# Patient Record
Sex: FEMALE | Race: WHITE | Employment: OTHER | URBAN - METROPOLITAN AREA
[De-identification: names, ages, dates, MRNs, and addresses within clinical notes are randomized per-mention and may not be internally consistent; named-entity substitution may affect disease eponyms.]

---

## 2017-10-14 ENCOUNTER — APPOINTMENT (OUTPATIENT)
Dept: CT IMAGING | Age: 62
DRG: 069 | End: 2017-10-14
Attending: EMERGENCY MEDICINE
Payer: SELF-PAY

## 2017-10-14 ENCOUNTER — APPOINTMENT (OUTPATIENT)
Dept: MRI IMAGING | Age: 62
DRG: 069 | End: 2017-10-14
Attending: EMERGENCY MEDICINE
Payer: SELF-PAY

## 2017-10-14 ENCOUNTER — APPOINTMENT (OUTPATIENT)
Dept: GENERAL RADIOLOGY | Age: 62
DRG: 069 | End: 2017-10-14
Attending: EMERGENCY MEDICINE
Payer: SELF-PAY

## 2017-10-14 ENCOUNTER — HOSPITAL ENCOUNTER (INPATIENT)
Age: 62
LOS: 4 days | Discharge: HOME | DRG: 069 | End: 2017-10-18
Attending: EMERGENCY MEDICINE | Admitting: HOSPITALIST
Payer: SELF-PAY

## 2017-10-14 DIAGNOSIS — R42 VERTIGO: Primary | ICD-10-CM

## 2017-10-14 DIAGNOSIS — Z71.6 TOBACCO ABUSE COUNSELING: ICD-10-CM

## 2017-10-14 DIAGNOSIS — R47.81 SLURRED SPEECH: ICD-10-CM

## 2017-10-14 DIAGNOSIS — D75.1 POLYCYTHEMIA: ICD-10-CM

## 2017-10-14 DIAGNOSIS — G45.9 TRANSIENT CEREBRAL ISCHEMIA, UNSPECIFIED TYPE: ICD-10-CM

## 2017-10-14 LAB
ALBUMIN SERPL-MCNC: 3.8 G/DL (ref 3.5–5)
ALBUMIN/GLOB SERPL: 1 {RATIO} (ref 1.1–2.2)
ALP SERPL-CCNC: 112 U/L (ref 45–117)
ALT SERPL-CCNC: 19 U/L (ref 12–78)
ANION GAP SERPL CALC-SCNC: 11 MMOL/L (ref 5–15)
AST SERPL-CCNC: 17 U/L (ref 15–37)
BASOPHILS # BLD: 0 K/UL (ref 0–0.1)
BASOPHILS NFR BLD: 0 % (ref 0–1)
BILIRUB SERPL-MCNC: 0.9 MG/DL (ref 0.2–1)
BUN SERPL-MCNC: 16 MG/DL (ref 6–20)
BUN/CREAT SERPL: 18 (ref 12–20)
CALCIUM SERPL-MCNC: 9.7 MG/DL (ref 8.5–10.1)
CHLORIDE SERPL-SCNC: 106 MMOL/L (ref 97–108)
CO2 SERPL-SCNC: 22 MMOL/L (ref 21–32)
CREAT SERPL-MCNC: 0.9 MG/DL (ref 0.55–1.02)
EOSINOPHIL # BLD: 0.1 K/UL (ref 0–0.4)
EOSINOPHIL NFR BLD: 1 % (ref 0–7)
ERYTHROCYTE [DISTWIDTH] IN BLOOD BY AUTOMATED COUNT: 12.9 % (ref 11.5–14.5)
ETHANOL SERPL-MCNC: <10 MG/DL
GLOBULIN SER CALC-MCNC: 4 G/DL (ref 2–4)
GLUCOSE BLD STRIP.AUTO-MCNC: 141 MG/DL (ref 65–100)
GLUCOSE SERPL-MCNC: 110 MG/DL (ref 65–100)
HCT VFR BLD AUTO: 54.8 % (ref 35–47)
HGB BLD-MCNC: 19.5 G/DL (ref 11.5–16)
INR BLD: 1.3 (ref 0.9–1.2)
LYMPHOCYTES # BLD: 1.5 K/UL (ref 0.8–3.5)
LYMPHOCYTES NFR BLD: 20 % (ref 12–49)
MCH RBC QN AUTO: 34.5 PG (ref 26–34)
MCHC RBC AUTO-ENTMCNC: 35.6 G/DL (ref 30–36.5)
MCV RBC AUTO: 96.8 FL (ref 80–99)
MONOCYTES # BLD: 0.6 K/UL (ref 0–1)
MONOCYTES NFR BLD: 8 % (ref 5–13)
NEUTS SEG # BLD: 5.1 K/UL (ref 1.8–8)
NEUTS SEG NFR BLD: 71 % (ref 32–75)
PLATELET # BLD AUTO: 226 K/UL (ref 150–400)
POTASSIUM SERPL-SCNC: 3.6 MMOL/L (ref 3.5–5.1)
PROT SERPL-MCNC: 7.8 G/DL (ref 6.4–8.2)
RBC # BLD AUTO: 5.66 M/UL (ref 3.8–5.2)
SERVICE CMNT-IMP: ABNORMAL
SODIUM SERPL-SCNC: 139 MMOL/L (ref 136–145)
TROPONIN I SERPL-MCNC: <0.04 NG/ML
WBC # BLD AUTO: 7.2 K/UL (ref 3.6–11)

## 2017-10-14 PROCEDURE — 80053 COMPREHEN METABOLIC PANEL: CPT | Performed by: EMERGENCY MEDICINE

## 2017-10-14 PROCEDURE — 93005 ELECTROCARDIOGRAM TRACING: CPT

## 2017-10-14 PROCEDURE — 70547 MR ANGIOGRAPHY NECK W/O DYE: CPT

## 2017-10-14 PROCEDURE — 96374 THER/PROPH/DIAG INJ IV PUSH: CPT

## 2017-10-14 PROCEDURE — 70544 MR ANGIOGRAPHY HEAD W/O DYE: CPT

## 2017-10-14 PROCEDURE — 96376 TX/PRO/DX INJ SAME DRUG ADON: CPT

## 2017-10-14 PROCEDURE — 74011250636 HC RX REV CODE- 250/636: Performed by: INTERNAL MEDICINE

## 2017-10-14 PROCEDURE — 74011000250 HC RX REV CODE- 250: Performed by: INTERNAL MEDICINE

## 2017-10-14 PROCEDURE — 85025 COMPLETE CBC W/AUTO DIFF WBC: CPT | Performed by: EMERGENCY MEDICINE

## 2017-10-14 PROCEDURE — 74011250636 HC RX REV CODE- 250/636: Performed by: EMERGENCY MEDICINE

## 2017-10-14 PROCEDURE — 96361 HYDRATE IV INFUSION ADD-ON: CPT

## 2017-10-14 PROCEDURE — 80307 DRUG TEST PRSMV CHEM ANLYZR: CPT | Performed by: EMERGENCY MEDICINE

## 2017-10-14 PROCEDURE — 82962 GLUCOSE BLOOD TEST: CPT

## 2017-10-14 PROCEDURE — 65660000000 HC RM CCU STEPDOWN

## 2017-10-14 PROCEDURE — 99285 EMERGENCY DEPT VISIT HI MDM: CPT

## 2017-10-14 PROCEDURE — 77030029684 HC NEB SM VOL KT MONA -A

## 2017-10-14 PROCEDURE — 84484 ASSAY OF TROPONIN QUANT: CPT | Performed by: EMERGENCY MEDICINE

## 2017-10-14 PROCEDURE — 36415 COLL VENOUS BLD VENIPUNCTURE: CPT | Performed by: EMERGENCY MEDICINE

## 2017-10-14 PROCEDURE — 74011250637 HC RX REV CODE- 250/637: Performed by: INTERNAL MEDICINE

## 2017-10-14 PROCEDURE — 70551 MRI BRAIN STEM W/O DYE: CPT

## 2017-10-14 PROCEDURE — 71010 XR CHEST PORT: CPT

## 2017-10-14 PROCEDURE — 94640 AIRWAY INHALATION TREATMENT: CPT

## 2017-10-14 PROCEDURE — 70450 CT HEAD/BRAIN W/O DYE: CPT

## 2017-10-14 PROCEDURE — 85610 PROTHROMBIN TIME: CPT

## 2017-10-14 RX ORDER — SODIUM CHLORIDE 9 MG/ML
50 INJECTION, SOLUTION INTRAVENOUS ONCE
Status: DISPENSED | OUTPATIENT
Start: 2017-10-14 | End: 2017-10-15

## 2017-10-14 RX ORDER — SODIUM CHLORIDE 0.9 % (FLUSH) 0.9 %
5 SYRINGE (ML) INJECTION AS NEEDED
Status: DISCONTINUED | OUTPATIENT
Start: 2017-10-14 | End: 2017-10-18 | Stop reason: HOSPADM

## 2017-10-14 RX ORDER — ONDANSETRON 2 MG/ML
4 INJECTION INTRAMUSCULAR; INTRAVENOUS
Status: COMPLETED | OUTPATIENT
Start: 2017-10-14 | End: 2017-10-14

## 2017-10-14 RX ORDER — CALCIUM CARBONATE 200(500)MG
200 TABLET,CHEWABLE ORAL
Status: DISCONTINUED | OUTPATIENT
Start: 2017-10-14 | End: 2017-10-18 | Stop reason: HOSPADM

## 2017-10-14 RX ORDER — CLINDAMYCIN HYDROCHLORIDE 150 MG/1
300 CAPSULE ORAL 3 TIMES DAILY
Status: COMPLETED | OUTPATIENT
Start: 2017-10-14 | End: 2017-10-16

## 2017-10-14 RX ORDER — LABETALOL HYDROCHLORIDE 5 MG/ML
10 INJECTION, SOLUTION INTRAVENOUS
Status: DISCONTINUED | OUTPATIENT
Start: 2017-10-14 | End: 2017-10-14

## 2017-10-14 RX ORDER — SODIUM CHLORIDE 0.9 % (FLUSH) 0.9 %
5-10 SYRINGE (ML) INJECTION EVERY 8 HOURS
Status: DISCONTINUED | OUTPATIENT
Start: 2017-10-14 | End: 2017-10-18 | Stop reason: HOSPADM

## 2017-10-14 RX ORDER — SODIUM CHLORIDE 9 MG/ML
100 INJECTION, SOLUTION INTRAVENOUS CONTINUOUS
Status: DISCONTINUED | OUTPATIENT
Start: 2017-10-14 | End: 2017-10-18 | Stop reason: HOSPADM

## 2017-10-14 RX ORDER — CLINDAMYCIN HYDROCHLORIDE 300 MG/1
300 CAPSULE ORAL 3 TIMES DAILY
COMMUNITY
End: 2017-10-18

## 2017-10-14 RX ORDER — ERGOCALCIFEROL 1.25 MG/1
50000 CAPSULE ORAL
COMMUNITY
End: 2021-05-26

## 2017-10-14 RX ORDER — IPRATROPIUM BROMIDE AND ALBUTEROL SULFATE 2.5; .5 MG/3ML; MG/3ML
3 SOLUTION RESPIRATORY (INHALATION)
Status: DISCONTINUED | OUTPATIENT
Start: 2017-10-14 | End: 2017-10-18 | Stop reason: HOSPADM

## 2017-10-14 RX ORDER — NAPROXEN 500 MG/1
500 TABLET ORAL 2 TIMES DAILY WITH MEALS
COMMUNITY
End: 2017-10-18

## 2017-10-14 RX ORDER — SODIUM CHLORIDE 0.9 % (FLUSH) 0.9 %
5-10 SYRINGE (ML) INJECTION AS NEEDED
Status: DISCONTINUED | OUTPATIENT
Start: 2017-10-14 | End: 2017-10-18 | Stop reason: HOSPADM

## 2017-10-14 RX ORDER — ENOXAPARIN SODIUM 100 MG/ML
40 INJECTION SUBCUTANEOUS EVERY 24 HOURS
Status: DISCONTINUED | OUTPATIENT
Start: 2017-10-14 | End: 2017-10-18 | Stop reason: HOSPADM

## 2017-10-14 RX ORDER — SODIUM CHLORIDE 0.9 % (FLUSH) 0.9 %
5 SYRINGE (ML) INJECTION EVERY 8 HOURS
Status: DISCONTINUED | OUTPATIENT
Start: 2017-10-14 | End: 2017-10-14

## 2017-10-14 RX ORDER — ALPRAZOLAM 0.5 MG/1
0.25 TABLET ORAL
Status: DISCONTINUED | OUTPATIENT
Start: 2017-10-14 | End: 2017-10-18 | Stop reason: HOSPADM

## 2017-10-14 RX ORDER — GUAIFENESIN 100 MG/5ML
81 LIQUID (ML) ORAL DAILY
Status: DISCONTINUED | OUTPATIENT
Start: 2017-10-14 | End: 2017-10-18 | Stop reason: HOSPADM

## 2017-10-14 RX ADMIN — SODIUM CHLORIDE 100 ML/HR: 900 INJECTION, SOLUTION INTRAVENOUS at 20:24

## 2017-10-14 RX ADMIN — CLINDAMYCIN HYDROCHLORIDE 300 MG: 150 CAPSULE ORAL at 19:31

## 2017-10-14 RX ADMIN — ASPIRIN 81 MG 81 MG: 81 TABLET ORAL at 19:31

## 2017-10-14 RX ADMIN — ENOXAPARIN SODIUM 40 MG: 40 INJECTION SUBCUTANEOUS at 19:30

## 2017-10-14 RX ADMIN — Medication 10 ML: at 22:00

## 2017-10-14 RX ADMIN — CALCIUM CARBONATE (ANTACID) CHEW TAB 500 MG 200 MG: 500 CHEW TAB at 19:57

## 2017-10-14 RX ADMIN — ALPRAZOLAM 0.25 MG: 0.5 TABLET ORAL at 19:56

## 2017-10-14 RX ADMIN — Medication 10 ML: at 19:27

## 2017-10-14 RX ADMIN — SODIUM CHLORIDE 1000 ML: 900 INJECTION, SOLUTION INTRAVENOUS at 14:13

## 2017-10-14 RX ADMIN — CLINDAMYCIN HYDROCHLORIDE 300 MG: 150 CAPSULE ORAL at 22:27

## 2017-10-14 RX ADMIN — ONDANSETRON 4 MG: 2 INJECTION INTRAMUSCULAR; INTRAVENOUS at 14:13

## 2017-10-14 RX ADMIN — FAMOTIDINE 20 MG: 10 INJECTION, SOLUTION INTRAVENOUS at 19:25

## 2017-10-14 RX ADMIN — IPRATROPIUM BROMIDE AND ALBUTEROL SULFATE 3 ML: .5; 3 SOLUTION RESPIRATORY (INHALATION) at 22:49

## 2017-10-14 RX ADMIN — ONDANSETRON 4 MG: 2 INJECTION INTRAMUSCULAR; INTRAVENOUS at 16:11

## 2017-10-14 NOTE — IP AVS SNAPSHOT
[address] [address] 
[phone] Patient: Pratibha Richardson MRN: [id number] AQS:7/03/7203 Current Discharge Medication List  
  
START taking these medications Dose & Instructions Dispensing Information Comments Morning Noon Evening Bedtime  
 aspirin 81 mg chewable tablet Start taking on:  10/19/2017 Your last dose was: Your next dose is:    
   
   
 Dose:  81 mg Take 1 Tab by mouth daily. Quantity:  30 Tab Refills:  0  
     
   
   
   
  
 atorvastatin 20 mg tablet Commonly known as:  LIPITOR Your last dose was: Your next dose is:    
   
   
 Dose:  20 mg Take 1 Tab by mouth nightly. Quantity:  30 Tab Refills:  0  
     
   
   
   
  
 calcium carbonate 200 mg calcium (500 mg) Chew Commonly known as:  TUMS Your last dose was: Your next dose is:    
   
   
 Dose:  200 mg Take 1 Tab by mouth four (4) times daily as needed. Quantity:  30 Tab Refills:  0 CONTINUE these medications which have NOT CHANGED Dose & Instructions Dispensing Information Comments Morning Noon Evening Bedtime COMBIVENT RESPIMAT  mcg/actuation inhaler Generic drug:  ipratropium-albuterol Your last dose was: Your next dose is:    
   
   
 Dose:  1 Puff Take 1 Puff by inhalation every six (6) hours. Refills:  0  
     
   
   
   
  
 VITAMIN D2 50,000 unit capsule Generic drug:  ergocalciferol Your last dose was: Your next dose is:    
   
   
 Dose:  76982 Units Take 50,000 Units by mouth. Refills:  0 STOP taking these medications   
 clindamycin 300 mg capsule Commonly known as:  CLEOCIN  
   
  
 naproxen 500 mg tablet Commonly known as:  NAPROSYN Where to Get Your Medications Information on where to get these meds will be given to you by the nurse or doctor. ! Ask your nurse or doctor about these medications  
  aspirin 81 mg chewable tablet  
 atorvastatin 20 mg tablet  
 calcium carbonate 200 mg calcium (500 mg) Chew

## 2017-10-14 NOTE — ED NOTES
Patient attempted to use bedpan in MRI, unable to void. Speech improved to normal, no slurring.   Refusing IV contrast, continuing test without

## 2017-10-14 NOTE — IP AVS SNAPSHOT
[address] 
[phone] Patient: Mika Iyer MRN: [id number] BGS:7/64/2367 You are allergic to the following Allergen Reactions Darvocet A500 (Propoxyphene N-Acetaminophen) Shortness of Breath Iodine Hives Sulfa (Sulfonamide Antibiotics) Other (comments) Recent Documentation Height Weight BMI OB Status Smoking Status 1.549 m 58.1 kg 24.2 kg/m2 Hysterectomy Current Every Day Smoker Unresulted Labs Order Current Status SAMPLE TO BLOOD BANK In process [facility]. LAB TEST Preliminary result Emergency Contacts Name Discharge Info Relation Home Work Mobile Hima Au  Child [2] [phone] About your hospitalization You were admitted on:  October 14, 2017 You last received care in the:  [facility] 6S NEURO-SCI TELE You were discharged on:  October 18, 2017 Unit phone number:  [phone] Why you were hospitalized Your primary diagnosis was:  Tia (Transient Ischemic Attack) Providers Seen During Your Hospitalizations Provider Role Specialty Primary office phone Maria Esther Mcdonnell MD Attending Provider Emergency Medicine [phone] Catherine Ware MD Attending Provider Internal Medicine [phone] Raciel Cornejo MD Attending Provider Internal Medicine [phone] Nakul Calvo MD Attending Provider Internal Medicine [phone] Your Primary Care Physician (PCP) Primary Care Physician Office Phone Office Fax Deepak Vasiliy [phone] Follow-up Information Follow up With Details Comments Contact Info Karla Cordoba MD Go on 10/30/2017 Appt 10/30/17 at 1pm. Please arrive about 15 minutes prior to scheduled appointment. [address] [place] [address] 
[phone]  Yaz Velazquez MD Schedule an appointment as soon as possible for a visit in 1 week Hospital follow up [address] [facility] [address] 
[phone] Your Appointments Monday October 30, 2017  1:00 PM EDT New Patient with MD Dilan [facility] at [address] [facility] [address] [place] [address]) 301 [facility], [address] [facility]  
[phone] Current Discharge Medication List  
  
START taking these medications Dose & Instructions Dispensing Information Comments Morning Noon Evening Bedtime  
 aspirin 81 mg chewable tablet Start taking on:  10/19/2017 Your last dose was: Your next dose is:    
   
   
 Dose:  81 mg Take 1 Tab by mouth daily. Quantity:  30 Tab Refills:  0  
     
   
   
   
  
 atorvastatin 20 mg tablet Commonly known as:  LIPITOR Your last dose was: Your next dose is:    
   
   
 Dose:  20 mg Take 1 Tab by mouth nightly. Quantity:  30 Tab Refills:  0  
     
   
   
   
  
 calcium carbonate 200 mg calcium (500 mg) Chew Commonly known as:  TUMS Your last dose was: Your next dose is:    
   
   
 Dose:  200 mg Take 1 Tab by mouth four (4) times daily as needed. Quantity:  30 Tab Refills:  0 CONTINUE these medications which have NOT CHANGED Dose & Instructions Dispensing Information Comments Morning Noon Evening Bedtime COMBIVENT RESPIMAT  mcg/actuation inhaler Generic drug:  ipratropium-albuterol Your last dose was: Your next dose is:    
   
   
 Dose:  1 Puff Take 1 Puff by inhalation every six (6) hours. Refills:  0  
     
   
   
   
  
 VITAMIN D2 50,000 unit capsule Generic drug:  ergocalciferol Your last dose was: Your next dose is:    
   
   
 Dose:  06793 Units Take 50,000 Units by mouth. Refills:  0 STOP taking these medications clindamycin 300 mg capsule Commonly known as:  CLEOCIN  
   
  
 naproxen 500 mg tablet Commonly known as:  NAPROSYN Where to Get Your Medications Information on where to get these meds will be given to you by the nurse or doctor. ! Ask your nurse or doctor about these medications  
  aspirin 81 mg chewable tablet  
 atorvastatin 20 mg tablet  
 calcium carbonate 200 mg calcium (500 mg) Chew Discharge Instructions Discharge Instructions PATIENT ID: Aramis Crabtree MRN: [id number] YOB: 1955 DATE OF ADMISSION: 10/14/2017  1:12 PM   
DATE OF DISCHARGE: 10/18/2017 PRIMARY CARE PROVIDER: Christy Bustos MD  
 
ATTENDING PHYSICIAN: Frankey Console, MD 
DISCHARGING PROVIDER: Frankey Console, MD   
To contact this individual call [phone] and ask the  to page. If unavailable ask to be transferred the Adult Hospitalist Department. DISCHARGE DIAGNOSES: TIA ? Polycythemia CONSULTATIONS: IP CONSULT TO HOSPITALIST 
IP CONSULT TO NEUROLOGY 
IP CONSULT TO HEMATOLOGY 
IP CONSULT TO PSYCHIATRY PROCEDURES/SURGERIES: * No surgery found * PENDING TEST RESULTS:  
At the time of discharge the following test results are still pending: None FOLLOW UP APPOINTMENTS:  
Follow-up Information Follow up With Details Comments Contact Info Doc MD Ernestina Go on 10/30/2017 Appt 10/30/17 at 1pm. Please arrive about 15 minutes prior to scheduled appointment. [address] [place] [address] [address] [place] [address] 
[phone] Christy Bustos MD   [address] [place] [address] [address] [place] [address] 
[phone] ADDITIONAL CARE RECOMMENDATIONS: Strongly advised to quit smoking DIET: Cardiac Diet ACTIVITY: Activity as tolerated DISCHARGE MEDICATIONS: 
 See Medication Reconciliation Form · It is important that you take the medication exactly as they are prescribed. · Keep your medication in the bottles provided by the pharmacist and keep a list of the medication names, dosages, and times to be taken in your wallet. · Do not take other medications without consulting your doctor. NOTIFY YOUR PHYSICIAN FOR ANY OF THE FOLLOWING:  
Fever over 101 degrees for 24 hours. Chest pain, shortness of breath, fever, chills, nausea, vomiting, diarrhea, change in mentation, falling, weakness, bleeding. Severe pain or pain not relieved by medications. Or, any other signs or symptoms that you may have questions about. DISPOSITION: 
X  Home With: 
 OT  PT  [place]  RN  
  
 SNF/Inpatient Rehab/LTAC Independent/assisted living Hospice Other: CDMP Checked: Yes X PROBLEM LIST Updated: Yes X Signed:  
Lucita Cummings MD 
10/18/2017 
1:59 PM 
 
Discharge Orders None Introducing \[facility]\"" & [facility] SERVICES! Jeff Martin introduces [employer] patient portal. Now you can access parts of your medical record, email your doctor's office, and request medication refills online. 1. In your internet browser, go to [email or web address] 2. Click on the First Time User? Click Here link in the Sign In box. You will see the New Member Sign Up page. 3. Enter your [employer] Access Code exactly as it appears below. You will not need to use this code after youve completed the sign-up process. If you do not sign up before the expiration date, you must request a new code. · [employer] Access Code: [email or web address] Expires: 1/14/2018  2:42 PM 
 
4. Enter the last four digits of your Social Security Number (xxxx) and Date of Birth (mm/dd/yyyy) as indicated and click Submit. You will be taken to the next sign-up page. 5. Create a [employer] ID. This will be your [employer] login ID and cannot be changed, so think of one that is secure and easy to remember. 6. Create a [employer] password. You can change your password at any time. 7. Enter your Password Reset Question and Answer. This can be used at a later time if you forget your password. 8. Enter your e-mail address. You will receive e-mail notification when new information is available in [address] 9. Click Sign Up. You can now view and download portions of your medical record. 10. Click the Download Summary menu link to download a portable copy of your medical information. If you have questions, please visit the Frequently Asked Questions section of the [employer] website. Remember, [employer] is NOT to be used for urgent needs. For medical emergencies, dial 911. Now available from your iPhone and Android! General Information Please provide this summary of care documentation to your next provider. Patient Signature:  ____________________________________________________________ Date:  ____________________________________________________________  
  
Sheyla Huber Provider Signature:  ____________________________________________________________ Date:  ____________________________________________________________

## 2017-10-14 NOTE — PROGRESS NOTES
Admission Medication Reconciliation:    Information obtained from: Sons and daughter-in-law: Abiodun Wilder and Celso Booker Hoff Stout    Significant PMH/Disease States:   Past Medical History:   Diagnosis Date    Cancer [facility])     cervical cancer    Cancer ([facility] [address]     skin cancer    Ill-defined condition     lyme disease       Chief Complaint for this Admission:  Dizziness    Allergies:  Darvocet a500 [propoxyphene n-acetaminophen]; Iodine; and Sulfa (sulfonamide antibiotics)    Prior to Admission Medications:   Prior to Admission Medications   Prescriptions Last Dose Informant Patient Reported? Taking? clindamycin (CLEOCIN) 300 mg capsule 10/14/2017 at am  Yes Yes   Sig: Take 300 mg by mouth three (3) times daily. Facility-Administered Medications: None         Comments/Recommendations: Unable to interview patient (PARTH @ MRI), but her family was available. All information is from children only, will follow up when patient returns. The following items were brought to the attention of Dr. Jane Centeno:  1. Clindamycin is thought to be treating rectal or anal abscesses or other SSTI, prescription filled 10/2/2017 and was intended as 10 day course, took a dose today (son described that patient had lower portion of colon removed and had been having issues for some time)  2. Patient is heavy smoker and drinker, recommended CIWA and nicotine patch for patient  3. Children describe blood disorder (\"too much blood\")    Thank you for allowing me to participate in the care of your patient.     Reid Landaverde PharmD, RN #[id number]

## 2017-10-14 NOTE — ED NOTES
Updated Dr Sujatha Nieves via cell phone regarding negative reading, updated Dr Dorothy Hebert via phone. Patient remains responsive, no change to assessment.

## 2017-10-14 NOTE — PROGRESS NOTES
Intravenous Proton Pump Inhibitor (Pantoprazole) Shortage Update    The IV pantoprazole for Jannet Ware has been converted to IV famotidine in accordance with the [place]-Approved IV PPI Shortage Plan. IV pantoprazole will be reserved only for patients with clinically or endoscopically documented upper GI bleeding, confirmed diagnosis of H. pylori or acute erosive esophagitis in patients who are NPO. Please call the main inpatient pharmacy at [phone] with any questions.       Jerson Landeros, PharmD

## 2017-10-14 NOTE — ED TRIAGE NOTES
Pt presents from home, at approximately 10:30am pt became dizzy, had uncontrollable shaking all over, and numb hands. Pt states that at 1230 she began to have abnormal speech.  Blood sugar 181 in squad

## 2017-10-14 NOTE — ED PROVIDER NOTES
HPI Comments: 58yo F with pmh sig for lyme disease, cervical ca who p/w shakiness and slurred speech. Symptoms started around 10:30 AM today. Pt got a shower to help with symptoms but shakiness was worse afterwards. Pt also noted slurred speech around that time. Pt states never had symptoms like this before. Has h/o R sided facial weakness as a result of lyme disease but no history of speech difficulty. Denies any extremity weakness/numbness. Denies chest pain, headache, n/v, c/d. Pt adds that she has a disorder with overproduction of blood and has been placed on bedrest by her PCP. Patient is a 60 y.o. female presenting with dizziness. The history is provided by the patient and a relative. Dizziness   Primary symptoms include speech difficulty. Pertinent negatives include no chest pain. Past Medical History:   Diagnosis Date    Cancer [facility])     cervical cancer    Cancer ([facility] [address]     skin cancer    Ill-defined condition     lyme disease       Past Surgical History:   Procedure Laterality Date    HX GI      isaias colectomy    HX GYN      partial hysterectomy    HX GYN      c section    HX ORTHOPAEDIC      RIGHT shoulder surgery    HX ORTHOPAEDIC      RIGHT foot surgery    NEUROLOGICAL PROCEDURE UNLISTED      lumber surgery         History reviewed. No pertinent family history. Social History     Social History    Marital status: N/A     Spouse name: N/A    Number of children: N/A    Years of education: N/A     Occupational History    Not on file. Social History Main Topics    Smoking status: Current Every Day Smoker     Packs/day: 1.00    Smokeless tobacco: Never Used    Alcohol use 6.0 oz/week     10 Shots of liquor per week    Drug use: Not on file    Sexual activity: Not on file     Other Topics Concern    Not on file     Social History Narrative    No narrative on file         ALLERGIES: Darvocet a500 [propoxyphene n-acetaminophen];  Iodine; and Sulfa (sulfonamide antibiotics)    Review of Systems   Constitutional: Negative for fever. HENT: Negative for facial swelling. Eyes: Negative for visual disturbance. Respiratory: Negative for chest tightness. Cardiovascular: Negative for chest pain. Gastrointestinal: Negative for abdominal pain. Genitourinary: Negative for dysuria. Musculoskeletal: Negative for arthralgias. Skin: Negative for rash. Neurological: Positive for dizziness and speech difficulty. Hematological: Negative for adenopathy. Psychiatric/Behavioral: Negative for suicidal ideas. Vitals:    10/14/17 1336   BP: (!) 145/98   Pulse: 87   Resp: 23   Temp: 97.9 °F (36.6 °C)   SpO2: 96%   Weight: 59.3 kg (130 lb 11.2 oz)   Height: 5' 1\" (1.549 m)            Physical Exam   Constitutional: She is oriented to person, place, and time. She appears well-developed and well-nourished. No distress. HENT:   Head: Normocephalic and atraumatic. Mouth/Throat: Oropharynx is clear and moist.   Eyes: Pupils are equal, round, and reactive to light. No scleral icterus. Neck: Normal range of motion. Neck supple. No thyromegaly present. Cardiovascular: Normal rate, regular rhythm, normal heart sounds and intact distal pulses. No murmur heard. Pulmonary/Chest: Effort normal and breath sounds normal. No respiratory distress. Abdominal: Soft. Bowel sounds are normal. She exhibits no distension. There is no tenderness. Musculoskeletal: Normal range of motion. She exhibits no edema. Neurological: She is alert and oriented to person, place, and time. Slurred speech, R sided facial droop. No other focal deficits. Skin: Skin is warm and dry. No rash noted. She is not diaphoretic. Nursing note and vitals reviewed. MDM  Number of Diagnoses or Management Options  Diagnosis management comments: A:  Slurred speech and dizziness. Pt with boughts of vertigo while in stretcher. Some concern for posterior circulation.   Dr. Mukund Messer from [facility] eval'd patient on monitor and spoke with family. Pt with low NIH score due to slurred speech but also having significant vertigo symptoms which would be potentially debilitating and could result from ischemic stroke. Family undecided about tpa. Dr. Peyman Sanchez rec'd stat CTA as patient still within 4.5 hour window for intervention. After wheeling patient to CT, she informs us that she is allergic to iodine. Pt brought back to ED room. Neurologist now rec'd stat MRI with perfusion images only to see if evidence of ischemia. I spoke with MRI and radiologist who informed me that this was not part of their protocol. Pt enroute to MRI at this time for stat study. Pt could still be candidate for intravascular intervention if clot identified. Family informed unlikely that we will have results within 4.5 hour window. ED Course       Procedures    ED EKG interpretation:  Rhythm: normal sinus rhythm. Rate (approx.): 66.  Axis: normal.  ST segment:  No concerning ST elevations or depressions. This EKG was interpreted by Red Will MD,ED Provider. Head CT: IMPRESSION: No acute process. Hgb=19.5  CMP unremarkable. INR:  1.3    3:07 PM  MRI perfusion images neg. Nurse Xiao Brandt discussed with teleneurology (Dr. Peyman Sanchez) and rec'd no tpa. Will continue with remainder of MRI then return to ED. I discussed case with Dr. Sherryle Poncho who will eval for admisiion. 3:08 PM  I have spent **42* minutes of critical care time involved in lab review, consultations with specialist, family decision-making, and documentation. During this entire length of time I was immediately available to the patient and/or family. 3:35 PM  Updated by pharmacist who was speaking with patient's children. She was informed that patient is heavy alcohol drinker on daily basis. They also think pt has been taking clindamycin for abscess. I ordered a CIWA score calculation.
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
## 2017-10-15 ENCOUNTER — APPOINTMENT (OUTPATIENT)
Dept: CT IMAGING | Age: 62
DRG: 069 | End: 2017-10-15
Attending: HOSPITALIST
Payer: SELF-PAY

## 2017-10-15 LAB
ANION GAP SERPL CALC-SCNC: 7 MMOL/L (ref 5–15)
ANION GAP SERPL CALC-SCNC: 9 MMOL/L (ref 5–15)
ATRIAL RATE: 81 BPM
BASOPHILS # BLD: 0 K/UL (ref 0–0.1)
BASOPHILS # BLD: 0 K/UL (ref 0–0.1)
BASOPHILS NFR BLD: 0 % (ref 0–1)
BASOPHILS NFR BLD: 0 % (ref 0–1)
BUN SERPL-MCNC: 12 MG/DL (ref 6–20)
BUN SERPL-MCNC: 12 MG/DL (ref 6–20)
BUN/CREAT SERPL: 16 (ref 12–20)
BUN/CREAT SERPL: 18 (ref 12–20)
CALCIUM SERPL-MCNC: 7 MG/DL (ref 8.5–10.1)
CALCIUM SERPL-MCNC: 8.7 MG/DL (ref 8.5–10.1)
CALCULATED P AXIS, ECG09: 79 DEGREES
CALCULATED R AXIS, ECG10: 24 DEGREES
CALCULATED T AXIS, ECG11: 87 DEGREES
CHLORIDE SERPL-SCNC: 110 MMOL/L (ref 97–108)
CHLORIDE SERPL-SCNC: 117 MMOL/L (ref 97–108)
CHOLEST SERPL-MCNC: 193 MG/DL
CO2 SERPL-SCNC: 21 MMOL/L (ref 21–32)
CO2 SERPL-SCNC: 25 MMOL/L (ref 21–32)
CREAT SERPL-MCNC: 0.67 MG/DL (ref 0.55–1.02)
CREAT SERPL-MCNC: 0.75 MG/DL (ref 0.55–1.02)
DIAGNOSIS, 93000: NORMAL
DIFFERENTIAL METHOD BLD: ABNORMAL
EOSINOPHIL # BLD: 0.1 K/UL (ref 0–0.4)
EOSINOPHIL # BLD: 0.1 K/UL (ref 0–0.4)
EOSINOPHIL NFR BLD: 2 % (ref 0–7)
EOSINOPHIL NFR BLD: 2 % (ref 0–7)
ERYTHROCYTE [DISTWIDTH] IN BLOOD BY AUTOMATED COUNT: 13 % (ref 11.5–14.5)
ERYTHROCYTE [DISTWIDTH] IN BLOOD BY AUTOMATED COUNT: 13.2 % (ref 11.5–14.5)
EST. AVERAGE GLUCOSE BLD GHB EST-MCNC: 111 MG/DL
GLUCOSE BLD STRIP.AUTO-MCNC: 116 MG/DL (ref 65–100)
GLUCOSE SERPL-MCNC: 119 MG/DL (ref 65–100)
GLUCOSE SERPL-MCNC: 81 MG/DL (ref 65–100)
HBA1C MFR BLD: 5.5 % (ref 4.2–6.3)
HCT VFR BLD AUTO: 47.6 % (ref 35–47)
HCT VFR BLD AUTO: 49.2 % (ref 35–47)
HDLC SERPL-MCNC: 51 MG/DL
HDLC SERPL: 3.8 {RATIO} (ref 0–5)
HGB BLD-MCNC: 16.3 G/DL (ref 11.5–16)
HGB BLD-MCNC: 17 G/DL (ref 11.5–16)
INR PPP: 1 (ref 0.9–1.1)
LDH SERPL L TO P-CCNC: 186 U/L (ref 81–246)
LDLC SERPL CALC-MCNC: 104.6 MG/DL (ref 0–100)
LIPID PROFILE,FLP: ABNORMAL
LYMPHOCYTES # BLD: 1.8 K/UL (ref 0.8–3.5)
LYMPHOCYTES # BLD: 2 K/UL (ref 0.8–3.5)
LYMPHOCYTES NFR BLD: 28 % (ref 12–49)
LYMPHOCYTES NFR BLD: 33 % (ref 12–49)
MCH RBC QN AUTO: 33.7 PG (ref 26–34)
MCH RBC QN AUTO: 34.1 PG (ref 26–34)
MCHC RBC AUTO-ENTMCNC: 34.2 G/DL (ref 30–36.5)
MCHC RBC AUTO-ENTMCNC: 34.6 G/DL (ref 30–36.5)
MCV RBC AUTO: 98.3 FL (ref 80–99)
MCV RBC AUTO: 98.8 FL (ref 80–99)
MONOCYTES # BLD: 0.5 K/UL (ref 0–1)
MONOCYTES # BLD: 0.6 K/UL (ref 0–1)
MONOCYTES NFR BLD: 8 % (ref 5–13)
MONOCYTES NFR BLD: 9 % (ref 5–13)
NEUTS SEG # BLD: 3.5 K/UL (ref 1.8–8)
NEUTS SEG # BLD: 4.1 K/UL (ref 1.8–8)
NEUTS SEG NFR BLD: 57 % (ref 32–75)
NEUTS SEG NFR BLD: 61 % (ref 32–75)
P-R INTERVAL, ECG05: 134 MS
PLATELET # BLD AUTO: 212 K/UL (ref 150–400)
PLATELET # BLD AUTO: 218 K/UL (ref 150–400)
POTASSIUM SERPL-SCNC: 3.3 MMOL/L (ref 3.5–5.1)
POTASSIUM SERPL-SCNC: 3.8 MMOL/L (ref 3.5–5.1)
PROTHROMBIN TIME: 10.4 SEC (ref 9–11.1)
Q-T INTERVAL, ECG07: 370 MS
QRS DURATION, ECG06: 60 MS
QTC CALCULATION (BEZET), ECG08: 429 MS
RBC # BLD AUTO: 4.84 M/UL (ref 3.8–5.2)
RBC # BLD AUTO: 4.98 M/UL (ref 3.8–5.2)
RBC MORPH BLD: ABNORMAL
SERVICE CMNT-IMP: ABNORMAL
SODIUM SERPL-SCNC: 142 MMOL/L (ref 136–145)
SODIUM SERPL-SCNC: 147 MMOL/L (ref 136–145)
TRIGL SERPL-MCNC: 187 MG/DL (ref ?–150)
VENTRICULAR RATE, ECG03: 81 BPM
VLDLC SERPL CALC-MCNC: 37.4 MG/DL
WBC # BLD AUTO: 6.1 K/UL (ref 3.6–11)
WBC # BLD AUTO: 6.6 K/UL (ref 3.6–11)

## 2017-10-15 PROCEDURE — 82962 GLUCOSE BLOOD TEST: CPT

## 2017-10-15 PROCEDURE — 82668 ASSAY OF ERYTHROPOIETIN: CPT | Performed by: HOSPITALIST

## 2017-10-15 PROCEDURE — 74011250637 HC RX REV CODE- 250/637: Performed by: INTERNAL MEDICINE

## 2017-10-15 PROCEDURE — 99218 HC RM OBSERVATION: CPT

## 2017-10-15 PROCEDURE — 85025 COMPLETE CBC W/AUTO DIFF WBC: CPT | Performed by: INTERNAL MEDICINE

## 2017-10-15 PROCEDURE — 80048 BASIC METABOLIC PNL TOTAL CA: CPT | Performed by: INTERNAL MEDICINE

## 2017-10-15 PROCEDURE — 83036 HEMOGLOBIN GLYCOSYLATED A1C: CPT

## 2017-10-15 PROCEDURE — 83615 LACTATE (LD) (LDH) ENZYME: CPT | Performed by: HOSPITALIST

## 2017-10-15 PROCEDURE — 74011250636 HC RX REV CODE- 250/636: Performed by: INTERNAL MEDICINE

## 2017-10-15 PROCEDURE — 85610 PROTHROMBIN TIME: CPT | Performed by: HOSPITALIST

## 2017-10-15 PROCEDURE — 70450 CT HEAD/BRAIN W/O DYE: CPT

## 2017-10-15 PROCEDURE — 36415 COLL VENOUS BLD VENIPUNCTURE: CPT | Performed by: INTERNAL MEDICINE

## 2017-10-15 PROCEDURE — 65660000000 HC RM CCU STEPDOWN

## 2017-10-15 PROCEDURE — 80061 LIPID PANEL: CPT

## 2017-10-15 PROCEDURE — 80048 BASIC METABOLIC PNL TOTAL CA: CPT | Performed by: HOSPITALIST

## 2017-10-15 RX ORDER — ONDANSETRON 2 MG/ML
4 INJECTION INTRAMUSCULAR; INTRAVENOUS
Status: DISCONTINUED | OUTPATIENT
Start: 2017-10-15 | End: 2017-10-18 | Stop reason: HOSPADM

## 2017-10-15 RX ADMIN — ASPIRIN 81 MG 81 MG: 81 TABLET ORAL at 08:24

## 2017-10-15 RX ADMIN — Medication 10 ML: at 08:24

## 2017-10-15 RX ADMIN — CLINDAMYCIN HYDROCHLORIDE 300 MG: 150 CAPSULE ORAL at 16:45

## 2017-10-15 RX ADMIN — CLINDAMYCIN HYDROCHLORIDE 300 MG: 150 CAPSULE ORAL at 22:38

## 2017-10-15 RX ADMIN — CLINDAMYCIN HYDROCHLORIDE 300 MG: 150 CAPSULE ORAL at 08:23

## 2017-10-15 RX ADMIN — ALPRAZOLAM 0.25 MG: 0.5 TABLET ORAL at 14:45

## 2017-10-15 RX ADMIN — Medication 10 ML: at 22:39

## 2017-10-15 RX ADMIN — SODIUM CHLORIDE 100 ML/HR: 900 INJECTION, SOLUTION INTRAVENOUS at 13:58

## 2017-10-15 RX ADMIN — ENOXAPARIN SODIUM 40 MG: 40 INJECTION SUBCUTANEOUS at 16:45

## 2017-10-15 RX ADMIN — ONDANSETRON 4 MG: 2 INJECTION INTRAMUSCULAR; INTRAVENOUS at 08:24

## 2017-10-15 RX ADMIN — SODIUM CHLORIDE 100 ML/HR: 900 INJECTION, SOLUTION INTRAVENOUS at 08:35

## 2017-10-15 RX ADMIN — Medication 10 ML: at 13:59

## 2017-10-15 RX ADMIN — ALPRAZOLAM 0.25 MG: 0.5 TABLET ORAL at 08:41

## 2017-10-15 RX ADMIN — ALPRAZOLAM 0.25 MG: 0.5 TABLET ORAL at 03:33

## 2017-10-15 NOTE — PROGRESS NOTES
Responded to Code S. Patient with slurred speech. NIH 4. Dr. Gabriela Cabot and Dr. Angus Lay at bedside. Patient taken to CT with primary nurse and ICU nurse.

## 2017-10-15 NOTE — CONSULTS
Neuro consult completed, dictated note to follow. Possible TIA vs. Hypotensive/dehydration/etoh withdrawal with pt c/o feeling weak all over and shaky prior to getting in shower, then dizzy causing her to sit down, describes sense of head movement, has had vertigo intermittently for last six months, evaluated by PCP. Agree with ASA 81mg daily. Hematology has recommended bleeding 1 unit. Stable for d/c from neuro standpoint.

## 2017-10-15 NOTE — PROGRESS NOTES
Bedside and Verbal shift change report given to Nikole Solis RN (oncoming nurse) by Hesham Peters RN (offgoing nurse). Report included the following information SBAR, Kardex, ED Summary, Intake/Output, MAR, Recent Results and Cardiac Rhythm NSR.

## 2017-10-15 NOTE — PROGRESS NOTES
Hospitalist Progress Note    Dr Rafaela Rojas  Cell: [phone]          Daily Progress Note: 10/15/2017    I have seen and examined the patient. Case discussed with NP, please see her more detailed note   I agree with the overall treatment plan as documented. 70-year-old female   with a past medical history of elevated hemoglobin, the baseline   around 18.5, currently undergoing workup for possible lung and renal   cancer. The patient presents with a 4 hour history of dizziness, vertigo,   slurring of the speech and word-finding difficulty. Possible TIA vs hypotension. Appreciate discussion with Neurology. Polycythemia. Appreciate discussion with Hematology. Per request ordered tests. Phlebotomy requested but spoke to various health care staff including charge nurse, blood bank. Trying to reach [facility] if they can do phlebotomy    Later addition: At around 2:15pm  Code stroke was called as the patient was found confused and speech slurred, minutes after seen by neurology and the nurse taking care of the patient. NIH 2  Vitals stable  Neurology reexamined the patient ane recommended CTA head.   Follow labs, CTA head, Neurology  Will keep the patient here for today          Rafaela Rojas MD      Hospitalist, Internal Medicine            Blackberry number:  [phone]

## 2017-10-15 NOTE — PROGRESS NOTES
1400 RN at patient's bedside for medication administration and rounding. Patient's speech was initially clear, then became slurred. Code S called. Hospitalist Papa Milton) and Neurologist Kristopher Genao) evaluated patient at the bedside. Charge RN, Sheila performed NIH = 4. Labs drawn and Head CT w/o contrast obtained. No acute process identified. Family at bedside with patient. Will continue to monitor the patient. 1736 All symptoms have resolved. Patient back at baseline.

## 2017-10-15 NOTE — CONSULTS
[id number] [facility] [place]  [phone]    Hematology / Oncology Consult    Reason for Visit:   Maury Paul is a 60 y.o. female who is seen in consultation at the request of Dr. Rayshawn Peterson for evaluation of polycythemia. History of Present Illness:   Ms. Ady Edwards is a 59 y/o female was admitted for neurological symptoms concerning for TIA/CVA. She p/w 4 hour h/o dizziness, vertigo, slurred speech and word-finding difficulty as well as some numbness in her fingers. Upon arrival in ED, she was found to have Hgb 19.5 (she reports her usual baseline is 18.5). She was evaluated by thrombolytics, tPA, but patient refused. MRI/MRA showed no abnormalities. Patient reports that all of her symptoms have completely resolved after approx 4 hours. She states that she was notified of an elevated hemoglobin value 1 week ago by her PCP, Dr. Nguyễn Silva. He recommended nephrology and pulmonary referrals due to concern for a kidney or lung tumor. No hematology referral per patient. She also states that blood work 3 months ago was normal. She does smoke cigarettes, 1ppd x 35 yrs. She was notified upon admission that she could not have a nicotine patch due to cardiac/CVA risks. She also reports nausea and diarrhea for the past 2 weeks, but has been staying hydrated with 1 gallon of water daily. She lost 8-lbs in the past 2 weeks. She also reports intermittent night sweats for the past 2 months as well as fatigue for the past 2 yrs. No fevers, chills, melena/hematochezia, CP, HA, blurry vision. No prior h/o neurological deficits, DVT/PE.        Past Medical History:   Diagnosis Date    Cancer [facility])     cervical cancer    Cancer ([facility] [address]     skin cancer    Ill-defined condition     lyme disease      Past Surgical History:   Procedure Laterality Date    HX GI      isaias colectomy    HX GYN      partial hysterectomy    HX GYN      c section    HX ORTHOPAEDIC      RIGHT shoulder surgery    HX ORTHOPAEDIC      RIGHT foot surgery    NEUROLOGICAL PROCEDURE UNLISTED      lumber surgery      Social History   Substance Use Topics    Smoking status: Current Every Day Smoker     Packs/day: 1.00    Smokeless tobacco: Never Used    Alcohol use 6.0 oz/week     10 Shots of liquor per week      History reviewed. No pertinent family history. Current Facility-Administered Medications   Medication Dose Route Frequency    ondansetron (ZOFRAN) injection 4 mg  4 mg IntraVENous Q6H PRN    sodium chloride (NS) flush 5 mL  5 mL IntraVENous PRN    clindamycin (CLEOCIN) capsule 300 mg  300 mg Oral TID    albuterol-ipratropium (DUO-NEB) 2.5 MG-0.5 MG/3 ML  3 mL Nebulization Q6H PRN    sodium chloride (NS) flush 5-10 mL  5-10 mL IntraVENous Q8H    sodium chloride (NS) flush 5-10 mL  5-10 mL IntraVENous PRN    enoxaparin (LOVENOX) injection 40 mg  40 mg SubCUTAneous Q24H    aspirin chewable tablet 81 mg  81 mg Oral DAILY    0.9% sodium chloride infusion  100 mL/hr IntraVENous CONTINUOUS    ALPRAZolam (XANAX) tablet 0.25 mg  0.25 mg Oral QID PRN    calcium carbonate (TUMS) chewable tablet 200 mg [elemental]  200 mg Oral QID PRN    influenza vaccine 2017-18 (3 yrs+)(PF) (FLUZONE QUAD/FLUARIX QUAD) injection 0.5 mL  0.5 mL IntraMUSCular PRIOR TO DISCHARGE      Allergies   Allergen Reactions    Darvocet A500 [Propoxyphene N-Acetaminophen] Shortness of Breath    Iodine Hives    Sulfa (Sulfonamide Antibiotics) Other (comments)        Review of Systems: A complete review of systems was obtained, negative except as described above.     Physical Exam:     Visit Vitals    /67    Pulse 100    Temp 97.8 °F (36.6 °C)    Resp 18    Ht 5' 1\" (1.549 m)    Wt 130 lb 11.2 oz (59.3 kg)    SpO2 94%    BMI 24.7 kg/m2     ECOG PS: 1  General: No distress  Eyes: PERRLA, anicteric sclerae  HENT: Atraumatic with normal appearance of ears and nose; OP clear  Neck: Supple; no thyromegaly   Lymphatic: No cervical, supraclavicular, or inguinal adenopathy  Respiratory: CTAB, normal respiratory effort, coughing  CV: Normal rate, regular rhythm, no murmurs, no peripheral edema  GI: Soft, nontender, nondistended, no masses, no hepatomegaly, no splenomegaly  MS: Normal gait and station. Digits without clubbing or cyanosis. Skin: No rashes, ecchymoses, or petechiae. Normal temperature, turgor, and texture. Psych / Neuro: Alert, oriented, appropriate affect, normal judgment/insight. Moves all 4 extremities. Results:     Lab Results   Component Value Date/Time    WBC 7.2 10/14/2017 01:32 PM    HGB 19.5 10/14/2017 01:32 PM    HCT 54.8 10/14/2017 01:32 PM    PLATELET 583 24/26/1806 01:32 PM    MCV 96.8 10/14/2017 01:32 PM    ABS. NEUTROPHILS 5.1 10/14/2017 01:32 PM     Lab Results   Component Value Date/Time    Sodium 147 10/15/2017 05:55 AM    Potassium 3.3 10/15/2017 05:55 AM    Chloride 117 10/15/2017 05:55 AM    CO2 21 10/15/2017 05:55 AM    Glucose 81 10/15/2017 05:55 AM    BUN 12 10/15/2017 05:55 AM    Creatinine 0.67 10/15/2017 05:55 AM    GFR est AA >60 10/15/2017 05:55 AM    GFR est non-AA >60 10/15/2017 05:55 AM    Calcium 7.0 10/15/2017 05:55 AM    Glucose (POC) 141 10/14/2017 01:14 PM     Lab Results   Component Value Date/Time    Bilirubin, total 0.9 10/14/2017 01:32 PM    ALT (SGPT) 19 10/14/2017 01:32 PM    AST (SGOT) 17 10/14/2017 01:32 PM    Alk. phosphatase 112 10/14/2017 01:32 PM    Protein, total 7.8 10/14/2017 01:32 PM    Albumin 3.8 10/14/2017 01:32 PM    Globulin 4.0 10/14/2017 01:32 PM     Imaging:  Brain MRA / MRI 10/14/17:  No flow limiting stenosis or intracranial aneurysm. 1. No acute or subacute ischemia or other acute intracranial abnormality. 2. Mild microvascular ischemic and other age-related change. Assessment and Recommendations:   61 y/o female with polcythemia and TIA. 1. Polycythemia: Unclear whether this is primary or secondary.  Secondary causes include smoking, severe dehydration, potential EPO-producing tumor. Primary cause would be polcythemia vera. -- Recommend phlebotomy of 1 Unit  -- Please check EPO level, LDH and JAK2 mutation  -- Start and continue ASA 81mg daily even upon discharge  -- I discussed smoking cessation with patient as well. I recommend her to quit smoking immediately and it would be useful to recheck Hgb in 1 month. -- She will need hematology follow up. As patient lives in [place], she will need to find a local hematologist. However, I will call patient with results of EPO, JAK2, LDH. 2. TIA: Several neurological symptoms have resolved without intervention. On ASA 81mg. 3. Tobacco abuse: I counseled on smoking cessation. Patient states she tried a \"smoking patch\" in the past which did not help.     Signed By: Juliet Boss MD     October 15, 2017

## 2017-10-15 NOTE — PROGRESS NOTES
Primary Nurse Jaun Parents and Antonia Montoya RN performed a dual skin assessment on this patient No impairment noted  Marcelo score is 22

## 2017-10-15 NOTE — PROGRESS NOTES
Hospitalist Progress Note  Chaitanya Orozco NP  Answering service: [phone] OR [phone] from in house phone  Cell: [phone]      Date of Service:  10/15/2017  NAME:  Claritza Salazar  :  1955  MRN:  [id number]      Admission Summary:     The patient is a 78-year-old female with a past medical history of elevated hemoglobin, the baseline   around 18.5, currently undergoing workup for possible lung and renal   cancer. The patient presents with a 4 hour history of dizziness, vertigo,   slurring of the speech and word-finding difficulty. As per the patient,   she was in the shower and started noticing some numbness in the tip   of the fingers, which was followed by some episodes of feeling vertigo. The patient was very alert and awake, was aware of the surroundings,   came out of the shower, then the family had started noticing that she   was having some slurring of the speech and sometimes having   difficulty finding words. The patient, all this time, was alert and very   much aware of the surroundings, did not notice any jerking movements   in the arms or legs, did not have any bladder or bowel incontinence. Currently, apparently, the symptoms are completely resolved. The   patient was brought to the ER. CT scan was done, and the decision to   give the patient thrombolytics, tPA, was taken. However, the patient   refused and an MRI scan was done. MRA and MRI scan of the neck   was done, which did not show any obstruction. Interval history / Subjective:       Patient states \" I am just tired of being sick and tired\".       Assessment & Plan:     Possible TIA:   - patient presented with symptoms consistent with TIA   - MRI/MRA negative for acute stroke   - will order Lipid profile and A1C for tomorrow  - all symptoms have now resolved  - neurology consulted  - continue with aspirin   - smoking cessation counseling with patient     Right Buttock Abscess:   - almost healed  - continue clindamycin     Polycythemia (POA) :   - hgb 19   - heme/onc consulted and recommend phlebotomy of 1 unit   - JAZZY, LD and erythropoietin ordered  - needs outpatient follow up   - continue with aspirin     Code status: Full   DVT prophylaxis: Lovenox     Care Plan discussed with: Patient/Family and Nurse and Dr. Pedro Dash   Disposition: Home w/Family     Hospital Problems  Date Reviewed: 10/15/2017          Codes Class Noted POA    * (Principal)TIA (transient ischemic attack) ICD-10-CM: G45.9  ICD-9-CM: 435.9  10/14/2017 Yes                Review of Systems:   A comprehensive review of systems was negative except for: Cardiovascular: positive for fatigue  Gastrointestinal: positive for nausea, diarrhea and abdominal pain       Vital Signs:    Last 24hrs VS reviewed since prior progress note. Most recent are:  Visit Vitals    /69    Pulse 77    Temp 98.4 °F (36.9 °C)    Resp 18    Ht 5' 1\" (1.549 m)    Wt 59.3 kg (130 lb 11.2 oz)    SpO2 94%    BMI 24.7 kg/m2         Intake/Output Summary (Last 24 hours) at 10/15/17 1221  Last data filed at 10/14/17 1900   Gross per 24 hour   Intake              240 ml   Output                0 ml   Net              240 ml        Physical Examination:             Constitutional:  No acute distress, cooperative, pleasant    ENT:  Oral mucous moist, oropharynx benign. Neck supple,    Resp:  CTA bilaterally. No wheezing/rhonchi/rales. No accessory muscle use   CV:  Regular rhythm, normal rate, no murmurs, gallops, rubs    GI:  Soft, non distended, LLQ tender to palpation. normoactive bowel sounds, no hepatosplenomegaly     Musculoskeletal:  No edema, warm, 2+ pulses throughout    Neurologic:  Moves all extremities. AAOx3     Psych:  Good insight, Not anxious nor agitated.   Skin:  Good turgor, no rashes or ulcers       Data Review:    Review and/or order of clinical lab test  Review and/or order of tests in the radiology section of CPT  Review and/or order of tests in the medicine section of CPT      Labs:     Recent Labs      10/15/17   0555  10/14/17   1332   WBC  6.6  7.2   HGB  17.0*  19.5*   HCT  49.2*  54.8*   PLT  218  226     Recent Labs      10/15/17   0555  10/14/17   1332   NA  147*  139   K  3.3*  3.6   CL  117*  106   CO2  21  22   BUN  12  16   CREA  0.67  0.90   GLU  81  110*   CA  7.0*  9.7     Recent Labs      10/14/17   1332   SGOT  17   ALT  19   AP  112   TBILI  0.9   TP  7.8   ALB  3.8   GLOB  4.0     Recent Labs      10/14/17   1316   INR  1.3*      No results for input(s): FE, TIBC, PSAT, FERR in the last 72 hours. No results found for: FOL, RBCF   No results for input(s): PH, PCO2, PO2 in the last 72 hours.   Recent Labs      10/14/17   1340   TROIQ  <0.04     No results found for: CHOL, CHOLX, CHLST, CHOLV, HDL, LDL, LDLC, DLDLP, TGLX, TRIGL, TRIGP, CHHD, CHHDX  Lab Results   Component Value Date/Time    Glucose (POC) 141 10/14/2017 01:14 PM     No results found for: COLOR, APPRN, SPGRU, REFSG, JESSE, PROTU, GLUCU, KETU, BILU, UROU, STEVIE, LEUKU, GLUKE, EPSU, BACTU, WBCU, RBCU, CASTS, UCRY      Medications Reviewed:     Current Facility-Administered Medications   Medication Dose Route Frequency    ondansetron (ZOFRAN) injection 4 mg  4 mg IntraVENous Q6H PRN    sodium chloride (NS) flush 5 mL  5 mL IntraVENous PRN    clindamycin (CLEOCIN) capsule 300 mg  300 mg Oral TID    albuterol-ipratropium (DUO-NEB) 2.5 MG-0.5 MG/3 ML  3 mL Nebulization Q6H PRN    sodium chloride (NS) flush 5-10 mL  5-10 mL IntraVENous Q8H    sodium chloride (NS) flush 5-10 mL  5-10 mL IntraVENous PRN    enoxaparin (LOVENOX) injection 40 mg  40 mg SubCUTAneous Q24H    aspirin chewable tablet 81 mg  81 mg Oral DAILY    0.9% sodium chloride infusion  100 mL/hr IntraVENous CONTINUOUS    ALPRAZolam (XANAX) tablet 0.25 mg  0.25 mg Oral QID PRN    calcium carbonate (TUMS) chewable tablet 200 mg [elemental]  200 mg Oral QID PRN    influenza vaccine 2017-18 (3 yrs+)(PF) (FLUZONE QUAD/FLUARIX QUAD) injection 0.5 mL  0.5 mL IntraMUSCular PRIOR TO DISCHARGE     ______________________________________________________________________  EXPECTED LENGTH OF STAY: - - -  ACTUAL LENGTH OF STAY:          [address], NP

## 2017-10-15 NOTE — H&P
[address] [place] [facility] [address]   HISTORY AND PHYSICAL       Name:  Christiana Calhoun   MR#:  [id number]   :  1955   Account #:  [de-identified]        Date of Adm:  10/14/2017       CHIEF COMPLAINT:  Slurring of the speech, dizziness,  vertigo   since earlier today. HISTORY OF PRESENT ILLNESS: The patient is a 76-year-old female   with a past medical history of elevated hemoglobin, the baseline   around 18.5, currently undergoing workup for possible lung and renal   cancer. The patient presents with a 4 hour history of dizziness, vertigo,   slurring of the speech and word-finding difficulty. As per the patient,   she was in the shower and started noticing some numbness in the tip   of the fingers, which was followed by some episodes of feeling vertigo. The patient was very alert and awake, was aware of the surroundings,   came out of the shower, then the family had started noticing that she   was having some slurring of the speech and sometimes having   difficulty finding words. The patient, all this time, was alert and very   much aware of the surroundings, did not notice any jerking movements   in the arms or legs, did not have any bladder or bowel incontinence. Currently, apparently, the symptoms are completely resolved. The   patient was brought to the ER. CT scan was done, and the decision to   give the patient thrombolytics, tPA, was taken. However, the patient   refused and an MRI scan was done. MRA and MRI scan of the neck   was done, which did not show any obstruction. The patient's symptoms   currently have resolved completely. Denied complaints of any chest   pain, , shortness of breath. PAST MEDICAL HISTORY:  No history of hypertension. history of   diabetes. MEDICATIONS: The patient is currently taking: .   2. Clindamycin for a staph infection in the right buttock area. Has 2   days of oral  antibiotics left.      SURGERIES: History of hemicolectomy 13 years back. The patient,   during the colonoscopy procedure, had a perforation and the patient   had to undergo hemicolectomy to prevent bleeding. SOCIAL HISTORY: About 35-pack-years of smoking. Does not use   alcohol. No recreational drugs. REVIEW OF SYSTEMS   CONSTITUTIONAL: Negative. Fevers negative, chills negative. Weight   loss. HEENT: Negative visual disturbance. Negative sore throat. Negative   earaches. Negative postnasal drip. PULMONARY: Negative cough, negative wheezing, negative   hemoptysis. Negative pleuritic chest pain. CARDIOVASCULAR: Negative chest pain. Negative shortness of   breath. Negative palpitations. Negative paroxysmal nocturnal dyspnea. GASTROINTESTINAL: Negative vomiting, negative abdominal pain,   negative diarrhea. GENITOURINARY: Negative urgency, negative frequency, negative   burning of urination. : No edema. CNS: Positive dizziness. Positive slurring of the speech, currently   resolved. Positive mild ataxia. PHYSICAL EXAMINATION   VITAL SIGNS: Currently the blood pressure was 130/89, 98%   saturation, afebrile, pulse rate of 84. HEENT: Pupils were equal, react to light, right-sided, left facial droop   was noted. The patient has a history of Lyme disease in 1982, had   nerve palsy as a result of that. Pupils were otherwise equal, reactive to   light. No pallor, no icterus. NECK: Supple. LUNGS: Clear. CARDIOVASCULAR: S1, S2 audible. No S3, S4.   ABDOMEN: Soft, nontender. EXTREMITIES: No edema. Strength 5/5 all 4 extremities. No   other motor or sensory deficit noted. Reflexes +2 bilaterally all 4   extremities. Plantars are bilaterally downgoing. LABORATORY:  Showed a normal CMP, WBC count was normal,   hemoglobin was 19.5, hematocrit 54.8, troponin was within normal   limits. ASSESSMENT AND PLAN   1. Transient ischemic attack. The patient will be started on aspirin. Neurology consult has been taken.  The patient already had an MRI scan   and MRA done, which was negative. Suspect underlying early   polycythemia may be causative of the TIA. Would probably need long-  term anticoagulation. We will leave the decision to the neurologist.   Elevated hemoglobin level needs further workup. Hematology consult   has been placed. 2. THE PATIENT IS FULL CODE. 3. Right buttock abscess. We will continue the clindamycin for 2 days. On examination the abscess is almost completely resolved.         MD Harleen Lu / Moni Moreno   D:  10/14/2017   17:00   T:  10/14/2017   23:53   Job #:  [id number]

## 2017-10-16 LAB
GLUCOSE BLD STRIP.AUTO-MCNC: 93 MG/DL (ref 65–100)
SERVICE CMNT-IMP: NORMAL

## 2017-10-16 PROCEDURE — 82962 GLUCOSE BLOOD TEST: CPT

## 2017-10-16 PROCEDURE — 92522 EVALUATE SPEECH PRODUCTION: CPT | Performed by: SPEECH-LANGUAGE PATHOLOGIST

## 2017-10-16 PROCEDURE — G8987 SELF CARE CURRENT STATUS: HCPCS

## 2017-10-16 PROCEDURE — G8980 MOBILITY D/C STATUS: HCPCS

## 2017-10-16 PROCEDURE — 74011250636 HC RX REV CODE- 250/636: Performed by: INTERNAL MEDICINE

## 2017-10-16 PROCEDURE — G8978 MOBILITY CURRENT STATUS: HCPCS

## 2017-10-16 PROCEDURE — 81270 JAK2 GENE: CPT

## 2017-10-16 PROCEDURE — G8988 SELF CARE GOAL STATUS: HCPCS

## 2017-10-16 PROCEDURE — 97530 THERAPEUTIC ACTIVITIES: CPT

## 2017-10-16 PROCEDURE — 97161 PT EVAL LOW COMPLEX 20 MIN: CPT

## 2017-10-16 PROCEDURE — 65660000000 HC RM CCU STEPDOWN

## 2017-10-16 PROCEDURE — G8989 SELF CARE D/C STATUS: HCPCS

## 2017-10-16 PROCEDURE — 97165 OT EVAL LOW COMPLEX 30 MIN: CPT

## 2017-10-16 PROCEDURE — 36415 COLL VENOUS BLD VENIPUNCTURE: CPT | Performed by: INTERNAL MEDICINE

## 2017-10-16 PROCEDURE — G8979 MOBILITY GOAL STATUS: HCPCS

## 2017-10-16 PROCEDURE — 74011250637 HC RX REV CODE- 250/637: Performed by: INTERNAL MEDICINE

## 2017-10-16 RX ADMIN — SODIUM CHLORIDE 100 ML/HR: 900 INJECTION, SOLUTION INTRAVENOUS at 13:47

## 2017-10-16 RX ADMIN — ENOXAPARIN SODIUM 40 MG: 40 INJECTION SUBCUTANEOUS at 16:51

## 2017-10-16 RX ADMIN — Medication 10 ML: at 22:00

## 2017-10-16 RX ADMIN — Medication 10 ML: at 13:48

## 2017-10-16 RX ADMIN — ALPRAZOLAM 0.25 MG: 0.5 TABLET ORAL at 00:08

## 2017-10-16 RX ADMIN — Medication 10 ML: at 06:42

## 2017-10-16 RX ADMIN — ALPRAZOLAM 0.25 MG: 0.5 TABLET ORAL at 10:46

## 2017-10-16 RX ADMIN — ASPIRIN 81 MG 81 MG: 81 TABLET ORAL at 10:47

## 2017-10-16 RX ADMIN — CLINDAMYCIN HYDROCHLORIDE 300 MG: 150 CAPSULE ORAL at 10:46

## 2017-10-16 RX ADMIN — ALPRAZOLAM 0.25 MG: 0.5 TABLET ORAL at 18:27

## 2017-10-16 NOTE — PROGRESS NOTES
[facility]  Medical Oncology at [facility]    Hematology / Oncology Progress Note    Reason for Visit:   Aysha Garcia is a 60 y.o. female who is seen in consultation at the request of Dr. Paz Bianchi for evaluation of polycythemia. History of Present Illness:   Ms. Bev Ortega is a 61 y/o female was admitted for neurological symptoms concerning for TIA/CVA. She p/w 4 hour h/o dizziness, vertigo, slurred speech and word-finding difficulty as well as some numbness in her fingers. Upon arrival in ED, she was found to have Hgb 19.5 (she reports her usual baseline is 18.5). She was evaluated by thrombolytics, tPA, but patient refused. MRI/MRA showed no abnormalities. Patient reports that all of her symptoms have completely resolved after approx 4 hours. She states that she was notified of an elevated hemoglobin value 1 week ago by her PCP, Dr. Denver Bookbinder. He recommended nephrology and pulmonary referrals due to concern for a kidney or lung tumor. No hematology referral per patient. She also states that blood work 3 months ago was normal. She does smoke cigarettes, 1ppd x 35 yrs. She was notified upon admission that she could not have a nicotine patch due to cardiac/CVA risks. She also reports nausea and diarrhea for the past 2 weeks, but has been staying hydrated with 1 gallon of water daily. She lost 8-lbs in the past 2 weeks. She also reports intermittent night sweats for the past 2 months as well as fatigue for the past 2 yrs. No fevers, chills, melena/hematochezia, CP, HA, blurry vision. No prior h/o neurological deficits, DVT/PE. INTERVAL HISTORY: HgB down to 16.3 this morning. Patient with phlebotomy performed this morning. Per daughter in law, patient again with slurred speech/speech changes during phlebotomy. Denies any other symptoms. Dr. Jarad Nation, Neurology, evaluated at the time and feels this is TIA vs stress response. CT head, MRI brain, and MRA x 2 negative.  LD normal, erythropoietin pending. JAK2 has been ordered but not obtained. Past Medical History:   Diagnosis Date    Cancer [facility])     cervical cancer    Cancer ([facility] [address]     skin cancer    Ill-defined condition     lyme disease      Past Surgical History:   Procedure Laterality Date    HX GI      isaias colectomy    HX GYN      partial hysterectomy    HX GYN      c section    HX ORTHOPAEDIC      RIGHT shoulder surgery    HX ORTHOPAEDIC      RIGHT foot surgery    NEUROLOGICAL PROCEDURE UNLISTED      lumber surgery      Social History   Substance Use Topics    Smoking status: Current Every Day Smoker     Packs/day: 1.00    Smokeless tobacco: Never Used    Alcohol use 6.0 oz/week     10 Shots of liquor per week      History reviewed. No pertinent family history. Current Facility-Administered Medications   Medication Dose Route Frequency    ondansetron (ZOFRAN) injection 4 mg  4 mg IntraVENous Q6H PRN    sodium chloride (NS) flush 5 mL  5 mL IntraVENous PRN    albuterol-ipratropium (DUO-NEB) 2.5 MG-0.5 MG/3 ML  3 mL Nebulization Q6H PRN    sodium chloride (NS) flush 5-10 mL  5-10 mL IntraVENous Q8H    sodium chloride (NS) flush 5-10 mL  5-10 mL IntraVENous PRN    enoxaparin (LOVENOX) injection 40 mg  40 mg SubCUTAneous Q24H    aspirin chewable tablet 81 mg  81 mg Oral DAILY    0.9% sodium chloride infusion  100 mL/hr IntraVENous CONTINUOUS    ALPRAZolam (XANAX) tablet 0.25 mg  0.25 mg Oral QID PRN    calcium carbonate (TUMS) chewable tablet 200 mg [elemental]  200 mg Oral QID PRN    influenza vaccine 2017-18 (3 yrs+)(PF) (FLUZONE QUAD/FLUARIX QUAD) injection 0.5 mL  0.5 mL IntraMUSCular PRIOR TO DISCHARGE      Allergies   Allergen Reactions    Darvocet A500 [Propoxyphene N-Acetaminophen] Shortness of Breath    Iodine Hives    Sulfa (Sulfonamide Antibiotics) Other (comments)        Review of Systems: A complete review of systems was obtained, negative except as described above.     Physical Exam:     Visit Vitals    BP 124/83 (BP Patient Position: At rest)    Pulse 75    Temp 98.6 °F (37 °C)    Resp 22    Ht 5' 1\" (1.549 m)    Wt 128 lb (58.1 kg)    SpO2 94%    BMI 24.19 kg/m2     ECOG PS: 1  General: Anxious, Intermittently tearful, No distress  Eyes: PERRLA, anicteric sclerae  HENT: Atraumatic with normal appearance of ears and nose; OP clear  Neck: Supple; no thyromegaly   Lymphatic: No cervical, supraclavicular, or inguinal adenopathy  Respiratory: CTAB, normal respiratory effort  CV: Normal rate, regular rhythm, no murmurs, no peripheral edema  GI: Soft, nontender, nondistended, no masses, no hepatomegaly, no splenomegaly  MS: Normal gait and station. Digits without clubbing or cyanosis. Skin: No rashes, ecchymoses, or petechiae. Normal temperature, turgor, and texture. Psych / Neuro: Alert, speech slow, moves all 4 extremities. Results:     Lab Results   Component Value Date/Time    WBC 6.1 10/15/2017 02:24 PM    HGB 16.3 10/15/2017 02:24 PM    HCT 47.6 10/15/2017 02:24 PM    PLATELET 672 78/54/8338 02:24 PM    MCV 98.3 10/15/2017 02:24 PM    ABS. NEUTROPHILS 3.5 10/15/2017 02:24 PM     Lab Results   Component Value Date/Time    Sodium 142 10/15/2017 02:24 PM    Potassium 3.8 10/15/2017 02:24 PM    Chloride 110 10/15/2017 02:24 PM    CO2 25 10/15/2017 02:24 PM    Glucose 119 10/15/2017 02:24 PM    BUN 12 10/15/2017 02:24 PM    Creatinine 0.75 10/15/2017 02:24 PM    GFR est AA >60 10/15/2017 02:24 PM    GFR est non-AA >60 10/15/2017 02:24 PM    Calcium 8.7 10/15/2017 02:24 PM    Glucose (POC) 93 10/16/2017 09:19 AM     Lab Results   Component Value Date/Time    Bilirubin, total 0.9 10/14/2017 01:32 PM    ALT (SGPT) 19 10/14/2017 01:32 PM    AST (SGOT) 17 10/14/2017 01:32 PM    Alk.  phosphatase 112 10/14/2017 01:32 PM    Protein, total 7.8 10/14/2017 01:32 PM    Albumin 3.8 10/14/2017 01:32 PM    Globulin 4.0 10/14/2017 01:32 PM     Imaging:  Brain MRA / MRI 10/14/17:  No flow limiting stenosis or intracranial aneurysm. 1. No acute or subacute ischemia or other acute intracranial abnormality. 2. Mild microvascular ischemic and other age-related change. CT Head 10/15/17  1. No acute or subacute ischemia or other acute intracranial abnormality. 2. Mild microvascular ischemic and other age-related change. Assessment and Recommendations:   61 y/o female with polcythemia and TIA. 1. Polycythemia: Unclear whether this is primary or secondary. Secondary causes include smoking, severe dehydration, potential EPO-producing tumor. Primary cause would be polcythemia vera. -- Recommend phlebotomy of 1 Unit - performed today 10/16/17  -- LDH normal. EPO pending. JAK2 ordered but has not yet been obtained. Will need to have this done prior to discharge. -- Start and continue ASA 81mg daily and continue upon discharge  -- I discussed smoking cessation with patient. I recommend her to quit smoking immediately and it would be useful to recheck Hgb in 1 month. -- She will need hematology follow up. Daughter in law states she may stay with them in [place] post discharge. Scheduled follow up with Dr. Kizzy Ruano on 10/30/17 at 1300.    2. TIA vs Stress Response: Several neurological symptoms have resolved without intervention. On ASA 81mg. Again this morning with speech changes. Neurology on board. Dr. Dania Trujillo evaluated this morning. 3. Tobacco abuse: Encouraged smoking cessation. Follow up with PCP outpatient. Daughter in-law states patient may stay in [place] with son after discharge. Follow up with Dr. Kizzy Ruano scheduled at [address] [place] [address] on 10/30/17 at 1300. We will follow while admitted.     Pt seen today in conjunction with STONEY Dooley    Signed By: Abilio Fagan DO     October 16, 2017

## 2017-10-16 NOTE — PROGRESS NOTES
Per week in a 7 day period patient states that she will drink 7 alcoholic beverages a week on average. Last beverage was Firday, episode occurred on Saturday.

## 2017-10-16 NOTE — PROGRESS NOTES
Patient experienced another episode of garbled speech. Family present. Neurologist MD Sage evaluated patient. Speech therapist also evaluated patient. Patient's NIH 2. Will continue to monitor patient.

## 2017-10-16 NOTE — PROGRESS NOTES
Problem: Motor Speech Impaired (Adult)  Goal: *Acute Goals and Plan of Care (Insert Text)  Speech therapy goals  Initiated 10/16/2017   1. Patient will complete conversational level speech tasks without misarticulations with 90% accuracy with min cues within 7 days   2. Patient will complete tongue twister tasks without misarticulations with 90% accuracy with min cues within 7 days   SPEECH LANGUAGE PATHOLOGY EVALUATION  Patient: Dory Ash (89 y.o. female)  Date: 10/16/2017  Primary Diagnosis: TIA (transient ischemic attack)  TIA (transient ischemic attack)  TIA (transient ischemic attack)  TIA (transient ischemic attack)        Precautions:  fall        ASSESSMENT :  Based on the objective data described below, the patient presents with unusual articulation errors that are atypical for dysarthria or aphasia. Her speech was fluent with misarticulation errors that are common pediatric articulation errors such as substituting /w/ for /r/, /w/ for /l/, and /f/ for /th/.  Speech pattern was also immature and sounded like \"baby talk. \"  She was able to repeat with improved accuracy when cued for slowed rate and correct production. Errors more predominant with conversation compared to structured tasks and most noticeable when neurologist was in the room compared to other staff members. She was able to repeat tongue twisters with only mild misarticulations. Given negative MRI/MRA x2 with atypical symptoms, do not suspect this is neurological in origin, however, will follow to address compensatory strategies as appropriate. Patient will benefit from skilled intervention to address the above impairments.   Patients rehabilitation potential is considered to be Fair  Factors which may influence rehabilitation potential include:   [ ]              None noted  [X]              Mental ability/status  [ ]              Medical condition  [ ]              Home/family situation and support systems  [ ] Safety awareness  [ ]              Pain tolerance/management  [ ]              Other:        PLAN :  Recommendations and Planned Interventions:  --SLP to follow for a trial of SLP treatment   --would consider psych workup given atypical presentation with inconsistent symptoms   Frequency/Duration: Patient will be followed by speech-language pathology 2 times a week to address goals. Discharge Recommendations: To Be Determined       SUBJECTIVE:   Patient stated Bryant Retana my awtewies to neurologist (asking \"what about my arteries\" in regards to testing). OBJECTIVE:       Past Medical History:   Diagnosis Date    Cancer ([facility] [address]       cervical cancer    Cancer ([facility] [address]       skin cancer    Ill-defined condition       lyme disease     Past Surgical History:   Procedure Laterality Date    HX GI         isaias colectomy    HX GYN         partial hysterectomy    HX GYN         c section    HX ORTHOPAEDIC         RIGHT shoulder surgery    HX ORTHOPAEDIC         RIGHT foot surgery    NEUROLOGICAL PROCEDURE UNLISTED         lumber surgery     Prior Level of Function/Home Situation:   Home Situation  Home Environment: Private residence  # Steps to Enter: 4  One/Two Story Residence: One story  Living Alone: Yes  Support Systems: Family member(s)  Patient Expects to be Discharged to[de-identified] Private residence  Current DME Used/Available at Home: None  Tub or Shower Type: Tub/Shower combination  Mental Status:  Neurologic State: Alert  Orientation Level: Oriented X4  Cognition: Appropriate for age attention/concentration           Motor Speech:    patient with fluent speech, was able to repeat word phrase and sentence level as well as tongue twister tasks with intermittent articulation errors. Patient with immature speech, sounding like \"baby talk\" with articulation errors common amongst pediatric patients including  /w/ for /r/, /w/ for /l/, and /f/ for /th/ substitutions.   Was able to correct with cues for extra time and correct production. Most noticeable in conversational level tasks. Certainly an atypical presentation for dysarthria. No aphasia. Language Comprehension and Expression:              Neuro-Linguistics:                                                  Pragmatics:        Voice:                                                     G Codes: In compliance with CMSs Claims Based Outcome Reporting, the following G-code set was chosen for this patient based the use of the NOMS functional outcome to quantify this patient's level of motor speech impairment. Using the NOMS, the patient was determined to be at level 5 for motor speech which correlates with the CJ= 20-39% level of severity. Based on the objective assessment provided within this note, the current, goal, and discharge g-codes are as follows: Motor   Current  CJ= 20-39%   Goal  CI= 1-19%         NOMS Motor Speech:  Level 1 (CN):  100% unintelligible  Level 2 (CM):  Communication partner responsible for message; can do CV or automatic words w/ max cues but rarely intelligible in context  Level 3 (CL): communication partner primarily responsible for message but says CV/automatic words intelligibly; mod cues to say simple words/phrases  Level 4 (CK): In structured conversation with familiar listener can say simple words and phrases. Mod cues for simple sentences  Level 5 (CJ):  Uses simple sentences for ADLs with familiar and unfamiliar listener; min cues for complex sentences  Level 6 (CI):  Intelligible in ADLs; difficulty in voc/social activites; rare cues for complex message; uses comp strategies  Level 7 ([address] [place] [address]):  Intelligible in all activities. May occasionally use compensatory strategies. SAHRA. (2003). National Outcomes Measurement System (NOMS): Adult Speech-Language Pathology User's Guide.       Pain:  Pain Scale 1: Numeric (0 - 10)  Pain Intensity 1: 0     After treatment:   [ ]              Patient left in no apparent distress sitting up in chair  [X]              Patient left in no apparent distress in bed  [X]              Call bell left within reach  [X]              Nursing notified  [X]              Caregiver present  [ ]              Bed alarm activated      COMMUNICATION/EDUCATION:   The patients plan of care including recommendations and planned interventions was discussed with: Occupational Therapist, Registered Nurse and Physician. Patient was educated regarding Her deficit(s) of atypical dysarthria as this relates to Her diagnosis of negative CVA workup. She demonstrated Fair understanding as evidenced by verbalization of understanding. [X]  Patient/family have participated as able in goal setting and plan of care. [X]  Patient/family agree to work toward stated goals and plan of care. [ ]  Patient understands intent and goals of therapy, but is neutral about his/her participation. [ ]  Patient is unable to participate in goal setting and plan of care. Thank you for this referral.  Jody Lopez M.CD.  CCC-SLP   Time Calculation: 21 mins

## 2017-10-16 NOTE — PROGRESS NOTES
Bedside and Verbal shift change report given to CHANCE Ellington (oncoming nurse) by Hesham Peters RN (offgoing nurse). Report included the following information SBAR, Kardex, Intake/Output, MAR, Recent Results and Cardiac Rhythm NSR.

## 2017-10-16 NOTE — PROGRESS NOTES
Patient slurring \"L\"s, \"sh\"s and \"th\"s - pronouncing them like W's    Slurred speech occurred after blood drawn by Dialysis. Vital Signs WNL     NIH 2    83HR  18RR  123/65  Blood Sugar 93  Temp: 98.3    NP Marcelo called, will come to examine.

## 2017-10-16 NOTE — PROGRESS NOTES
Patient's slurred and garbled speech has now resolved. NP Woodard Litten is aware. Patient has requested a stool softner, awaiting new orders regarding bowel regiment.

## 2017-10-16 NOTE — ROUTINE PROCESS
Bedside shift change report given to Alee Escalante RN (oncoming nurse) by Cristy Choudhury RN (offgoing nurse). Report included the following information SBAR, Kardex, ED Summary, Procedure Summary, Intake/Output, MAR, Accordion, Recent Results and Cardiac Rhythm NSR.

## 2017-10-16 NOTE — PROGRESS NOTES
IDR/SLIDR Summary          Patient: Steve Santillan MRN: [id number]    Age: 60 y.o. YOB: 1955 Room/Bed: [facility]   Admit Diagnosis: TIA (transient ischemic attack)  TIA (transient ischemic attack)  TIA (transient ischemic attack)  TIA (transient ischemic attack)  Principal Diagnosis: TIA (transient ischemic attack)   Goals: Monitor speech  Readmission: NO  Quality Measure: Not applicable  VTE Prophylaxis: Chemical  Influenza Vaccine screening completed? YES  Pneumococcal Vaccine screening completed? YES  Mobility needs: No   Nutrition plan:No  Consults:P.T, O.T. and Speech    Financial concerns:NA  Escalated to CM? NO  RRAT Score: 10   Interventions:  Testing due for pt today?  YES  LOS: 2 days Expected length of stay 2 days  Discharge plan: TBD   PCP: Geneva Ellis MD  Transportation needs: NA    Days before discharge:two or more days before discharge   Discharge disposition:TBD    Signed:     Nidia Schilling RN  10/16/2017  8:26 AM

## 2017-10-16 NOTE — PROGRESS NOTES
Bedside shift change report given to Blanca Gunter (oncoming nurse) by Fern Ordoñez (offgoing nurse). Report included the following information SBAR, Kardex, Procedure Summary, Intake/Output, MAR, Accordion, Recent Results and Med Rec Status.

## 2017-10-16 NOTE — PROGRESS NOTES
Occupational Therapy neurological EVALUATION with discharge  Patient: Pratibha Richardson (75 y.o. female)  Date: 10/16/2017  Primary Diagnosis: TIA (transient ischemic attack)  TIA (transient ischemic attack)  TIA (transient ischemic attack)  TIA (transient ischemic attack)        Precautions:        ASSESSMENT:  Based on the objective data described below, the patient presents with IND to SBA for all self-care tasks. Pt with decreased articulation with speech, no slurred speech noted; please see SLP note for details. Pt functional strength, coordination, balance, sensation, and vision all WFL. Pt alert and oriented x4. Upon entry, pt resting in bed with son and daughter-in-law at bedside; pt with fluent, normal speech; daughter-in-law requested therapist to verbalize \"toothpaste\", then pt had \"episode\" where speech had misarticulation. Pt scored 66/66 on Fugl Patrick Assessment indicative of no impairment, 1 minor LOB able to correct independently with eyes closed, however, with all dynamic standing tasks, balance intact. No acute OT needs at this time. Neurology notified; pt may benefit from psych consult as symptoms seem to be inconsistent with staff members and manifested when family members interacting with medical personnel. Further skilled acute occupational therapy is not indicated at this time. Discharge Recommendations: None  Further Equipment Recommendations for Discharge: none     SUBJECTIVE:   Patient stated Yvonna Osgood.  Pt with decreased articulation, no slurred speech noted.     OBJECTIVE DATA SUMMARY:   HISTORY:   Past Medical History:   Diagnosis Date    Cancer ([facility] [address]     cervical cancer    Cancer ([facility] [address]     skin cancer    Ill-defined condition     lyme disease     Past Surgical History:   Procedure Laterality Date    HX GI      isaias colectomy    HX GYN      partial hysterectomy    HX GYN      c section    HX ORTHOPAEDIC      RIGHT shoulder surgery    HX ORTHOPAEDIC      RIGHT foot surgery  NEUROLOGICAL PROCEDURE UNLISTED      lumber surgery       Prior Level of Function/Home Situation: Pt IND. Expanded or extensive additional review of patient history:     Home Situation  Home Environment: Private residence  # Steps to Enter: 4  Rails to Enter: Yes  Hand Rails : Right  One/Two Story Residence: One story  Living Alone: Yes  Support Systems: Child(randolph), Family member(s)  Patient Expects to be Discharged to[de-identified] Private residence  Current DME Used/Available at Home: None  Tub or Shower Type: Tub/Shower combination  [x]  Right hand dominant   []  Left hand dominant    EXAMINATION OF PERFORMANCE DEFICITS:  Cognitive/Behavioral Status:  Neurologic State: Alert; Appropriate for age  Orientation Level: Oriented X4  Cognition: Follows commands; Appropriate safety awareness; Appropriate for age attention/concentration; Appropriate decision making  Perception: Appears intact  Perseveration: No perseveration noted  Safety/Judgement: Awareness of environment; Fall prevention;Good awareness of safety precautions; Home safety; Insight into deficits    Skin: appears intact    Edema: none noted in BUEs    Hearing: Auditory  Auditory Impairment: None    Vision/Perceptual:    Tracking: Able to track stimulus in all quadrants w/o difficulty                 Diplopia: No         Corrective Lenses: Glasses    Range of Motion:    AROM: Within functional limits  PROM: Within functional limits                      Strength:    Strength: Within functional limits                Coordination:  Coordination: Within functional limits  Fine Motor Skills-Upper: Left Intact; Right Intact    Gross Motor Skills-Upper: Left Intact; Right Intact    Tone & Sensation:    Tone: Normal  Sensation: Intact                      Balance:  Sitting: Intact  Standing: Intact    Functional Mobility and Transfers for ADLs:  Bed Mobility:  Supine to Sit: Independent  Sit to Supine: Independent    Transfers:  Sit to Stand: Independent  Functional Transfers  Toilet Transfer : Independent    ADL Assessment:  Feeding: Independent    Oral Facial Hygiene/Grooming: Independent    Bathing: Modified independent    Upper Body Dressing: Independent    Lower Body Dressing: Independent                     ADL Intervention and task modifications:       Cognitive Retraining  Safety/Judgement: Awareness of environment; Fall prevention;Good awareness of safety precautions; Home safety; Insight into deficits      Functional Measure:   Fugl-Patrick Assessment of Motor Recovery after Stroke:     Reflex Activity  Flexors/Biceps/Fingers: Can be elicited  Extensors/Triceps: Can be elicited  Reflex Subtotal: 4    Volitional Movement Within Synergies  Shoulder Retraction: Full  Shoulder Elevation: Full  Shoulder Abduction (90 degrees): Full  Shoulder External Rotation: Full  Elbow Flexion: Full  Forearm Supination: Full  Shoulder Adduction/Internal Rotation: Full  Elbow Extension: Full  Forearm Pronation: Full  Subtotal: 18    Volitional Movement Mixing Synergies  Hand to Lumbar Spine: Full  Shoulder Flexion (0-90 degrees): Full  Pronation-Supination: Full  Subtotal: 6    Volitional Movement With Little or No Synergy  Shoulder Abduction (0-90 degrees): Full  Shoulder Flexion ( degrees): Full  Pronation/Supination: Full  Subtotal : 6    Normal Reflex Activity  Biceps, Triceps, Finger Flexors:  Full  Subtotal : 2    Upper Extremity Total   Upper Extremity Total: 36    Wrist  Stability at 15 Degree Dorsiflexion: Full  Repeated Dorsiflexion/ Volar Flexion: Full  Stability at 15 Degree Dorsiflexion: Full  Repeated Dorsiflexion/ Volar Flexion: Full  Circumduction: Full  Wrist Total: 10    Hand  Mass Flexion: Full  Mass Extension: Full  Grasp A: Full  Grasp B: Full  Grasp C: Full  Grasp D: Full  Grasp E: Full  Hand Total: 14    Coordination/Speed  Tremor: None  Dysmetria: None  Time: <1s  Coordination/Speed Total : 6    Total A-D  Total A-D (Motor Function): 66/66     Percentage of impairment CH  0% CI  1-19% CJ  20-39% CK  40-59% CL  60-79% CM  80-99% CN  100%   Fugl-Patrick score: 0-66 66 53-65 39-52 26-38 13-25 1-12   0      This is a reliable/valid measure of arm function after a neurological event. It has established value to characterize functional status and for measuring spontaneous and therapy-induced recovery; tests proximal and distal motor functions. Fugl-Patrick Assessment  UE scores recorded between five and 30 days post neurologic event can be used to predict UE recovery at six months post neurologic event. Severe = 0-21 points   Moderately Severe = 22-33 points   Moderate = 34-47 points   Mild = 48-66 points  EMMETT Johnson, ALEK Pritchard, & HANANE Neville (1992). Measurement of motor recovery after stroke: Outcome assessment and sample size requirements. Stroke, 23, pp. 2613-6776.   ------------------------------------------------------------------------------------------------------------------------------------------------------------------  MCID:  Stroke:   Amanda Reynaga et al, 2001; n = 171; mean age 79 (5) years; assessed within 16 (12) days of stroke, Acute Stroke)  FMA Motor Scores from Admission to Discharge   10 point increase in FMA Upper Extremity = 1.5 change in discharge FIM   10 point increase in FMA Lower Extremity = 1.9 change in discharge FIM  MDC:   Stroke:   Anentte Jeong al, 2008, n = 14, mean age = 59.9 (14.6) years, assessed on average 14 (6.5) months post stroke, Chronic Stroke)   FMA = 5.2 points for the Upper Extremity portion of the assessment     G codes: In compliance with CMSs Claims Based Outcome Reporting, the following G-code set was chosen for this patient based on their primary functional limitation being treated: The outcome measure chosen to determine the severity of the functional limitation was the [facility] with a score of 66/66 which was correlated with the impairment scale. ?  Self Care:     - CURRENT STATUS:  - 0% impaired, limited or restricted    - GOAL STATUS: CH - 0% impaired, limited or restricted    - D/C STATUS:  CH - 0% impaired, limited or restricted        Occupational Therapy Evaluation Charge Determination   History Examination Decision-Making   LOW Complexity : Brief history review  LOW Complexity : 1-3 performance deficits relating to physical, cognitive , or psychosocial skils that result in activity limitations and / or participation restrictions  LOW Complexity : No comorbidities that affect functional and no verbal or physical assistance needed to complete eval tasks       Based on the above components, the patient evaluation is determined to be of the following complexity level: LOW      Activity Tolerance:   VSS  Please refer to the flowsheet for vital signs taken during this treatment. After treatment:   []  Patient left in no apparent distress sitting up in chair  [x]  Patient left in no apparent distress in bed  [x]  Call bell left within reach  [x]  Nursing notified  []  Caregiver present  []  Bed alarm activated    COMMUNICATION/EDUCATION:   Findings and recommendations were discussed with: Physical Therapist, Speech Therapist,  and neurologist    Patient was educated regarding Her deficit(s) of decreased articulation as this relates to Her diagnosis of r/o TIA. She demonstrated Excellent understanding as evidenced by verbalizing understanding. [x]      Home safety education was provided and the patient/caregiver indicated understanding. [x]      Patient/family have participated as able and agree with findings and recommendations. []      Patient is unable to participate in plan of care at this time.     Thank you for this referral.  Bolivar Davidson OT  Time Calculation: 19 mins

## 2017-10-16 NOTE — PROGRESS NOTES
Akhil Hurley is a 60 y.o. female admitted with multiple symptoms including dizziness, speech difficulty, facial droop, shaky legs, numbness both hands and had extensive stroke work up which was negative. Had another episode yesterday where she could not talk right and lasted about 3-4 hours  Has Hgb of>19 and had phlebotomy done this AM and during the procedure again developed speech difficulty which is ongoing. Is mixing syllables and says she cant form words right. No focal motor sensory deficits. EXAM  Exam:  Visit Vitals    /85 (BP 1 Location: Right arm, BP Patient Position: At rest)    Pulse 77    Temp 97.8 °F (36.6 °C)    Resp 20    Ht 5' 1\" (1.549 m)    Wt 58.1 kg (128 lb)    SpO2 98%    BMI 24.19 kg/m2     Gen:Alert  CV: RRR  Lungs: non labored breathing  Abd: non distending  Neuro: A&O x 3, intermittently mispronounces a word, speech sounds immature as in very young children. Comprehension intact  CN II-XII: PERRL, EOMI, face symmetric, tongue/palate midline  Motor: strength 5/5 all four ext  Sensory: intact to LT  Gait: normal    LABS/ IMAGING  MRI Results (most recent):    Results from Hospital Encounter encounter on 10/14/17   MRA NECK WO CONT   Narrative INDICATION:  slurred speech, vertigo    EXAM: MRA NECK. COMPARISON:  None    TECHNIQUE: 2-D time-of-flight MRA of the neck was performed. Multiplanar  reconstructions were obtained. CONTRAST: None administered    FINDINGS:    The aortic arch level is not included. . The common carotid and internal carotid  arteries are patent with no obvious flow-limiting stenosis. . The vertebral  arteries are codominant and patent. Impression IMPRESSION:  No flow-limiting stenosis on 2-D MRA of the neck.         Lab Results   Component Value Date/Time    Cholesterol, total 193 10/15/2017 02:24 PM    HDL Cholesterol 51 10/15/2017 02:24 PM    LDL, calculated 104.6 10/15/2017 02:24 PM    VLDL, calculated 37.4 10/15/2017 02:24 PM    Triglyceride 187 10/15/2017 02:24 PM    CHOL/HDL Ratio 3.8 10/15/2017 02:24 PM         ASSESSMENT  Hospital Problems  Date Reviewed: 10/15/2017          Codes Class Noted POA    * (Principal)TIA (transient ischemic attack) ICD-10-CM: G45.9  ICD-9-CM: 435.9  10/14/2017 Yes               PLAN  Third episode of speech disturbance-unclear if this is recurrent TIA or stress induced symptom  Defer calling code S again  Will continue to monitor closely  Continue ASA.  Add statin  Observe inpatient until symptom free for 24 hours    Chris Simpson MD

## 2017-10-16 NOTE — PROGRESS NOTES
Problem: Falls - Risk of  Goal: *Absence of Falls  Document Yao Fall Risk and appropriate interventions in the flowsheet.    Outcome: Progressing Towards Goal  Fall Risk Interventions:  Mobility Interventions: Assess mobility with egress test, Communicate number of staff needed for ambulation/transfer, OT consult for ADLs, Patient to call before getting OOB, PT Consult for mobility concerns           Medication Interventions: Assess postural VS orthostatic hypotension, Evaluate medications/consider consulting pharmacy, Patient to call before getting OOB, Teach patient to arise slowly           History of Falls Interventions: Consult care management for discharge planning, Door open when patient unattended, Evaluate medications/consider consulting pharmacy, Investigate reason for fall, Room close to nurse's station

## 2017-10-16 NOTE — INTERDISCIPLINARY ROUNDS
IDR/SLIDR Summary          Patient: Rakel Loza MRN: [id number]    Age: 60 y.o. YOB: 1955 Room/Bed: [facility]   Admit Diagnosis: TIA (transient ischemic attack)  TIA (transient ischemic attack)  TIA (transient ischemic attack)  TIA (transient ischemic attack)  Principal Diagnosis: TIA (transient ischemic attack)   Goals: Safety, PT/OT, d/c planning  Readmission: NO  Quality Measure: Not applicable  VTE Prophylaxis: Chemical  Influenza Vaccine screening completed? YES  Pneumococcal Vaccine screening completed? NO  Mobility needs: No   Nutrition plan:No  Consults: P. T and O.T. Financial concerns:No  Escalated to CM? NO  RRAT Score: 10   Interventions  Testing due for pt today?  YES  LOS: 2 days Expected length of stay 3-4 days  Discharge plan: TBD   PCP: Mikala Cody MD  Transportation needs: No    Days before discharge:two or more days before discharge   Discharge disposition: TBD    Signed:     Hayley Maxwell RN  10/17/17  0800 am

## 2017-10-16 NOTE — PROGRESS NOTES
Hospitalist Progress Note  Nuvia Grady NP  Answering service: [phone] from in house phone  Cell: [phone]      Date of Service:  10/16/2017  NAME:  Oliver Ellison  :  1955  MRN:  [id number]      Admission Summary:     The patient is a 58-year-old female with a past medical history of elevated hemoglobin, the baseline   around 18.5, currently undergoing workup for possible lung and renal   cancer. The patient presents with a 4 hour history of dizziness, vertigo,   slurring of the speech and word-finding difficulty. As per the patient,   she was in the shower and started noticing some numbness in the tip   of the fingers, which was followed by some episodes of feeling vertigo. The patient was very alert and awake, was aware of the surroundings,   came out of the shower, then the family had started noticing that she   was having some slurring of the speech and sometimes having   difficulty finding words. The patient, all this time, was alert and very   much aware of the surroundings, did not notice any jerking movements   in the arms or legs, did not have any bladder or bowel incontinence. Currently, apparently, the symptoms are completely resolved. The   patient was brought to the ER. CT scan was done, and the decision to   give the patient thrombolytics, tPA, was taken. However, the patient   refused and an MRI scan was done. MRA and MRI scan of the neck   was done, which did not show any obstruction. Interval history / Subjective:       Called to bedside by nurse who states that the patient is having recurrence of slurred speech. Upon arrival to the bedside the patients does not have typical aphasia or dysarthria . This is third episode and started just after phlebotomy to drain blood ( 1 unit). Code stroke was called yesterday afternoon and she was reimaged again.  Holding off on calling further code strokes per neurology orders, Dr. Evelyn Ramirez also at bedside to exam patient. Patient skipping syllables and replacing \"sh\" with 'Th\" and \"L \" with \"w\". Not a typically presentation. Curious if may be stress induced. Assessment & Plan:     Possible TIA:   - patient presented with symptoms consistent with TIA   - MRI/MRA negative for acute stroke   - will order Lipid profile and A1C for tomorrow  - speech disturbance reoccured yesterday afternoon and today   - neurology following  - continue with aspirin   - smoking cessation counseling with patient     Right Buttock Abscess:   - almost healed  - continue clindamycin     Polycythemia (POA) :   - hgb down to 16.3 this AM  - heme/onc consulted and recommend phlebotomy of 1 unit - completed this morning   - JAZZY, LD and erythropoietin ordered  - needs outpatient follow up   - continue with aspirin     Code status: Full   DVT prophylaxis: Lovenox     Care Plan discussed with: Patient/Family and Nurse   Disposition: Home w/Family     Hospital Problems  Date Reviewed: 10/15/2017          Codes Class Noted POA    * (Principal)TIA (transient ischemic attack) ICD-10-CM: G45.9  ICD-9-CM: 435.9  10/14/2017 Yes                Review of Systems:   A comprehensive review of systems was negative except for: Cardiovascular: positive for fatigue  Gastrointestinal: positive for nausea, diarrhea and abdominal pain       Vital Signs:    Last 24hrs VS reviewed since prior progress note. Most recent are:  Visit Vitals    BP (!) 119/91    Pulse 71    Temp 98.4 °F (36.9 °C)    Resp 17    Ht 5' 1\" (1.549 m)    Wt 58.1 kg (128 lb)    SpO2 96%    BMI 24.19 kg/m2       No intake or output data in the 24 hours ending 10/16/17 1247     Physical Examination:             Constitutional:  No acute distress, cooperative, pleasant    ENT:  Oral mucous moist, oropharynx benign. Neck supple,    Resp:  CTA bilaterally. No wheezing/rhonchi/rales.  No accessory muscle use   CV:  Regular rhythm, normal rate, no murmurs, gallops, rubs    GI:  Soft, non distended, LLQ tender to palpation. normoactive bowel sounds, no hepatosplenomegaly     Musculoskeletal:  No edema, warm, 2+ pulses throughout    Neurologic:  Moves all extremities. AAOx3, irregular speech pattern     Psych:  Good insight, Not anxious nor agitated. Skin:  Good turgor, no rashes or ulcers       Data Review:    Review and/or order of clinical lab test  Review and/or order of tests in the radiology section of CPT  Review and/or order of tests in the medicine section of CPT      Labs:     Recent Labs      10/15/17   1424  10/15/17   0555   WBC  6.1  6.6   HGB  16.3*  17.0*   HCT  47.6*  49.2*   PLT  212  218     Recent Labs      10/15/17   1424  10/15/17   0555  10/14/17   1332   NA  142  147*  139   K  3.8  3.3*  3.6   CL  110*  117*  106   CO2  25  21  22   BUN  12  12  16   CREA  0.75  0.67  0.90   GLU  119*  81  110*   CA  8.7  7.0*  9.7     Recent Labs      10/14/17   1332   SGOT  17   ALT  19   AP  112   TBILI  0.9   TP  7.8   ALB  3.8   GLOB  4.0     Recent Labs      10/15/17   1424  10/14/17   1316   INR  1.0  1.3*   PTP  10.4   --       No results for input(s): FE, TIBC, PSAT, FERR in the last 72 hours. No results found for: FOL, RBCF   No results for input(s): PH, PCO2, PO2 in the last 72 hours.   Recent Labs      10/14/17   1340   TROIQ  <0.04     Lab Results   Component Value Date/Time    Cholesterol, total 193 10/15/2017 02:24 PM    HDL Cholesterol 51 10/15/2017 02:24 PM    LDL, calculated 104.6 10/15/2017 02:24 PM    Triglyceride 187 10/15/2017 02:24 PM    CHOL/HDL Ratio 3.8 10/15/2017 02:24 PM     Lab Results   Component Value Date/Time    Glucose (POC) 93 10/16/2017 09:19 AM    Glucose (POC) 116 10/15/2017 02:12 PM    Glucose (POC) 141 10/14/2017 01:14 PM     No results found for: COLOR, APPRN, SPGRU, REFSG, JESSE, PROTU, GLUCU, KETU, BILU, UROU, STEVIE, LEUKU, GLUKE, EPSU, BACTU, WBCU, RBCU, CASTS, UCRY      Medications Reviewed:     Current Facility-Administered Medications   Medication Dose Route Frequency    ondansetron (ZOFRAN) injection 4 mg  4 mg IntraVENous Q6H PRN    sodium chloride (NS) flush 5 mL  5 mL IntraVENous PRN    albuterol-ipratropium (DUO-NEB) 2.5 MG-0.5 MG/3 ML  3 mL Nebulization Q6H PRN    sodium chloride (NS) flush 5-10 mL  5-10 mL IntraVENous Q8H    sodium chloride (NS) flush 5-10 mL  5-10 mL IntraVENous PRN    enoxaparin (LOVENOX) injection 40 mg  40 mg SubCUTAneous Q24H    aspirin chewable tablet 81 mg  81 mg Oral DAILY    0.9% sodium chloride infusion  100 mL/hr IntraVENous CONTINUOUS    ALPRAZolam (XANAX) tablet 0.25 mg  0.25 mg Oral QID PRN    calcium carbonate (TUMS) chewable tablet 200 mg [elemental]  200 mg Oral QID PRN    influenza vaccine 2017-18 (3 yrs+)(PF) (FLUZONE QUAD/FLUARIX QUAD) injection 0.5 mL  0.5 mL IntraMUSCular PRIOR TO DISCHARGE     ______________________________________________________________________  EXPECTED LENGTH OF STAY: 2d 2h  ACTUAL LENGTH OF STAY:          2                 Rebecca Maria NP

## 2017-10-16 NOTE — CONSULTS
[address]   [facility], [address] [place] [address]       Name:  Nidia Hollins   MR#:  [id number]   :  1955   Account #:  [de-identified]    Date of Consultation:  10/15/2017   Date of Adm:  10/14/2017       HISTORY OF PRESENT ILLNESS: This is a 23-year-old right-handed   female who was admitted yesterday with complaints of dizziness and   word finding, slurred speech, facial droop. The patient tells me that she   was lying on the couch and her fingers felt numb bilaterally. She got up   to shower, and her legs felt shaky and she felt weak all over. After getting out of the shower she could hear her granddaughter   knocking at the door trying to get in, but she could not respond verbally   to her. She felt dizzy and could not get up from the toilet. She describes   her head as feeling like it was moving \"like vertigo. \". She does have a   history of vertigo several times over the last 6 months. The patient   states she was unaware that her speech was slurred or that she had   facial droop. Apparently, these symptoms all lasted for 4 hours and   resolved on arrival to the emergency department. She was seen by   Teleneurology and apparently was offered tPA, but refused. Her CT of   the head was negative. MRI of the brain reviewed and negative. MRA   of the head and neck was unremarkable. She was found to have a   hemoglobin of 19.5, which the patient reported was a new issue. She   does smoke 1 pack per day for 35 years, complained of weight loss, 8   pounds in the last 2 weeks with night sweats for the last 2 months,   fatigue for the last 2 years, and nausea and diarrhea for the last 2   weeks. The patient reports being back at baseline and ready to go   home. PAST MEDICAL HISTORY   1. Lyme disease. 2. Cervical cancer. 3. Skin cancer. 4. Status post hysterectomy with ovaries intact.    5. History of injury to her colon during a colonoscopy requiring   a hemicolectomy. 6. Shoulder surgery on the right. 7. Lumbar spine surgery. 8. Buttock abscess that is healing and being treated with clindamycin. REVIEW OF SYSTEMS: As per past medical history and HPI. Otherwise, reviewed and negative. MEDICATIONS AT HOME   1. Clindamycin. 2. Vitamin D.   3. Naprosyn. 4. Combivent. ALLERGIES   1. DARVOCET. 2. SULFA. SOCIAL HISTORY: She lives in [place], was just in [place]   visiting her son and his family. She is a  for the   last 9 years. She tells me that she drinks 3 alcoholic drinks a day only 2 or 3   days a week. Family seemed to indicate to others, according to the   chart note, that she drinks heavily. They are not present currently to   verify. She, again, smokes 1 pack per day for 35 years. No drug use. FAMILY HISTORY: She denies any neurological issues in her family. She has one son who had a heart attack in his early 25s. PHYSICAL EXAMINATION   VITAL SIGNS: Blood pressure was 100/69, pulse 77, respiratory rate   18, saturating 94% on room air, temperature was 98.4. GENERAL: She is a well nourished, well-developed healthy-appearing   female sitting in bed cross legged, in no distress. HEART: Has a regular rate and rhythm without murmurs. Her carotids   are 2+, no bruits. EXTREMITIES: Warm. No edema, 2+ radial pulses. NEUROLOGIC:   MENTAL STATUS: She is alert. She is oriented x4. Speech is not   dysarthric. Language is intact. Her attention, memory and fund of   knowledge are appropriate. CRANIAL NERVES: No facial asymmetry or ptosis. Extraocular eye   movements are intact without diplopia or nystagmus. Her visual fields   are full. Pupils equally round and reactive. Her tongue is midline. Her   palate elevated symmetrically. Trapezius and sternocleidomastoid are   5/5. MOTOR: 5/5 throughout. No pronator drift. No tremors. SENSORY: Intact to light touch and pinprick throughout.  Reflexes were   2+ and symmetric in the upper extremities, 1+ in the lower extremities. Toes downgoing. Coordination intact finger-to-nose, heel-to-shin, rapid   alternating movements. Gait not assessed. STUDIES AND REPORTS: In addition to that mentioned above, her   EKG showed normal sinus rhythm. Her BMP today had a potassium of   3.3. Her CMP on presentation was normal. Her alcohol level was less   than 10. She did not have an echocardiogram ordered. Hemoglobin   A1c is 5.5, and her LDL was 104.6. ASSESSMENT AND PLAN: This is a 42-year-old female presenting   with dysarthria, right facial weakness and vertigo with patient reporting   a sense of generalized weakness, shakiness earlier in the day   yesterday. Exam is nonfocal and unremarkable. History is certainly   concerning for transient ischemic attack and elevated hemoglobin    could put her at risk for stroke. She also has stroke risk factor of   extensive smoking history and has an LDL of 100. The differential includes hypotension, dehydration, and alcohol withdrawal given her sense of general weakness and shakiness. Agree with the   aspirin 81 mg a day as started, and Hematology has already seen the   patient and recommended phlebotomy of 1 unit of blood. After this,   she is stable for discharge from a neuro standpoint. The patient is   encouraged to stop smoking today. She seems unwilling.         MD MARY Longo / CORONA   D:  10/15/2017   18:41   T:  10/15/2017   22:55   Job #:  [id number]

## 2017-10-16 NOTE — PROGRESS NOTES
Patient passed bedside swallow. No coughing noted. Patient took morning medications with apple sauce without difficulty.

## 2017-10-16 NOTE — PROGRESS NOTES
NP Blane Nguyễn,  Stated to hold off on patient medications until speech clears up. Patient's speech is currently slurred. Bed side swallow test to be conducted before medications are to be given.

## 2017-10-16 NOTE — PROGRESS NOTES
Problem: Mobility Impaired (Adult and Pediatric)  Goal: *Acute Goals and Plan of Care (Insert Text)  PHYSICAL THERAPY NEURO EVALUATION/DISCHARGE      Patient: Mika Iyer (37 y.o. female)  Date: 10/16/2017  Primary Diagnosis: TIA (transient ischemic attack)  TIA (transient ischemic attack)  TIA (transient ischemic attack)  TIA (transient ischemic attack)        Precautions:          ASSESSMENT :  Based on the objective data described below, the patient presents with good strength, coordination, and stability with reported return to baseline functional level (no aphasia or dysarthria during session) following admission for TIA. MRI workup has been negative for acute process. PTA, pt reports that she lived at home alone and was indep with all ADLs and mobility w/o use of AD. She was able to complete all mobility at an overall Indep/SUP level including ambulating x400ft around unit w/o obvious deviation, difficulty, or LOB. No focal neuro deficit appreciated during evaluation. Liang score of 48/56 indicates low falls risk. Anticipate that pt will be appropriate for discharge home with no further therapy interventions. Will complete PT order and sign off. Thank you     Skilled physical therapy is not indicated at this time.        PLAN :  Discharge Recommendations: None  Further Equipment Recommendations for Discharge: none          SUBJECTIVE:   Patient stated I'm back to normal.      OBJECTIVE DATA SUMMARY:   HISTORY:    Past Medical History:   Diagnosis Date    Cancer ([facility] [facility]       cervical cancer    Cancer ([facility] [facility]       skin cancer    Ill-defined condition       lyme disease     Past Surgical History:   Procedure Laterality Date    HX GI         isaias colectomy    HX GYN         partial hysterectomy    HX GYN         c section    HX ORTHOPAEDIC         RIGHT shoulder surgery    HX ORTHOPAEDIC         RIGHT foot surgery    NEUROLOGICAL PROCEDURE UNLISTED         lumber surgery     Prior Level of Function/Home Situation: Lives at home alone, [facility] with 4 steps to enter. Indep with ADLs and mobility w/o use of AD. Has support of friends and family. Personal factors and/or comorbidities impacting plan of care:      Home Situation  Home Environment: Private residence  # Steps to Enter: 4  Rails to Enter: Yes  Hand Rails : Right  One/Two Story Residence: One story  Living Alone: Yes  Support Systems: Child(randolph), Family member(s)  Patient Expects to be Discharged to[de-identified] Private residence  Current DME Used/Available at Home: None  Tub or Shower Type: Tub/Shower combination     EXAMINATION/PRESENTATION/DECISION MAKING:   Critical Behavior:  Neurologic State: Alert, Appropriate for age  Orientation Level: Oriented X4  Cognition: Follows commands, Appropriate safety awareness, Appropriate for age attention/concentration, Appropriate decision making  Safety/Judgement: Awareness of environment, Fall prevention, Good awareness of safety precautions, Home safety, Insight into deficits  Hearing: Auditory  Auditory Impairment: None  Skin:  Intact where exposed  Edema: none noted  Range Of Motion:  AROM: Within functional limits  PROM: Within functional limits     Strength:    Strength:  Within functional limits     Tone & Sensation:   Tone: Normal   Sensation: Intact        Coordination:  Coordination: Within functional limits  Vision:   Tracking: Able to track stimulus in all quadrants w/o difficulty  Diplopia: No  Corrective Lenses: Glasses  Functional Mobility:  Bed Mobility:     Supine to Sit: Independent  Sit to Supine: Independent     Transfers:  Sit to Stand: Independent  Stand to Sit: Independent     Balance:   Sitting: Intact  Standing: Intact  Ambulation/Gait Training:  Distance (ft): 400 Feet (ft)  Assistive Device: Gait belt  Ambulation - Level of Assistance: Supervision  Gait Description (WDL): Exceptions to WDL        Functional Measure:  Liang Balance Test:      Sitting to Standin  Standing Unsupported: 4  Sitting with Back Unsupported: 4  Standing to Sittin  Transfers: 4  Standing Unsupported with Eyes Closed: 3  Standing Unsupported with Feet Together: 3  Reach Forward with Outstretched Arm: 4   Object: 4  Turn to Look Over Shoulders: 3  Turn 360 Degrees: 4  Alternate Foot on Step/Stool: 4  Standing Unsupported One Foot in Front: 2  Stand on One Le  Total: 48             56=Maximum possible score;   0-20=High fall risk  21-40=Moderate fall risk   41-56=Low fall risk      Liang Balance Test and G-code impairment scale:  Percentage of Impairment CH     0%    CI     1-19% CJ     20-39% CK     40-59% CL     60-79% CM     80-99% CN      100%   Liang   Score 0-56 56 45-55 34-44 23-33 12-22 1-11 0      G codes: In compliance with CMSs Claims Based Outcome Reporting, the following G-code set was chosen for this patient based on their primary functional limitation being treated: The outcome measure chosen to determine the severity of the functional limitation was the Jimenez with a score of 48/56 which was correlated with the impairment scale.       · Mobility - Walking and Moving Around:               - CURRENT STATUS:    CI - 1%-19% impaired, limited or restricted               - GOAL STATUS:           CI - 1%-19% impaired, limited or restricted               - D/C STATUS:                       CI - 1%-19% impaired, limited or restricted      Physical Therapy Evaluation Charge Determination   History Examination Presentation Decision-Making   MEDIUM  Complexity : 1-2 comorbidities / personal factors will impact the outcome/ POC  LOW Complexity : 1-2 Standardized tests and measures addressing body structure, function, activity limitation and / or participation in recreation  LOW Complexity : Stable, uncomplicated  LOW Complexity : FOTO score of       Based on the above components, the patient evaluation is determined to be of the following complexity level: LOW      Pain:  Pain Scale 1: Numeric (0 - 10)  Pain Intensity 1: 0     Activity Tolerance:   VSS  Please refer to the flowsheet for vital signs taken during this treatment. After treatment:   [ ]         Patient left in no apparent distress sitting up in chair  [X]         Patient left in no apparent distress in bed  [X]         Call bell left within reach  [X]         Nursing notified  [ ]         Caregiver present  [ ]         Bed alarm activated      COMMUNICATION/EDUCATION:   Patient was educated regarding Her deficit(s) of slurred speech as this relates to Her diagnosis of TIA. She demonstrated Good understanding as evidenced by no further questions. Patient and/or family was verbally educated on the BE FAST acronym for signs/symptoms of CVA and TIA. All questions answered with patient indicating good understanding.      [X]   Fall prevention education was provided and the patient/caregiver indicated understanding. [X]   Patient/family have participated as able and agree with findings and recommendations. [ ]   Patient is unable to participate in plan of care at this time.      Findings and recommendations were discussed with: Registered Nurse     Thank you for this referral.  Norma eMdley, PT, DPT   Time Calculation: 15 mins

## 2017-10-16 NOTE — PROGRESS NOTES
Problem: Falls - Risk of  Goal: *Absence of Falls  Document Yao Fall Risk and appropriate interventions in the flowsheet.    Outcome: Progressing Towards Goal  Fall Risk Interventions:  Mobility Interventions: Patient to call before getting OOB           Medication Interventions: Patient to call before getting OOB, Teach patient to arise slowly           History of Falls Interventions: Door open when patient unattended

## 2017-10-16 NOTE — PROGRESS NOTES
Patient currently has a gum disease and currently been treated. Patient can not remember the name of the gum disease. Patient being treated monthly for removal of plaque however canceled this month.

## 2017-10-17 ENCOUNTER — APPOINTMENT (OUTPATIENT)
Dept: CT IMAGING | Age: 62
DRG: 069 | End: 2017-10-17
Attending: INTERNAL MEDICINE
Payer: SELF-PAY

## 2017-10-17 LAB
BASOPHILS # BLD: 0 K/UL (ref 0–0.1)
BASOPHILS NFR BLD: 0 % (ref 0–1)
EOSINOPHIL # BLD: 0.2 K/UL (ref 0–0.4)
EOSINOPHIL NFR BLD: 3 % (ref 0–7)
EPO SERPL-ACNC: 4.3 MIU/ML (ref 2.6–18.5)
ERYTHROCYTE [DISTWIDTH] IN BLOOD BY AUTOMATED COUNT: 12.6 % (ref 11.5–14.5)
HCT VFR BLD AUTO: 47.2 % (ref 35–47)
HGB BLD-MCNC: 16.1 G/DL (ref 11.5–16)
LYMPHOCYTES # BLD: 2.4 K/UL (ref 0.8–3.5)
LYMPHOCYTES NFR BLD: 31 % (ref 12–49)
MCH RBC QN AUTO: 33.5 PG (ref 26–34)
MCHC RBC AUTO-ENTMCNC: 34.1 G/DL (ref 30–36.5)
MCV RBC AUTO: 98.1 FL (ref 80–99)
MONOCYTES # BLD: 0.5 K/UL (ref 0–1)
MONOCYTES NFR BLD: 7 % (ref 5–13)
NEUTS SEG # BLD: 4.5 K/UL (ref 1.8–8)
NEUTS SEG NFR BLD: 59 % (ref 32–75)
PLATELET # BLD AUTO: 217 K/UL (ref 150–400)
RBC # BLD AUTO: 4.81 M/UL (ref 3.8–5.2)
WBC # BLD AUTO: 7.6 K/UL (ref 3.6–11)

## 2017-10-17 PROCEDURE — 36415 COLL VENOUS BLD VENIPUNCTURE: CPT | Performed by: NURSE PRACTITIONER

## 2017-10-17 PROCEDURE — 74011250637 HC RX REV CODE- 250/637: Performed by: INTERNAL MEDICINE

## 2017-10-17 PROCEDURE — 85025 COMPLETE CBC W/AUTO DIFF WBC: CPT | Performed by: NURSE PRACTITIONER

## 2017-10-17 PROCEDURE — 74011250636 HC RX REV CODE- 250/636: Performed by: INTERNAL MEDICINE

## 2017-10-17 PROCEDURE — 74176 CT ABD & PELVIS W/O CONTRAST: CPT

## 2017-10-17 PROCEDURE — 65660000000 HC RM CCU STEPDOWN

## 2017-10-17 RX ORDER — ATORVASTATIN CALCIUM 20 MG/1
20 TABLET, FILM COATED ORAL
Status: DISCONTINUED | OUTPATIENT
Start: 2017-10-17 | End: 2017-10-18 | Stop reason: HOSPADM

## 2017-10-17 RX ADMIN — ATORVASTATIN CALCIUM 20 MG: 20 TABLET, FILM COATED ORAL at 21:56

## 2017-10-17 RX ADMIN — ALPRAZOLAM 0.25 MG: 0.5 TABLET ORAL at 13:20

## 2017-10-17 RX ADMIN — Medication 10 ML: at 14:19

## 2017-10-17 RX ADMIN — ENOXAPARIN SODIUM 40 MG: 40 INJECTION SUBCUTANEOUS at 18:08

## 2017-10-17 RX ADMIN — Medication 10 ML: at 06:00

## 2017-10-17 RX ADMIN — ASPIRIN 81 MG 81 MG: 81 TABLET ORAL at 08:40

## 2017-10-17 RX ADMIN — Medication 10 ML: at 22:00

## 2017-10-17 RX ADMIN — ALPRAZOLAM 0.25 MG: 0.5 TABLET ORAL at 18:16

## 2017-10-17 RX ADMIN — SODIUM CHLORIDE 100 ML/HR: 900 INJECTION, SOLUTION INTRAVENOUS at 23:05

## 2017-10-17 RX ADMIN — SODIUM CHLORIDE 100 ML/HR: 900 INJECTION, SOLUTION INTRAVENOUS at 11:02

## 2017-10-17 NOTE — PROGRESS NOTES
Hospitalist Progress Note  Derek Mancuso MD  Office: [phone]      Date of Service:  10/17/2017  NAME:  Bobby Vigil  :  1955  MRN:  [id number]      Admission Summary:   60year old female with baseline Hgb of 18.5 presented to the hospital with a chief complaint of dizziness, vertigo, and slurring of speech. Admitted with a working dx of polycythemia and possible TIA.      Interval history / Subjective:   Seen at the bed side:lizbeth has multiple complaints of generalized body pain, with \"episodes of slurring of speech\", on my examination she looks perfectly healthy     Visit Vitals    /76 (BP 1 Location: Right arm, BP Patient Position: At rest)    Pulse 87    Temp 98 °F (36.7 °C)    Resp 20    Ht 5' 1\" (1.549 m)    Wt 58.1 kg (128 lb)    SpO2 98%    BMI 24.19 kg/m2           Assessment & Plan:     Possible TIA:   - patient presented with symptoms consistent with TIA   - MRI/MRA negative for acute stroke   - Lipid profile and Hgb A1C reviewed   - No speech disorder today   - neurology following  - continue with aspirin  - will start Lipitor 20 mg oral per day    - smoking cessation counseling with patient   -Case discussed with Neurology      Right Buttock Abscess:   - almost healed     Polycythemia (POA) :   - hgb down to 16.1 this AM  - heme/onc consulted and recommend phlebotomy of 1 unit - completed yesterday   - JAZZY, LD and erythropoietin: Pending   - needs outpatient follow up   - continue with aspirin and statins    Right lower quadrant abdominal pain   -ordered CT scan of the abdomen: showing non obstructive bilateral nephrolithiasis      Code status: Full  DVT prophylaxis: Lovenox    Care Plan discussed with: Patient/Family, Nurse and   Disposition: Home w/Family     Hospital Problems  Date Reviewed: 10/15/2017          Codes Class Noted POA    * (Principal)TIA (transient ischemic attack) ICD-10-CM: G45.9  ICD-9-CM: 435.9  10/14/2017 Yes                Review of Systems:   A comprehensive review of systems was negative except for that written in the HPI. Vital Signs:    Last 24hrs VS reviewed since prior progress note. Most recent are:  Visit Vitals    /76 (BP 1 Location: Right arm, BP Patient Position: At rest)    Pulse 87    Temp 98 °F (36.7 °C)    Resp 20    Ht 5' 1\" (1.549 m)    Wt 58.1 kg (128 lb)    SpO2 98%    BMI 24.19 kg/m2       No intake or output data in the 24 hours ending 10/17/17 1926     Physical Examination:             Constitutional:  No acute distress   ENT:  Oral mucous moist, oropharynx benign. Neck supple,    Resp:  CTA bilaterally. No wheezing/rhonchi/rales. No accessory muscle use   CV:  Regular rhythm, normal rate, no murmurs, gallops, rubs    GI:  Soft, non distended, non tender. normoactive bowel sounds, no hepatosplenomegaly     Musculoskeletal:  No edema, warm, 2+ pulses throughout    Neurologic:  Moves all extremities. AAOx3, CN II-XII reviewed           Data Review:    Review and/or order of clinical lab test      Labs:     Recent Labs      10/17/17   1750  10/15/17   1424   WBC  7.6  6.1   HGB  16.1*  16.3*   HCT  47.2*  47.6*   PLT  217  212     Recent Labs      10/15/17   1424  10/15/17   0555   NA  142  147*   K  3.8  3.3*   CL  110*  117*   CO2  25  21   BUN  12  12   CREA  0.75  0.67   GLU  119*  81   CA  8.7  7.0*     No results for input(s): SGOT, GPT, ALT, AP, TBIL, TBILI, TP, ALB, GLOB, GGT, AML, LPSE in the last 72 hours. No lab exists for component: AMYP, HLPSE  Recent Labs      10/15/17   1424   INR  1.0   PTP  10.4      No results for input(s): FE, TIBC, PSAT, FERR in the last 72 hours. No results found for: FOL, RBCF   No results for input(s): PH, PCO2, PO2 in the last 72 hours. No results for input(s): CPK, CKNDX, TROIQ in the last 72 hours.     No lab exists for component: CPKMB  Lab Results   Component Value Date/Time    Cholesterol, total 193 10/15/2017 02:24 PM    HDL Cholesterol 51 10/15/2017 02:24 PM    LDL, calculated 104.6 10/15/2017 02:24 PM    Triglyceride 187 10/15/2017 02:24 PM    CHOL/HDL Ratio 3.8 10/15/2017 02:24 PM     Lab Results   Component Value Date/Time    Glucose (POC) 93 10/16/2017 09:19 AM    Glucose (POC) 116 10/15/2017 02:12 PM    Glucose (POC) 141 10/14/2017 01:14 PM     No results found for: COLOR, APPRN, SPGRU, REFSG, JESSE, PROTU, GLUCU, KETU, BILU, UROU, STEVIE, LEUKU, GLUKE, EPSU, BACTU, WBCU, RBCU, CASTS, UCRY      Medications Reviewed:     Current Facility-Administered Medications   Medication Dose Route Frequency    atorvastatin (LIPITOR) tablet 20 mg  20 mg Oral QHS    ondansetron (ZOFRAN) injection 4 mg  4 mg IntraVENous Q6H PRN    sodium chloride (NS) flush 5 mL  5 mL IntraVENous PRN    albuterol-ipratropium (DUO-NEB) 2.5 MG-0.5 MG/3 ML  3 mL Nebulization Q6H PRN    sodium chloride (NS) flush 5-10 mL  5-10 mL IntraVENous Q8H    sodium chloride (NS) flush 5-10 mL  5-10 mL IntraVENous PRN    enoxaparin (LOVENOX) injection 40 mg  40 mg SubCUTAneous Q24H    aspirin chewable tablet 81 mg  81 mg Oral DAILY    0.9% sodium chloride infusion  100 mL/hr IntraVENous CONTINUOUS    ALPRAZolam (XANAX) tablet 0.25 mg  0.25 mg Oral QID PRN    calcium carbonate (TUMS) chewable tablet 200 mg [elemental]  200 mg Oral QID PRN    influenza vaccine 2017-18 (3 yrs+)(PF) (FLUZONE QUAD/FLUARIX QUAD) injection 0.5 mL  0.5 mL IntraMUSCular PRIOR TO DISCHARGE     ______________________________________________________________________  EXPECTED LENGTH OF STAY: 2d 2h  ACTUAL LENGTH OF STAY:          3                 Devyn Piper MD

## 2017-10-17 NOTE — PROGRESS NOTES
Problem: Falls - Risk of  Goal: *Absence of Falls  Document Yao Fall Risk and appropriate interventions in the flowsheet.    Outcome: Progressing Towards Goal  Fall Risk Interventions:  Mobility Interventions: Communicate number of staff needed for ambulation/transfer, Patient to call before getting OOB, PT Consult for mobility concerns           Medication Interventions: Evaluate medications/consider consulting pharmacy, Patient to call before getting OOB           History of Falls Interventions: Door open when patient unattended

## 2017-10-17 NOTE — PROGRESS NOTES
Brief Hematology Note:    Chart reviewed. CBC ordered overnight and not obtained. Reordered for today. Last HgB on 10/15/17 prior to phlebotomy. JAK2 with reflex to CALR, MPL obtained yesterday and pending. She has follow up scheduled with Dr. Mckinney [id number], our partner at [facility], on 10/30/17. Will follow peripherally while here. Stable from Hematology standpoint for discharge. Should go home on aspirin 81mg.     Please call with any questions,    Sofia Schroeder NP

## 2017-10-17 NOTE — ROUTINE PROCESS
Bedside shift change report given to [facility], RN (oncoming nurse) by CHANCE Livingston (offgoing nurse). Report included the following information SBAR, ED Summary, OR Summary, Procedure Summary, MAR, Recent Results and Cardiac Rhythm NSR.

## 2017-10-17 NOTE — PROGRESS NOTES
MALLORY met with pt to introduce her to the role of Cm and transition of care. She verbalized understanding. This pt lives in [place], but will be staying locally with a son for a short time. She was independent prior to admission and there were no follow up recommendations from therapists. This pt confirmed that she does not have insurance. She stated that she is in a program through [facility] that allows her to be financially screened and seen by  physicians at a reduced fee. She wants to continue using these medical services. Mallory did give her the Care Card application if she decides to stay in [facility]. No other needs identified.  Rock Masters

## 2017-10-17 NOTE — ROUTINE PROCESS
Bedside shift change report given to Ewelina (oncoming nurse) by Ricarda Fortune (offgoing nurse). Report included the following information SBAR, ED Summary, Intake/Output, MAR, Recent Results and Cardiac Rhythm NSR.

## 2017-10-17 NOTE — PROGRESS NOTES
NUTRITION COMPLETE ASSESSMENT    RECOMMENDATIONS:   1. Document % meals consumed in flowsheet  2. Weekly weights     Interventions/Plan:   Food/Nutrient Delivery:   (snacks)            Assessment:   Reason for Assessment: [x]BPA/MST Referral     Diet: Regular  Supplements: none  Nutritionally Significant Medications: [x] Reviewed & Includes: lipitor, ASA, NS @ 100ml/hr; PRN: tums, zofran   Meal Intake:   Patient Vitals for the past 100 hrs:   % Diet Eaten   10/17/17 0800 80 %   10/14/17 1900 25 %     Pre-Hospitalization:  Usual Appetite: Good  Diet at Home: regular  Vitamins/Supplements: No    Current Hospitalization:   Appetite: Good  PO Ability: Independent Average po intake:%  Average supplements intake:        Subjective: \"I lost 8# but I have been eating good. \"    Objective:  Pt admitted for TIA. PMHx: Lyme dx, cervical CA, isaias-colectomy d/t injury during colonoscopy. Negative stroke work up. Slurred speech noted but though to be more stress-related per MD note. Followed by hematology for polycythemia - will follow-up as outpatient. Nausea and diarrhea for past 2 weeks per hematology note with 8# wt loss. No wt hx available. No diarrhea noted in chart. Last BM on 10/14. Pt visited today. 90% of breakfast consumed. Pt reports good appetite at home PTA. More of a grazer since she can't each much at one time. Does not like Ensure/Boost but excited about snacks between meals. Cleared for d/c from neurology and hematology standpoint. Will continue to follow for PO intake, snack consumption and wt trends. Estimated Nutrition Needs:   Kcals/day: 1280 Kcals/day (1280-1385kcal)  Protein: 46 g (46-58g (0.8-1g/kg))  Fluid: 1500 ml  Based On: [place] Jeor (x 1.2-1.3)  Weight Used: Actual wt (58.1kg)    Pt expected to meet estimated nutrient needs:  [x]   Yes     []  No [] Unable to predict at this time  Nutrition Diagnosis:   1.  Unintended weight loss related to ?altered GI fx as evidenced by pt reported 8# wt loss and N/D x 2 weeks PTA     Goals:     Continued consumption of at least 75% of meals in 5-7 days     Monitoring & Evaluation:    - Total energy intake   - Weight/weight change, GI    Previous Nutrition Goals Met:   N/A  Previous Recommendations:    N/A    Education & Discharge Needs:   [x] None Identified   [] Identified and addressed    [] Participated in care plan, discharge planning, and/or interdisciplinary rounds        Cultural, Jain and ethnic food preferences identified: None    Skin Integrity: [x]Intact  []Other  Edema: [x]None []Other  Last BM: 10/14  Food Allergies: [x]None []Other  Diet Restrictions: Cultural/Presybeterian Preference(s): None     Anthropometrics:    Weight Loss Metrics 10/17/2017 10/14/2017   Today's Wt 128 lb -   BMI - 24.19 kg/m2      Weight Source: Bed  Height: 5' 1\" (154.9 cm),    Body mass index is 24.19 kg/(m^2).   IBW : 47.6 kg (105 lb), % IBW (Calculated): 121.9 %  Usual Body Weight: 61.7 kg (136 lb),      Labs:    Lab Results   Component Value Date/Time    Sodium 142 10/15/2017 02:24 PM    Potassium 3.8 10/15/2017 02:24 PM    Chloride 110 10/15/2017 02:24 PM    CO2 25 10/15/2017 02:24 PM    Glucose 119 10/15/2017 02:24 PM    BUN 12 10/15/2017 02:24 PM    Creatinine 0.75 10/15/2017 02:24 PM    Calcium 8.7 10/15/2017 02:24 PM    Albumin 3.8 10/14/2017 01:32 PM     Lab Results   Component Value Date/Time    Hemoglobin A1c 5.5 10/15/2017 02:24 PM     Peg Lau RD

## 2017-10-18 VITALS
DIASTOLIC BLOOD PRESSURE: 69 MMHG | WEIGHT: 128.09 LBS | RESPIRATION RATE: 19 BRPM | HEIGHT: 61 IN | BODY MASS INDEX: 24.18 KG/M2 | TEMPERATURE: 97.7 F | OXYGEN SATURATION: 100 % | HEART RATE: 93 BPM | SYSTOLIC BLOOD PRESSURE: 108 MMHG

## 2017-10-18 PROCEDURE — 74011250637 HC RX REV CODE- 250/637: Performed by: INTERNAL MEDICINE

## 2017-10-18 PROCEDURE — 92507 TX SP LANG VOICE COMM INDIV: CPT | Performed by: SPEECH-LANGUAGE PATHOLOGIST

## 2017-10-18 RX ORDER — GUAIFENESIN 100 MG/5ML
81 LIQUID (ML) ORAL DAILY
Qty: 30 TAB | Refills: 0 | Status: SHIPPED | OUTPATIENT
Start: 2017-10-19

## 2017-10-18 RX ORDER — CALCIUM CARBONATE 200(500)MG
200 TABLET,CHEWABLE ORAL
Qty: 30 TAB | Refills: 0 | Status: SHIPPED | OUTPATIENT
Start: 2017-10-18 | End: 2021-05-26

## 2017-10-18 RX ORDER — ATORVASTATIN CALCIUM 20 MG/1
20 TABLET, FILM COATED ORAL
Qty: 30 TAB | Refills: 0 | Status: SHIPPED | OUTPATIENT
Start: 2017-10-18 | End: 2021-05-26

## 2017-10-18 RX ADMIN — ALPRAZOLAM 0.25 MG: 0.5 TABLET ORAL at 12:27

## 2017-10-18 RX ADMIN — ALPRAZOLAM 0.25 MG: 0.5 TABLET ORAL at 05:56

## 2017-10-18 RX ADMIN — ASPIRIN 81 MG 81 MG: 81 TABLET ORAL at 09:00

## 2017-10-18 RX ADMIN — Medication 10 ML: at 05:52

## 2017-10-18 NOTE — DISCHARGE INSTRUCTIONS
Discharge Instructions       PATIENT ID: Mine Arreola  MRN: [id number]   YOB: 1955    DATE OF ADMISSION: 10/14/2017  1:12 PM    DATE OF DISCHARGE: 10/18/2017    PRIMARY CARE PROVIDER: Shahbaz Guaman MD     ATTENDING PHYSICIAN: Nicola Perkins MD  DISCHARGING PROVIDER: Nicola Perkins MD    To contact this individual call [phone] and ask the  to page. If unavailable ask to be transferred the Adult Hospitalist Department. DISCHARGE DIAGNOSES: TIA   ? Polycythemia      CONSULTATIONS: IP CONSULT TO HOSPITALIST  IP CONSULT TO NEUROLOGY  IP CONSULT TO HEMATOLOGY  IP CONSULT TO PSYCHIATRY    PROCEDURES/SURGERIES: * No surgery found *    PENDING TEST RESULTS:   At the time of discharge the following test results are still pending: None   FOLLOW UP APPOINTMENTS:   Follow-up Information     Follow up With Details Comments Contact Info    Phylliss Spurling, MD Go on 10/30/2017 Appt 10/30/17 at 1pm. Please arrive about 15 minutes prior to scheduled appointment. [address] [place] [id number]  [address]  [phone]             ADDITIONAL CARE RECOMMENDATIONS: Strongly advised to quit smoking     DIET: Cardiac Diet    ACTIVITY: Activity as tolerated      DISCHARGE MEDICATIONS:   See Medication Reconciliation Form    · It is important that you take the medication exactly as they are prescribed. · Keep your medication in the bottles provided by the pharmacist and keep a list of the medication names, dosages, and times to be taken in your wallet. · Do not take other medications without consulting your doctor. NOTIFY YOUR PHYSICIAN FOR ANY OF THE FOLLOWING:   Fever over 101 degrees for 24 hours. Chest pain, shortness of breath, fever, chills, nausea, vomiting, diarrhea, change in mentation, falling, weakness, bleeding. Severe pain or pain not relieved by medications.   Or, any other signs or symptoms that you may have questions about. DISPOSITION:  X  Home With:   OT  PT  HH  RN       SNF/Inpatient Rehab/LTAC    Independent/assisted living    Hospice    Other:     CDMP Checked: Yes X     PROBLEM LIST Updated:   Yes X       Signed:   Jose Luis Beck MD  10/18/2017  1:59 PM

## 2017-10-18 NOTE — PROGRESS NOTES
Problem: Falls - Risk of  Goal: *Absence of Falls  Document Yao Fall Risk and appropriate interventions in the flowsheet.    Outcome: Progressing Towards Goal  Fall Risk Interventions:  Mobility Interventions: Communicate number of staff needed for ambulation/transfer, Patient to call before getting OOB         Medication Interventions: Evaluate medications/consider consulting pharmacy, Patient to call before getting OOB, Teach patient to arise slowly         History of Falls Interventions: Evaluate medications/consider consulting pharmacy, Door open when patient unattended

## 2017-10-18 NOTE — PROGRESS NOTES
Problem: Motor Speech Impaired (Adult)  Goal: *Acute Goals and Plan of Care (Insert Text)  Speech therapy goals  Initiated 10/16/2017   1. Patient will complete conversational level speech tasks without misarticulations with 90% accuracy with min cues within 7 days MET 10/18/2017   2. Patient will complete tongue twister tasks without misarticulations with 90% accuracy with min cues within 7 days MET 10/18/2017     Speech language pathology treatment/discharge  Patient: Heidi Brenner (89 y.o. female)  Date: 10/18/2017  Diagnosis: TIA (transient ischemic attack)  TIA (transient ischemic attack)  TIA (transient ischemic attack)  TIA (transient ischemic attack) TIA (transient ischemic attack)       Precautions:       ASSESSMENT:  Patient with fluent conversational speech upon SLP entrance, and reports no problems with speech unless she's having an \"episode\". Educated extensively on compensatory strategies for use during periods of speech difficulty including: over articulating words, speaking loudly, speaking slowly, and looking at the listener. Patient showed limited interest in utilizing compensatory strategies, and expressed frustration with hospitalization and desire to be discharged. Patient with fluent speech outside of her \"episodes\" and is otherwise fluent in conversation. She has been educated on compensatory strategies and has no further SLP needs at this time. Progression toward goals:  [x]         Improving appropriately and progressing toward goals  []         Improving slowly and progressing toward goals  []         Not making progress toward goals and plan of care will be adjusted     PLAN:  Patient will be discharged from speech therapy at this time.   Rationale for discharge:  [x]    Goals Achieved  []    [address]  []    Patient not participating in therapy  []    Other:  Discharge Recommendations:  None     SUBJECTIVE:   Patient stated Once I get this figured out I won't have speech problems  referring to her medical conditions while discussing compensatory strategies. Patient was alert and conversational. Theta Crooked with therapist for discussing speech strategies. OBJECTIVE:   Mental Status:  Neurologic State: Alert  Orientation Level: Oriented X4  Cognition: Appropriate for age attention/concentration, Follows commands  Perception: Appears intact  Perseveration: No perseveration noted  Safety/Judgement: Awareness of environment, Fall prevention, Good awareness of safety precautions, Home safety, Insight into deficits  Treatment & Interventions: Motor Speech:               See assessment section for patient education. Introduced to compensatory speech strategies. G Codes: In compliance with CMSs Claims Based Outcome Reporting, the following G-code set was chosen for this patient based the use of the NOMS functional outcome to quantify this patient's level of motor speech impairment. Using the NOMS, the patient was determined to be at level 7 for motor speech which correlates with the CH= 0% level of severity. Based on the objective assessment provided within this note, the current, goal, and discharge g-codes are as follows: Motor   Goal  CI= 1-19%   D/C  CH= 0%    NOMS:     NOMS Motor Speech:  Level 1 (CN):  100% unintelligible  Level 2 (CM):  Communication partner responsible for message; can do CV or automatic words w/ max cues but rarely intelligible in context  Level 3 (CL): communication partner primarily responsible for message but says CV/automatic words intelligibly; mod cues to say simple words/phrases  Level 4 (CK): In structured conversation with familiar listener can say simple words and phrases.   Mod cues for simple sentences  Level 5 (CJ):  Uses simple sentences for ADLs with familiar and unfamiliar listener; min cues for complex sentences  Level 6 (CI):  Intelligible in ADLs; difficulty in voc/social activites; rare cues for complex message; uses comp strategies  Level 7 ([address] [place] [address]):  Intelligible in all activities. May occasionally use compensatory strategies. SAHRA. (2003). National Outcomes Measurement System (NOMS): Adult Speech-Language Pathology User's Guide. Response & Tolerance to Activities:     Pain:  Pain Scale 1: Numeric (0 - 10)  Pain Intensity 1: 0     After treatment:   []    Patient left in no apparent distress sitting up in chair  [x]    Patient left in no apparent distress in bed  [x]    Call bell left within reach  [x]    Nursing notified  [x]    Caregiver present  []    Bed alarm activated    COMMUNICATION/COLLABORATION:   Patient was educated regarding Her functional motor speech as this relates to Her CVA work up. She demonstrated Fair understanding as evidenced by verbalized understanding.     The patients plan of care was discussed with: Registered Nurse    Maryellen Collins Student SLP   Time Calculation: 21 mins

## 2017-10-18 NOTE — ROUTINE PROCESS
Bedside shift change report given to Claudia Fernández (oncoming nurse) by Kwesi Frank (offgoing nurse). Report included the following information SBAR, ED Summary, Intake/Output, MAR, Recent Results and Cardiac Rhythm NSR.

## 2017-10-18 NOTE — PROGRESS NOTES
Problem: Falls - Risk of  Goal: *Absence of Falls  Document Yao Fall Risk and appropriate interventions in the flowsheet.    Outcome: Progressing Towards Goal  Fall Risk Interventions:  Mobility Interventions: Communicate number of staff needed for ambulation/transfer, Patient to call before getting OOB           Medication Interventions: Evaluate medications/consider consulting pharmacy, Patient to call before getting OOB           History of Falls Interventions: Door open when patient unattended

## 2017-10-18 NOTE — DISCHARGE SUMMARY
Discharge Summary       PATIENT ID: Harlan Simons  MRN: [id number]   YOB: 1955    DATE OF ADMISSION: 10/14/2017  1:12 PM    DATE OF DISCHARGE: 10/18/2017  PRIMARY CARE PROVIDER: Ray Batres MD     ATTENDING PHYSICIAN: Mara Lux  DISCHARGING PROVIDER: Dario Caldera MD    To contact this individual call [phone] and ask the  to page. If unavailable ask to be transferred the Adult Hospitalist Department. CONSULTATIONS: IP CONSULT TO HOSPITALIST  IP CONSULT TO NEUROLOGY  IP CONSULT TO HEMATOLOGY  IP CONSULT TO PSYCHIATRY    PROCEDURES/SURGERIES: * No surgery found *    ADMITTING DIAGNOSES & HOSPITAL COURSE: As per documentation by admitting physician   The patient is a 50-year-old female with a past medical history of elevated hemoglobin, the baseline around 18.5, currently undergoing workup for possible lung and renal cancer. The patient presents with a 4 hour history of dizziness, vertigo, slurring of the speech and word-finding difficulty. As per the patient, she was in the shower and started noticing some numbness in the tip of the fingers, which was followed by some episodes of feeling vertigo. The patient was very alert and awake, was aware of the surroundings, came out of the shower, then the family had started noticing that she was having some slurring of the speech and sometimes having difficulty finding words. The patient, all this time, was alert and very much aware of the surroundings, did not notice any jerking movements in the arms or legs, did not have any bladder or bowel incontinence. Currently, apparently, the symptoms are completely resolved. The patient was brought to the ER. CT scan was done, and the decision to give the patient thrombolytics, tPA, was taken. However, the patient refused and an MRI scan was done. MRA and MRI scan of the neck was done, which did not show any obstruction. The patient's symptoms currently have resolved completely.  Denied complaints of any chest pain. Admitted with a working dx of TIA to R/O CVA. DISCHARGE DIAGNOSES / PLAN:      Possible TIA:   Patient presented with symptoms consistent with TIA   MRI/MRA negative for acute stroke, all imaging studies came back negative for stroke   Neurology consultation called: with a recommendation to start on ASA, and follow up as an outpatient. Ok to discharge from their point of view. Started on Lipitor while in the hospital her Lipid profile and Hgb A1C reviewed. Patient general condition improved while in the hospital. Agreeable with discharge planning and advised to have a follow up with PMD as an outpatient in one week. Strongly advised to quit smoking.       Right Buttock Abscess:   Healed       Polycythemia (POA) :   Hematology followed while int the hospital one unit of PRBC (phlebotomy) removed, which brought her Hgb to 16.1 advised to have a follow up as an outpatient, epoetin within normal limits. Blood work for [facility]: Pending. Patient will be discharged with ASA and statins and advised to have a follow up with PMD and follow up with a hematology as an outpatient.      Right lower quadrant abdominal pain   Ordered CT scan of the abdomen: showing non obstructive bilateral nephrolithiasis             PENDING TEST RESULTS:   At the time of discharge the following test results are still pending: None     FOLLOW UP APPOINTMENTS:    Follow-up Information     Follow up With Details Comments Contact Info    Skyla Montoya MD Go on 10/30/2017 Appt 10/30/17 at 1pm. Please arrive about 15 minutes prior to scheduled appointment.  [address] [place] [address]. [phone]  [address], [address] [place] [address],[address] [place] [address]  [phone]             ADDITIONAL CARE RECOMMENDATIONS: none    DIET: Cardiac Diet    ACTIVITY: Activity as tolerated    WOUND CARE: follow up with PMD       DISCHARGE MEDICATIONS:  Current Discharge Medication List      START taking these medications    Details   calcium carbonate (TUMS) 200 mg calcium (500 mg) chew Take 1 Tab by mouth four (4) times daily as needed. Qty: 30 Tab, Refills: 0      atorvastatin (LIPITOR) 20 mg tablet Take 1 Tab by mouth nightly. Qty: 30 Tab, Refills: 0      aspirin 81 mg chewable tablet Take 1 Tab by mouth daily. Qty: 30 Tab, Refills: 0         CONTINUE these medications which have NOT CHANGED    Details   ergocalciferol (VITAMIN D2) 50,000 unit capsule Take 50,000 Units by mouth. ipratropium-albuterol (COMBIVENT RESPIMAT)  mcg/actuation inhaler Take 1 Puff by inhalation every six (6) hours. STOP taking these medications       clindamycin (CLEOCIN) 300 mg capsule Comments:   Reason for Stopping:         naproxen (NAPROSYN) 500 mg tablet Comments:   Reason for Stopping:                 NOTIFY YOUR PHYSICIAN FOR ANY OF THE FOLLOWING:   Fever over 101 degrees for 24 hours. Chest pain, shortness of breath, fever, chills, nausea, vomiting, diarrhea, change in mentation, falling, weakness, bleeding. Severe pain or pain not relieved by medications. Or, any other signs or symptoms that you may have questions about. DISPOSITION:  X  Home With:   OT  PT  HH  RN       Long term SNF/Inpatient Rehab    Independent/assisted living    Hospice    Other:       PATIENT CONDITION AT DISCHARGE:     Functional status    Poor     Deconditioned    X Independent      Cognition    X Lucid     Forgetful     Dementia      Catheters/lines (plus indication)    Ferguson     PICC     PEG    X None      Code status    X Full code     DNR      PHYSICAL EXAMINATION AT DISCHARGE:     Constitutional:  No acute distress   ENT:  Oral mucous moist, oropharynx benign. Neck supple,    Resp:  CTA bilaterally. No wheezing/rhonchi/rales. No accessory muscle use   CV:  Regular rhythm, normal rate, no murmurs, gallops, rubs    GI:  Soft, non distended, non tender.  normoactive bowel sounds, no hepatosplenomegaly     Musculoskeletal:  No edema, warm, 2+ pulses throughout    Neurologic:  Moves all extremities.   AAOx3, CN II-XII reviewed           CHRONIC MEDICAL DIAGNOSES:  Problem List as of 10/18/2017  Date Reviewed: 10/15/2017          Codes Class Noted - Resolved    * (Principal)TIA (transient ischemic attack) ICD-10-CM: G45.9  ICD-9-CM: 435.9  10/14/2017 - Present              Greater than 30 minutes were spent with the patient on counseling and coordination of care    Signed:   Benito Fernandes MD  10/18/2017  2:01 PM

## 2017-10-18 NOTE — PROGRESS NOTES
Stroke Education documented in Patient Education: YES  Core Measures Documented in [facility]:  Risk Factors: YES  Warning signs of stroke: YES  When to Activate 911: YES  Medication Education for Risk Factors: YES  Smoking cessation if applicable: YES  Written Education Given:  YES    Discharge NIH Completed: YES  Score: 0    BRAINS: YES    Follow Up Appointment Made: YES  Date/Time if applicable: 16/25/33  I have reviewed discharge instructions with the patient. The patient verbalized understanding. Pt being discharged to home. Vitals WNL. Belongings with patient.

## 2017-10-18 NOTE — CONSULTS
PSYCHIATRIC CONSULTATION                         IDENTIFICATION:    Patient Name  Teagan Ocampo   Date of Birth 1955   [facility] [id number]   Medical Record Number  [id number]      Age  60 y.o. PCP Donnella Ahumada, MD   Admit date:  10/14/2017    Room Number  671/01  @ [facility]   Date of Service  10/18/2017            HISTORY         REASON FOR HOSPITALIZATION/CONSULTATION:  Admitted for spells of unawareness and speech disturbances and psychiatry asked to see re dx/tx. CC: \"hav these episodes this past week\"    HISTORY OF PRESENT ILLNESS:    Pt with above noted spells, last briefly re unawareness and then followed by dysathria and return to full neurologic fx with speech last to return to baseline. Pt with polycythemia and tols by PCP that one possibility is a tumor producing erythropoietin and pt took this to be dx of cancer as likely dx. States she cried for one night and then returned to ebullient self. Sons present and spokesman stated hx accurate and family upset re discharge before definitive dx. Staff notes spells correlate with family visits. ALLERGIES:  Allergies   Allergen Reactions    Darvocet A500 [Propoxyphene N-Acetaminophen] Shortness of Breath    Iodine Hives    Sulfa (Sulfonamide Antibiotics) Other (comments)      MEDICATIONS PRIOR TO ADMISSION:  Prescriptions Prior to Admission   Medication Sig    clindamycin (CLEOCIN) 300 mg capsule Take 300 mg by mouth three (3) times daily.  ergocalciferol (VITAMIN D2) 50,000 unit capsule Take 50,000 Units by mouth.  naproxen (NAPROSYN) 500 mg tablet Take 500 mg by mouth two (2) times daily (with meals).  ipratropium-albuterol (COMBIVENT RESPIMAT)  mcg/actuation inhaler Take 1 Puff by inhalation every six (6) hours.       PAST MEDICAL HISTORY:no psychiatric hx  Active Ambulatory Problems     Diagnosis Date Noted    No Active Ambulatory Problems     Resolved Ambulatory Problems     Diagnosis Date Noted    No Resolved Ambulatory Problems     Past Medical History:   Diagnosis Date    [facility]     Cancer [facility]     Ill-defined condition       Past Medical History:   Diagnosis Date    Cancer ([facility]     cervical cancer    Cancer ([facility]     skin cancer    Ill-defined condition     lyme disease     Past Surgical History:   Procedure Laterality Date    HX GI      isaias colectomy    HX GYN      partial hysterectomy    HX GYN      c section    HX ORTHOPAEDIC      RIGHT shoulder surgery    HX ORTHOPAEDIC      RIGHT foot surgery    NEUROLOGICAL PROCEDURE UNLISTED      lumber surgery      SOCIAL HISTORY: works parttime, lives by self, supportive family, faces upsets by moving away, sometimes with moderate + alcohol use, sometimes with getting busy. Social History     Social History Narrative     Social History   Substance Use Topics    Smoking status: Current Every Day Smoker     Packs/day: 1.00    Smokeless tobacco: Never Used    Alcohol use 6.0 oz/week     10 Shots of liquor per week      FAMILY HISTORY: History reviewed. No pertinent family history. History reviewed. No pertinent family history.     REVIEW of SYSTEMS:   Slurring words             MENTAL STATUS EXAM & VITALS       MENTAL STATUS EXAM:    FINDINGS WITHIN NORMAL LIMITS (WNL) UNLESS OTHERWISE STATED BELOW:    Orientation oriented to time, place and person   Vital Signs (BP,Pulse, Temp) See below (reviewed 10/18/2017)   Gait and Station Within normal limits   Abnormal Muscular Movements/Tone/Behavior No EPS, no Tardive Dyskinesia, no abnormal muscular movements; wnl tone   Relations cooperative   General Appearance:  age appropriate   Language No aphasia or dysarthria   Speech:  normal pitch, normal volume and non-pressured   Thought Processes logical, wnl rate of thoughts, good abstract reasoning and computation   Thought Associations goal directed   Thought Content free of delusions, free of hallucinations and not internally preoccupied    Suicidal Ideations none   Homicidal Ideations none   Mood:   On surface ok but worry/sadness underneath   Affect:  anxious   Memory recent  adequate   Memory remote:  adequate   Concentration/Attention:  adequate   Fund of Knowledge Fair/average   Insight:  limited   Reliability good   Judgment:  limited and as for insight ok for general topics and impaired for psychological topics          VITALS:     Patient Vitals for the past 24 hrs:   Temp Pulse Resp BP SpO2   10/18/17 0700 97.6 °F (36.4 °C) 70 19 117/71 100 %   10/18/17 0300 97.9 °F (36.6 °C) 85 16 119/81 100 %   10/17/17 2300 97.7 °F (36.5 °C) 85 18 115/78 100 %   10/17/17 1900 98 °F (36.7 °C) 87 20 121/76 98 %   10/17/17 1500 97.9 °F (36.6 °C) 72 17 110/71 97 %   10/17/17 1100 98.7 °F (37.1 °C) 75 17 117/72 94 %              DATA     LABORATORY DATA:  Labs Reviewed   CBC WITH AUTOMATED DIFF - Abnormal; Notable for the following:        Result Value    RBC 5.66 (*)     HGB 19.5 (*)     HCT 54.8 (*)     MCH 34.5 (*)     All other components within normal limits   METABOLIC PANEL, COMPREHENSIVE - Abnormal; Notable for the following:     Glucose 110 (*)     A-G Ratio 1.0 (*)     All other components within normal limits   METABOLIC PANEL, BASIC - Abnormal; Notable for the following:     Sodium 147 (*)     Potassium 3.3 (*)     Chloride 117 (*)     Calcium 7.0 (*)     All other components within normal limits   CBC WITH AUTOMATED DIFF - Abnormal; Notable for the following:     HGB 17.0 (*)     HCT 49.2 (*)     MCH 34.1 (*)     All other components within normal limits   LIPID PANEL - Abnormal; Notable for the following:     Triglyceride 187 (*)     LDL, calculated 104.6 (*)     All other components within normal limits   CBC WITH AUTOMATED DIFF - Abnormal; Notable for the following:     HGB 16.3 (*)     HCT 47.6 (*)     All other components within normal limits   METABOLIC PANEL, BASIC - Abnormal; Notable for the following:     Chloride 110 (*)     Glucose 119 (*)     All other components within normal limits   CBC WITH AUTOMATED DIFF - Abnormal; Notable for the following:     HGB 16.1 (*)     HCT 47.2 (*)     All other components within normal limits   GLUCOSE, POC - Abnormal; Notable for the following:     Glucose (POC) 141 (*)     All other components within normal limits   POC INR - Abnormal; Notable for the following:     INR (POC) 1.3 (*)     All other components within normal limits   GLUCOSE, POC - Abnormal; Notable for the following:     Glucose (POC) 116 (*)     All other components within normal limits   SAMPLES BEING HELD   TROPONIN I   ETHYL ALCOHOL   LD   ERYTHROPOIETIN   HEMOGLOBIN A1C WITH EAG   PROTHROMBIN TIME + INR   MISC.  LAB TEST   CBC W/O DIFF   METABOLIC PANEL, BASIC   GLUCOSE, POC   POC PT/INR   SAMPLE TO BLOOD BANK     Admission on 10/14/2017   Component Date Value Ref Range Status    Glucose (POC) 10/14/2017 141* 65 - 100 mg/dL Final    Performed by 10/14/2017 Karly Curling   Final    INR (POC) 10/14/2017 1.3* <1.2   Final    Ventricular Rate 10/14/2017 81  BPM Final    Atrial Rate 10/14/2017 81  BPM Final    P-R Interval 10/14/2017 134  ms Final    QRS Duration 10/14/2017 60  ms Final    Q-T Interval 10/14/2017 370  ms Final    QTC Calculation (Bezet) 10/14/2017 429  ms Final    Calculated P Axis 10/14/2017 79  degrees Final    Calculated R Axis 10/14/2017 24  degrees Final    Calculated T Axis 10/14/2017 87  degrees Final    Diagnosis 10/14/2017    Final                    Value:Normal sinus rhythm  Septal infarct , age undetermined  No previous ECGs available  Confirmed by Jaky Fortune ([id number]) on 10/15/2017 10:05:09 AM      WBC 10/14/2017 7.2  3.6 - 11.0 K/uL Final    RBC 10/14/2017 5.66* 3.80 - 5.20 M/uL Final    HGB 10/14/2017 19.5* 11.5 - 16.0 g/dL Final    HCT 10/14/2017 54.8* 35.0 - 47.0 % Final    MCV 10/14/2017 96.8  80.0 - 99.0 FL Final    MCH 10/14/2017 34.5* 26.0 - 34.0 PG Final    MCHC 10/14/2017 35.6  30.0 - 36.5 g/dL Final    RDW 10/14/2017 12.9  11.5 - 14.5 % Final    PLATELET 73/36/2192 936  150 - 400 K/uL Final    NEUTROPHILS 10/14/2017 71  32 - 75 % Final    LYMPHOCYTES 10/14/2017 20  12 - 49 % Final    MONOCYTES 10/14/2017 8  5 - 13 % Final    EOSINOPHILS 10/14/2017 1  0 - 7 % Final    BASOPHILS 10/14/2017 0  0 - 1 % Final    ABS. NEUTROPHILS 10/14/2017 5.1  1.8 - 8.0 K/UL Final    ABS. LYMPHOCYTES 10/14/2017 1.5  0.8 - 3.5 K/UL Final    ABS. MONOCYTES 10/14/2017 0.6  0.0 - 1.0 K/UL Final    ABS. EOSINOPHILS 10/14/2017 0.1  0.0 - 0.4 K/UL Final    ABS. BASOPHILS 10/14/2017 0.0  0.0 - 0.1 K/UL Final    Sodium 10/14/2017 139  136 - 145 mmol/L Final    Potassium 10/14/2017 3.6  3.5 - 5.1 mmol/L Final    Chloride 10/14/2017 106  97 - 108 mmol/L Final    CO2 10/14/2017 22  21 - 32 mmol/L Final    Anion gap 10/14/2017 11  5 - 15 mmol/L Final    Glucose 10/14/2017 110* 65 - 100 mg/dL Final    BUN 10/14/2017 16  6 - 20 MG/DL Final    Creatinine 10/14/2017 0.90  0.55 - 1.02 MG/DL Final    BUN/Creatinine ratio 10/14/2017 18  12 - 20   Final    GFR est AA 10/14/2017 >60  >60 ml/min/1.73m2 Final    GFR est non-AA 10/14/2017 >60  >60 ml/min/1.73m2 Final    Calcium 10/14/2017 9.7  8.5 - 10.1 MG/DL Final    Bilirubin, total 10/14/2017 0.9  0.2 - 1.0 MG/DL Final    ALT (SGPT) 10/14/2017 19  12 - 78 U/L Final    AST (SGOT) 10/14/2017 17  15 - 37 U/L Final    Alk.  phosphatase 10/14/2017 112  45 - 117 U/L Final    Protein, total 10/14/2017 7.8  6.4 - 8.2 g/dL Final    Albumin 10/14/2017 3.8  3.5 - 5.0 g/dL Final    Globulin 10/14/2017 4.0  2.0 - 4.0 g/dL Final    A-G Ratio 10/14/2017 1.0* 1.1 - 2.2   Final    Troponin-I, Qt. 10/14/2017 <0.04  <0.05 ng/mL Final    ALCOHOL(ETHYL),SERUM 10/14/2017 <10  <10 MG/DL Final    Sodium 10/15/2017 147* 136 - 145 mmol/L Final    Potassium 10/15/2017 3.3* 3.5 - 5.1 mmol/L Final    Chloride 10/15/2017 117* 97 - 108 mmol/L Final    CO2 10/15/2017 21  21 - 32 mmol/L Final    Anion gap 10/15/2017 9  5 - 15 mmol/L Final    Glucose 10/15/2017 81  65 - 100 mg/dL Final    BUN 10/15/2017 12  6 - 20 MG/DL Final    Creatinine 10/15/2017 0.67  0.55 - 1.02 MG/DL Final    BUN/Creatinine ratio 10/15/2017 18  12 - 20   Final    GFR est AA 10/15/2017 >60  >60 ml/min/1.73m2 Final    GFR est non-AA 10/15/2017 >60  >60 ml/min/1.73m2 Final    Calcium 10/15/2017 7.0* 8.5 - 10.1 MG/DL Final    WBC 10/15/2017 6.6  3.6 - 11.0 K/uL Final    RBC 10/15/2017 4.98  3.80 - 5.20 M/uL Final    HGB 10/15/2017 17.0* 11.5 - 16.0 g/dL Final    HCT 10/15/2017 49.2* 35.0 - 47.0 % Final    MCV 10/15/2017 98.8  80.0 - 99.0 FL Final    MCH 10/15/2017 34.1* 26.0 - 34.0 PG Final    MCHC 10/15/2017 34.6  30.0 - 36.5 g/dL Final    RDW 10/15/2017 13.2  11.5 - 14.5 % Final    PLATELET 63/19/7677 839  150 - 400 K/uL Final    NEUTROPHILS 10/15/2017 61  32 - 75 % Final    LYMPHOCYTES 10/15/2017 28  12 - 49 % Final    MONOCYTES 10/15/2017 9  5 - 13 % Final    EOSINOPHILS 10/15/2017 2  0 - 7 % Final    BASOPHILS 10/15/2017 0  0 - 1 % Final    ABS. NEUTROPHILS 10/15/2017 4.1  1.8 - 8.0 K/UL Final    ABS. LYMPHOCYTES 10/15/2017 1.8  0.8 - 3.5 K/UL Final    ABS. MONOCYTES 10/15/2017 0.6  0.0 - 1.0 K/UL Final    ABS. EOSINOPHILS 10/15/2017 0.1  0.0 - 0.4 K/UL Final    ABS.  BASOPHILS 10/15/2017 0.0  0.0 - 0.1 K/UL Final    DF 10/15/2017 SMEAR SCANNED    Final    RBC COMMENTS 10/15/2017 NORMOCYTIC, NORMOCHROMIC    Final    LD 10/15/2017 186  81 - 246 U/L Final    Erythropoietin 10/15/2017 4.3  2.6 - 18.5 mIU/mL Final    LIPID PROFILE 10/15/2017        Final    Cholesterol, total 10/15/2017 193  <200 MG/DL Final    Triglyceride 10/15/2017 187* <150 MG/DL Final    HDL Cholesterol 10/15/2017 51  MG/DL Final    LDL, calculated 10/15/2017 104.6* 0 - 100 MG/DL Final    VLDL, calculated 10/15/2017 37.4  MG/DL Final    CHOL/HDL Ratio 10/15/2017 3.8  0 - 5.0   Final    Hemoglobin A1c 10/15/2017 5.5  4.2 - 6.3 % Final  Est. average glucose 10/15/2017 111  mg/dL Final    INR 10/15/2017 1.0  0.9 - 1.1   Final    Prothrombin time 10/15/2017 10.4  9.0 - 11.1 sec Final    WBC 10/15/2017 6.1  3.6 - 11.0 K/uL Final    RBC 10/15/2017 4.84  3.80 - 5.20 M/uL Final    HGB 10/15/2017 16.3* 11.5 - 16.0 g/dL Final    HCT 10/15/2017 47.6* 35.0 - 47.0 % Final    MCV 10/15/2017 98.3  80.0 - 99.0 FL Final    MCH 10/15/2017 33.7  26.0 - 34.0 PG Final    MCHC 10/15/2017 34.2  30.0 - 36.5 g/dL Final    RDW 10/15/2017 13.0  11.5 - 14.5 % Final    PLATELET 28/73/7875 955  150 - 400 K/uL Final    NEUTROPHILS 10/15/2017 57  32 - 75 % Final    LYMPHOCYTES 10/15/2017 33  12 - 49 % Final    MONOCYTES 10/15/2017 8  5 - 13 % Final    EOSINOPHILS 10/15/2017 2  0 - 7 % Final    BASOPHILS 10/15/2017 0  0 - 1 % Final    ABS. NEUTROPHILS 10/15/2017 3.5  1.8 - 8.0 K/UL Final    ABS. LYMPHOCYTES 10/15/2017 2.0  0.8 - 3.5 K/UL Final    ABS. MONOCYTES 10/15/2017 0.5  0.0 - 1.0 K/UL Final    ABS. EOSINOPHILS 10/15/2017 0.1  0.0 - 0.4 K/UL Final    ABS.  BASOPHILS 10/15/2017 0.0  0.0 - 0.1 K/UL Final    Sodium 10/15/2017 142  136 - 145 mmol/L Final    Potassium 10/15/2017 3.8  3.5 - 5.1 mmol/L Final    Chloride 10/15/2017 110* 97 - 108 mmol/L Final    CO2 10/15/2017 25  21 - 32 mmol/L Final    Anion gap 10/15/2017 7  5 - 15 mmol/L Final    Glucose 10/15/2017 119* 65 - 100 mg/dL Final    BUN 10/15/2017 12  6 - 20 MG/DL Final    Creatinine 10/15/2017 0.75  0.55 - 1.02 MG/DL Final    BUN/Creatinine ratio 10/15/2017 16  12 - 20   Final    GFR est AA 10/15/2017 >60  >60 ml/min/1.73m2 Final    GFR est non-AA 10/15/2017 >60  >60 ml/min/1.73m2 Final    Calcium 10/15/2017 8.7  8.5 - 10.1 MG/DL Final    Glucose (POC) 10/15/2017 116* 65 - 100 mg/dL Final    Performed by 10/15/2017 [facility]   Final    Glucose (POC) 10/16/2017 93  65 - 100 mg/dL Final    Performed by 10/16/2017 Sameera Armando   Final    Test Description: 10/16/2017 JAK2 WITH REFLEX TO CALR, MPL. [facility] TEST ID [id number]   Final    Reference Lab: 10/16/2017  [id number]   Final    Results: 10/16/2017 PENDING   Incomplete    WBC 10/17/2017 7.6  3.6 - 11.0 K/uL Final    RBC 10/17/2017 4.81  3.80 - 5.20 M/uL Final    HGB 10/17/2017 16.1* 11.5 - 16.0 g/dL Final    HCT 10/17/2017 47.2* 35.0 - 47.0 % Final    MCV 10/17/2017 98.1  80.0 - 99.0 FL Final    MCH 10/17/2017 33.5  26.0 - 34.0 PG Final    MCHC 10/17/2017 34.1  30.0 - 36.5 g/dL Final    RDW 10/17/2017 12.6  11.5 - 14.5 % Final    PLATELET 01/74/9848 890  150 - 400 K/uL Final    NEUTROPHILS 10/17/2017 59  32 - 75 % Final    LYMPHOCYTES 10/17/2017 31  12 - 49 % Final    MONOCYTES 10/17/2017 7  5 - 13 % Final    EOSINOPHILS 10/17/2017 3  0 - 7 % Final    BASOPHILS 10/17/2017 0  0 - 1 % Final    ABS. NEUTROPHILS 10/17/2017 4.5  1.8 - 8.0 K/UL Final    ABS. LYMPHOCYTES 10/17/2017 2.4  0.8 - 3.5 K/UL Final    ABS. MONOCYTES 10/17/2017 0.5  0.0 - 1.0 K/UL Final    ABS. EOSINOPHILS 10/17/2017 0.2  0.0 - 0.4 K/UL Final    ABS. BASOPHILS 10/17/2017 0.0  0.0 - 0.1 K/UL Final        RADIOLOGY REPORTS:    Results from Hospital Encounter encounter on 10/14/17   XR CHEST PORT   Narrative INDICATION:  cp     EXAM: Chest single view. COMPARISON: None. Balaji Means FINDINGS: A single frontal view of the chest at 1604 hours shows clear lungs. The heart, mediastinum and pulmonary vasculature are stable . The bony thorax  is unremarkable for age. .         Impression IMPRESSION:  No acute cardiopulmonary disease radiographically. .  . Mra Brain Wo Cont    Result Date: 10/14/2017  INDICATION: slurred speech, vertigo EXAM: MRA HEAD, WITHOUT CONTRAST. COMPARISON:  Brain MRI TECHNIQUE:  2-D time-of-flight MRA of the brain was performed. Multiplanar reconstructions were obtained. FINDINGS: The left vertebral artery is slightly dominant. . The basilar artery and its branches are normal. The internal carotid, anterior cerebral, and middle cerebral arteries are patent. There is no flow-limiting intracranial stenosis. There is no aneurysm. There are no sizable posterior communicating arteries. IMPRESSION: No flow limiting stenosis or intracranial aneurysm. Mra Neck Wo Cont    Result Date: 10/14/2017  INDICATION:  slurred speech, vertigo EXAM: MRA NECK. COMPARISON:  None TECHNIQUE: 2-D time-of-flight MRA of the neck was performed. Multiplanar reconstructions were obtained. CONTRAST: None administered  FINDINGS: The aortic arch level is not included. . The common carotid and internal carotid arteries are patent with no obvious flow-limiting stenosis. . The vertebral arteries are codominant and patent. IMPRESSION: No flow-limiting stenosis on 2-D MRA of the neck. Mri Brain Wo Cont    Result Date: 10/14/2017  INDICATION: EXAM: MRI BRAIN WITHOUT CONTRAST. COMPARISON: None  . CONTRAST: None administered. PULSE SEQUENCES: Sagittal T1-weighted images, axial T1-weighted images, coronal T1-weighted images , axial FLAIR and T2 star weighted images, axial diffusion weighted echo planar images, axial ADC mapping. FINDINGS: Scattered foci of T2 and FLAIR hyperintensity are noted in the deep white matter tracts of both cerebral hemispheres, left greater than right. They are not predominantly periventricular, but more subcortical. The brain parenchyma and ventricular system are otherwise unremarkable in appearance for age. No parenchymal mass or hemorrhage and no shift of midline structures. No extra-axial fluid or blood collection. No additional signal abnormality. The craniocervical junction is normal for age, as are other midline structures. Large  vessels appear patent on spin-echo imaging. There is no acute or subacute ischemia on diffusion weighting. Nick Villalba IMPRESSION: 1. No acute or subacute ischemia or other acute intracranial abnormality. 2. Mild microvascular ischemic and other age-related change.      Ct Abd Pelv Wo Cont    Result Date: 10/17/2017  INDICATION: Abdominal pain, mild, Crohn's disease. Exam: Noncontrast CT of the abdomen and pelvis is performed with 5 mm collimation. Sagittal and coronal reformatted images were also performed. CT dose reduction was achieved through the use of a standardized protocol tailored for this examination and automatic exposure control for dose modulation. There is no prior study for direct comparison. FINDINGS: There is very mild bibasilar scarring. Abdomen: Liver: The liver is normal on noncontrast images. Spleen: The spleen is normal on noncontrast images. Adrenals: The adrenals are normal on noncontrast images. Pancreas: The pancreas is normal on noncontrast images. Gallbladder: The gallbladder is normal on noncontrast images. Kidneys: There is a 3 mm nonobstructing right renal calculus. A 1 mm nonobstructing left renal calculus. There is no hydronephrosis. Bowel: No thickened or dilated loop of large or small bowel seen. Anastomotic chain sutures are noted in the mid descending colon. There is a moderate amount stool in the colon. Appendix: The appendix is visualized, however there are no secondary signs of acute appendicitis. Pelvis: Urinary bladder is partially filled and grossly normal. Miscellaneous: There is no free intraperitoneal gas or fluid. There is no focal fluid collection to suggest abscess. The uterus is absent. IMPRESSION: Nonobstructing bilateral nephrolithiasis. Xr Chest Port    Result Date: 10/14/2017  INDICATION:  cp EXAM: Chest single view. COMPARISON: None. Chelsie  FINDINGS: A single frontal view of the chest at 1604 hours shows clear lungs. The heart, mediastinum and pulmonary vasculature are stable . The bony thorax is unremarkable for age. [place]  IMPRESSION: No acute cardiopulmonary disease radiographically. .  . Ct Code Neuro Head Wo Contrast    Result Date: 10/15/2017  EXAM:  CT CODE NEURO HEAD WO CONTRAST INDICATION:   Code S - slurred speech COMPARISON: 10/14/2017.  CONTRAST: None. TECHNIQUE: Unenhanced CT of the head was performed using 5 mm images. Brain and bone windows were generated. CT dose reduction was achieved through use of a standardized protocol tailored for this examination and automatic exposure control for dose modulation. FINDINGS: The ventricles and sulci are normal in size, shape and configuration and midline. There is no significant white matter disease. There is no intracranial hemorrhage, extra-axial collection, mass, mass effect or midline shift. The basilar cisterns are open. No acute infarct is identified. The bone windows demonstrate no abnormalities. The visualized portions of the paranasal sinuses and mastoid air cells are clear. IMPRESSION: No acute intracranial process seen    Ct Code Neuro Head Wo Contrast    Result Date: 10/14/2017  EXAM:  CT CODE NEURO HEAD WO CONTRAST INDICATION:   weakness COMPARISON: None. CONTRAST:  None. TECHNIQUE: Unenhanced CT of the head was performed using 5 mm images. Brain and bone windows were generated. CT dose reduction was achieved through use of a standardized protocol tailored for this examination and automatic exposure control for dose modulation. FINDINGS: The ventricles and sulci are normal in size, shape and configuration and midline. There is no significant white matter disease. There is no intracranial hemorrhage, extra-axial collection, mass, mass effect or midline shift. 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 The basilar cisterns are open. No acute infarct is identified. The bone windows demonstrate no abnormalities. The visualized portions of the paranasal sinuses and mastoid air cells are clear. IMPRESSION: No acute intracranial abnormality.               MEDICATIONS       ALL MEDICATIONS  Current Facility-Administered Medications   Medication Dose Route Frequency    atorvastatin (LIPITOR) tablet 20 mg  20 mg Oral QHS    ondansetron (ZOFRAN) injection 4 mg  4 mg IntraVENous Q6H PRN    sodium chloride (NS) flush 5 mL  5 mL IntraVENous PRN    albuterol-ipratropium (DUO-NEB) 2.5 MG-0.5 MG/3 ML  3 mL Nebulization Q6H PRN    sodium chloride (NS) flush 5-10 mL  5-10 mL IntraVENous Q8H    sodium chloride (NS) flush 5-10 mL  5-10 mL IntraVENous PRN    enoxaparin (LOVENOX) injection 40 mg  40 mg SubCUTAneous Q24H    aspirin chewable tablet 81 mg  81 mg Oral DAILY    0.9% sodium chloride infusion  100 mL/hr IntraVENous CONTINUOUS    ALPRAZolam (XANAX) tablet 0.25 mg  0.25 mg Oral QID PRN    calcium carbonate (TUMS) chewable tablet 200 mg [elemental]  200 mg Oral QID PRN    influenza vaccine 2017-18 (3 yrs+)(PF) (FLUZONE QUAD/FLUARIX QUAD) injection 0.5 mL  0.5 mL IntraMUSCular PRIOR TO DISCHARGE      SCHEDULED MEDICATIONS  Current Facility-Administered Medications   Medication Dose Route Frequency    atorvastatin (LIPITOR) tablet 20 mg  20 mg Oral QHS    sodium chloride (NS) flush 5-10 mL  5-10 mL IntraVENous Q8H    enoxaparin (LOVENOX) injection 40 mg  40 mg SubCUTAneous Q24H    aspirin chewable tablet 81 mg  81 mg Oral DAILY    0.9% sodium chloride infusion  100 mL/hr IntraVENous CONTINUOUS    influenza vaccine 2017-18 (3 yrs+)(PF) (FLUZONE QUAD/FLUARIX QUAD) injection 0.5 mL  0.5 mL IntraMUSCular PRIOR TO DISCHARGE                ASSESSMENT & PLAN        The patient Vanessa Coon is a 60 y.o.  female who presents at this time for treatment of the following diagnoses:  Patient Active Hospital Problem List:   TIA (transient ischemic attack) (10/14/2017)    Assessment: Anxiety somatic episodes. Pt developed spells following news about tumor in differential.  She has past hx of 2 cancers. Most likely this is an uinconscious defense and best be addressed by finding out definitive diagnosis as soon as possible. Plan: Resolve  Diagnosis, will likely benefit from psychiatric/counseling dpending on dx.          A coordinated, multidisplinary treatment team round was conducted with the patient; that includes the nurse, unit pharmcist,  and writer all present. The following regarding medications was addressed during rounds with patient:   the risks and benefits of the proposed medication. The patient was given the opportunity to ask questions. Informed consent given to the use of the above medications. I will continue to adjust psychiatric and non-psychiatric medications (see above \"medication\" section and orders section for details) as deemed appropriate & based upon diagnoses and response to treatment. I have reviewed admission (and previous/old) labs and medical tests in the EHR and or transferring hospital documents. I will continue to order blood tests/labs and diagnostic tests as deemed appropriate and review results as they become available (see orders for details). I have reviewed old psychiatric and medical records available in the EHR. I Will order additional psychiatric records from other institutions to further elucidate the nature of patient's psychopathology and review once available. I will gather additional collateral information from friends, family and o/p treatment team to further elucidate the nature of patient's psychopathology and baselline level of psychiatric functioning.                      SIGNED:    Papa Degroot MD  10/18/2017

## 2017-10-18 NOTE — INTERDISCIPLINARY ROUNDS
IDR/SLIDR Summary          Patient: Mari Salamanca MRN: [id number]    Age: 60 y.o. YOB: 1955 Room/Bed: [facility]   Admit Diagnosis: TIA (transient ischemic attack)  TIA (transient ischemic attack)  TIA (transient ischemic attack)  TIA (transient ischemic attack)  Principal Diagnosis: TIA (transient ischemic attack)   Goals: Discharge  Readmission: NO  Quality Measure: Not applicable  VTE Prophylaxis: Chemical  Influenza Vaccine screening completed? YES  Pneumococcal Vaccine screening completed? YES  Mobility needs: No   Nutrition plan:Yes  Consults: P. T and Case Management    Financial concerns:Yes  Escalated to CM? YES  RRAT Score: 10   Interventions:H2H  Testing due for pt today?  NO  LOS: 4 days Expected length of stay 5 days  Discharge plan: Home   PCP: Akhil Dial MD  Transportation needs: No    Days before discharge:two or more days before discharge   Discharge disposition: Home    Signed:     Soco Moctezuma  10/18/2017  12:10 AM

## 2017-11-03 LAB
Lab: NORMAL
REFERENCE LAB,REFLB: NORMAL
TEST DESCRIPTION:,ATST: NORMAL

## 2021-05-26 ENCOUNTER — APPOINTMENT (OUTPATIENT)
Dept: CT IMAGING | Age: 66
End: 2021-05-26
Attending: EMERGENCY MEDICINE
Payer: MEDICARE

## 2021-05-26 ENCOUNTER — HOSPITAL ENCOUNTER (OUTPATIENT)
Age: 66
Setting detail: OBSERVATION
Discharge: HOME | End: 2021-05-28
Attending: EMERGENCY MEDICINE | Admitting: INTERNAL MEDICINE
Payer: MEDICARE

## 2021-05-26 DIAGNOSIS — R31.9 URINARY TRACT INFECTION WITH HEMATURIA, SITE UNSPECIFIED: ICD-10-CM

## 2021-05-26 DIAGNOSIS — N20.0 KIDNEY STONE: Primary | ICD-10-CM

## 2021-05-26 DIAGNOSIS — N39.0 URINARY TRACT INFECTION WITH HEMATURIA, SITE UNSPECIFIED: ICD-10-CM

## 2021-05-26 DIAGNOSIS — R10.9 RIGHT FLANK PAIN: ICD-10-CM

## 2021-05-26 DIAGNOSIS — K64.9 BLEEDING HEMORRHOID: ICD-10-CM

## 2021-05-26 PROBLEM — N13.30 HYDRONEPHROSIS: Status: ACTIVE | Noted: 2021-05-26

## 2021-05-26 PROBLEM — K62.5 BRBPR (BRIGHT RED BLOOD PER RECTUM): Status: ACTIVE | Noted: 2021-05-26

## 2021-05-26 PROBLEM — N28.9 RENAL INSUFFICIENCY: Status: ACTIVE | Noted: 2021-05-26

## 2021-05-26 PROBLEM — G45.9 TIA (TRANSIENT ISCHEMIC ATTACK): Status: RESOLVED | Noted: 2017-10-14 | Resolved: 2021-05-26

## 2021-05-26 LAB
ALBUMIN SERPL-MCNC: 3.7 G/DL (ref 3.5–5)
ALBUMIN/GLOB SERPL: 0.9 {RATIO} (ref 1.1–2.2)
ALP SERPL-CCNC: 76 U/L (ref 45–117)
ALT SERPL-CCNC: 43 U/L (ref 12–78)
ANION GAP SERPL CALC-SCNC: 8 MMOL/L (ref 5–15)
APPEARANCE UR: ABNORMAL
AST SERPL-CCNC: 25 U/L (ref 15–37)
BACTERIA URNS QL MICRO: NEGATIVE /HPF
BASOPHILS # BLD: 0 K/UL (ref 0–0.1)
BASOPHILS NFR BLD: 0 % (ref 0–1)
BILIRUB SERPL-MCNC: 0.4 MG/DL (ref 0.2–1)
BILIRUB UR QL: NEGATIVE
BUN SERPL-MCNC: 22 MG/DL (ref 6–20)
BUN/CREAT SERPL: 20 (ref 12–20)
CALCIUM SERPL-MCNC: 8.9 MG/DL (ref 8.5–10.1)
CHLORIDE SERPL-SCNC: 109 MMOL/L (ref 97–108)
CO2 SERPL-SCNC: 23 MMOL/L (ref 21–32)
COLOR UR: ABNORMAL
COMMENT, HOLDF: NORMAL
CREAT SERPL-MCNC: 1.1 MG/DL (ref 0.55–1.02)
DIFFERENTIAL METHOD BLD: ABNORMAL
EOSINOPHIL # BLD: 0.2 K/UL (ref 0–0.4)
EOSINOPHIL NFR BLD: 4 % (ref 0–7)
EPITH CASTS URNS QL MICRO: ABNORMAL /LPF
ERYTHROCYTE [DISTWIDTH] IN BLOOD BY AUTOMATED COUNT: 12.2 % (ref 11.5–14.5)
GLOBULIN SER CALC-MCNC: 3.9 G/DL (ref 2–4)
GLUCOSE SERPL-MCNC: 89 MG/DL (ref 65–100)
GLUCOSE UR STRIP.AUTO-MCNC: NEGATIVE MG/DL
HCT VFR BLD AUTO: 45.4 % (ref 35–47)
HEMOCCULT STL QL: POSITIVE
HGB BLD-MCNC: 15.6 G/DL (ref 11.5–16)
HGB UR QL STRIP: ABNORMAL
IMM GRANULOCYTES # BLD AUTO: 0 K/UL (ref 0–0.04)
IMM GRANULOCYTES NFR BLD AUTO: 0 % (ref 0–0.5)
INR PPP: 1 (ref 0.9–1.1)
KETONES UR QL STRIP.AUTO: NEGATIVE MG/DL
LEUKOCYTE ESTERASE UR QL STRIP.AUTO: ABNORMAL
LIPASE SERPL-CCNC: 76 U/L (ref 73–393)
LYMPHOCYTES # BLD: 1.4 K/UL (ref 0.8–3.5)
LYMPHOCYTES NFR BLD: 23 % (ref 12–49)
MCH RBC QN AUTO: 32.4 PG (ref 26–34)
MCHC RBC AUTO-ENTMCNC: 34.4 G/DL (ref 30–36.5)
MCV RBC AUTO: 94.4 FL (ref 80–99)
MONOCYTES # BLD: 0.9 K/UL (ref 0–1)
MONOCYTES NFR BLD: 14 % (ref 5–13)
NEUTS SEG # BLD: 3.6 K/UL (ref 1.8–8)
NEUTS SEG NFR BLD: 59 % (ref 32–75)
NITRITE UR QL STRIP.AUTO: NEGATIVE
NRBC # BLD: 0 K/UL (ref 0–0.01)
NRBC BLD-RTO: 0 PER 100 WBC
PH UR STRIP: 5.5 [PH] (ref 5–8)
PLATELET # BLD AUTO: 241 K/UL (ref 150–400)
PMV BLD AUTO: 11.4 FL (ref 8.9–12.9)
POTASSIUM SERPL-SCNC: 4 MMOL/L (ref 3.5–5.1)
PROT SERPL-MCNC: 7.6 G/DL (ref 6.4–8.2)
PROT UR STRIP-MCNC: NEGATIVE MG/DL
PROTHROMBIN TIME: 10.3 SEC (ref 9–11.1)
RBC # BLD AUTO: 4.81 M/UL (ref 3.8–5.2)
RBC #/AREA URNS HPF: ABNORMAL /HPF (ref 0–5)
SAMPLES BEING HELD,HOLD: NORMAL
SODIUM SERPL-SCNC: 140 MMOL/L (ref 136–145)
SP GR UR REFRACTOMETRY: 1.02 (ref 1–1.03)
TROPONIN I SERPL-MCNC: <0.05 NG/ML
UR CULT HOLD, URHOLD: NORMAL
UROBILINOGEN UR QL STRIP.AUTO: 0.2 EU/DL (ref 0.2–1)
WBC # BLD AUTO: 6.1 K/UL (ref 3.6–11)
WBC URNS QL MICRO: ABNORMAL /HPF (ref 0–4)

## 2021-05-26 PROCEDURE — 81001 URINALYSIS AUTO W/SCOPE: CPT

## 2021-05-26 PROCEDURE — 83690 ASSAY OF LIPASE: CPT

## 2021-05-26 PROCEDURE — 99218 HC RM OBSERVATION: CPT

## 2021-05-26 PROCEDURE — 85610 PROTHROMBIN TIME: CPT

## 2021-05-26 PROCEDURE — 96374 THER/PROPH/DIAG INJ IV PUSH: CPT

## 2021-05-26 PROCEDURE — 74011250636 HC RX REV CODE- 250/636: Performed by: EMERGENCY MEDICINE

## 2021-05-26 PROCEDURE — 74011250636 HC RX REV CODE- 250/636: Performed by: INTERNAL MEDICINE

## 2021-05-26 PROCEDURE — 85025 COMPLETE CBC W/AUTO DIFF WBC: CPT

## 2021-05-26 PROCEDURE — 74176 CT ABD & PELVIS W/O CONTRAST: CPT

## 2021-05-26 PROCEDURE — 74011000250 HC RX REV CODE- 250: Performed by: EMERGENCY MEDICINE

## 2021-05-26 PROCEDURE — 84484 ASSAY OF TROPONIN QUANT: CPT

## 2021-05-26 PROCEDURE — 74011250637 HC RX REV CODE- 250/637: Performed by: INTERNAL MEDICINE

## 2021-05-26 PROCEDURE — 96376 TX/PRO/DX INJ SAME DRUG ADON: CPT

## 2021-05-26 PROCEDURE — 96375 TX/PRO/DX INJ NEW DRUG ADDON: CPT

## 2021-05-26 PROCEDURE — 99285 EMERGENCY DEPT VISIT HI MDM: CPT

## 2021-05-26 PROCEDURE — 80053 COMPREHEN METABOLIC PANEL: CPT

## 2021-05-26 PROCEDURE — 87086 URINE CULTURE/COLONY COUNT: CPT

## 2021-05-26 PROCEDURE — 82272 OCCULT BLD FECES 1-3 TESTS: CPT

## 2021-05-26 PROCEDURE — 36415 COLL VENOUS BLD VENIPUNCTURE: CPT

## 2021-05-26 RX ORDER — DEXTROSE, SODIUM CHLORIDE, AND POTASSIUM CHLORIDE 5; .45; .15 G/100ML; G/100ML; G/100ML
75 INJECTION INTRAVENOUS CONTINUOUS
Status: DISCONTINUED | OUTPATIENT
Start: 2021-05-26 | End: 2021-05-28 | Stop reason: HOSPADM

## 2021-05-26 RX ORDER — FAMOTIDINE 20 MG/1
20 TABLET, FILM COATED ORAL 2 TIMES DAILY
Status: DISCONTINUED | OUTPATIENT
Start: 2021-05-26 | End: 2021-05-28 | Stop reason: HOSPADM

## 2021-05-26 RX ORDER — MELATONIN
1000 DAILY
COMMUNITY

## 2021-05-26 RX ORDER — ONDANSETRON 4 MG/1
4 TABLET, ORALLY DISINTEGRATING ORAL
Status: DISCONTINUED | OUTPATIENT
Start: 2021-05-26 | End: 2021-05-28 | Stop reason: HOSPADM

## 2021-05-26 RX ORDER — MORPHINE SULFATE 2 MG/ML
2 INJECTION, SOLUTION INTRAMUSCULAR; INTRAVENOUS
Status: DISCONTINUED | OUTPATIENT
Start: 2021-05-26 | End: 2021-05-28 | Stop reason: HOSPADM

## 2021-05-26 RX ORDER — POLYETHYLENE GLYCOL 3350 17 G/17G
17 POWDER, FOR SOLUTION ORAL DAILY PRN
Status: DISCONTINUED | OUTPATIENT
Start: 2021-05-26 | End: 2021-05-28 | Stop reason: HOSPADM

## 2021-05-26 RX ORDER — ACETAMINOPHEN 325 MG/1
650 TABLET ORAL
Status: DISCONTINUED | OUTPATIENT
Start: 2021-05-26 | End: 2021-05-28 | Stop reason: HOSPADM

## 2021-05-26 RX ORDER — ONDANSETRON 2 MG/ML
4 INJECTION INTRAMUSCULAR; INTRAVENOUS
Status: COMPLETED | OUTPATIENT
Start: 2021-05-26 | End: 2021-05-26

## 2021-05-26 RX ORDER — ACETAMINOPHEN 650 MG/1
650 SUPPOSITORY RECTAL
Status: DISCONTINUED | OUTPATIENT
Start: 2021-05-26 | End: 2021-05-28 | Stop reason: HOSPADM

## 2021-05-26 RX ORDER — ONDANSETRON 2 MG/ML
4 INJECTION INTRAMUSCULAR; INTRAVENOUS
Status: DISCONTINUED | OUTPATIENT
Start: 2021-05-26 | End: 2021-05-28 | Stop reason: HOSPADM

## 2021-05-26 RX ORDER — KETOROLAC TROMETHAMINE 30 MG/ML
15 INJECTION, SOLUTION INTRAMUSCULAR; INTRAVENOUS
Status: COMPLETED | OUTPATIENT
Start: 2021-05-26 | End: 2021-05-26

## 2021-05-26 RX ADMIN — KETOROLAC TROMETHAMINE 15 MG: 30 INJECTION, SOLUTION INTRAMUSCULAR; INTRAVENOUS at 12:36

## 2021-05-26 RX ADMIN — MORPHINE SULFATE 2 MG: 2 INJECTION, SOLUTION INTRAMUSCULAR; INTRAVENOUS at 22:29

## 2021-05-26 RX ADMIN — MORPHINE SULFATE 2 MG: 2 INJECTION, SOLUTION INTRAMUSCULAR; INTRAVENOUS at 18:39

## 2021-05-26 RX ADMIN — DEXTROSE MONOHYDRATE, SODIUM CHLORIDE, AND POTASSIUM CHLORIDE 75 ML/HR: 50; 4.5; 1.49 INJECTION, SOLUTION INTRAVENOUS at 17:38

## 2021-05-26 RX ADMIN — ONDANSETRON 4 MG: 2 INJECTION INTRAMUSCULAR; INTRAVENOUS at 11:59

## 2021-05-26 RX ADMIN — CEFTRIAXONE 1 G: 1 INJECTION, POWDER, FOR SOLUTION INTRAMUSCULAR; INTRAVENOUS at 13:53

## 2021-05-26 RX ADMIN — ONDANSETRON 4 MG: 2 INJECTION INTRAMUSCULAR; INTRAVENOUS at 18:36

## 2021-05-26 RX ADMIN — FAMOTIDINE 20 MG: 20 TABLET ORAL at 17:37

## 2021-05-26 NOTE — H&P
[facility]    Hospitalist Admission Note      NAME:  Tiffanie Angela   :      MRN:  [id number]     PCP:  Elina Tello MD     Date/Time of service:  2021 3:59 PM          Subjective:     CHIEF COMPLAINT: brbpr     HISTORY OF PRESENT ILLNESS:     Ms. Marcus Davison is a 78 y.o.  female who presented to the Emergency Department complaining of brbpr. Noted for many days. Associated with R flank pain. Nausea. ER finds nephrolithiasis and hydronephrosis. Urology consulted and plan AM procedure, likely stent. We will admit her for observation. Past Medical History:   Diagnosis Date    Cancer [facility])     cervical cancer    Cancer ([facility] [address]     skin cancer    Ill-defined condition     lyme disease        Past Surgical History:   Procedure Laterality Date    HX GI      isaias colectomy    HX GYN      partial hysterectomy    HX GYN      c section    HX ORTHOPAEDIC      RIGHT shoulder surgery    HX ORTHOPAEDIC      RIGHT foot surgery    NEUROLOGICAL PROCEDURE UNLISTED      lumber surgery       Social History     Tobacco Use    Smoking status: Current Every Day Smoker     Packs/day: 1.00    Smokeless tobacco: Never Used   Substance Use Topics    Alcohol use: Yes     Alcohol/week: 10.0 standard drinks     Types: 10 Shots of liquor per week        No family history on file. Family hx cannot be fully assessed, since the patient cannot provide information    Allergies   Allergen Reactions    Darvocet A500 [Propoxyphene N-Acetaminophen] Shortness of Breath    Iodine Hives    Prednisone Nausea and Vomiting     \"All steroid cause upset stomach\"    Seafood Hives    Sulfa (Sulfonamide Antibiotics) Nausea and Vomiting        Prior to Admission medications    Medication Sig Start Date End Date Taking? Authorizing Provider   cholecalciferol (Vitamin D3) (1000 Units /25 mcg) tablet Take 1,000 Units by mouth daily.    Yes Provider, Historical   aspirin 81 mg chewable tablet Take 1 Tab by mouth daily. 10/19/17  Yes Gurwinder Quinones MD       Review of Systems:  (bold if positive, if negative)    Gen:  Eyes:  ENT:  CVS:  Pulm:  GI:  nauseaBRBRPGU:  MS:  Skin:  Psych:  Endo:  Hem:  Renal:  flank painNeuro:        Objective:      VITALS:    Vital signs reviewed; most recent are:    Visit Vitals  /79   Pulse 79   Temp 97.8 °F (36.6 °C)   Resp 18   Ht 5' 1\" (1.549 m)   Wt 58.1 kg (128 lb)   SpO2 94%   BMI 24.19 kg/m²     SpO2 Readings from Last 6 Encounters:   05/26/21 94%   10/18/17 100%        No intake or output data in the 24 hours ending 05/26/21 9149     Exam:     Physical Exam:    Gen:  Well-developed, well-nourished, in no acute distress  HEENT:  Pink conjunctivae, PERRL, hearing intact to voice, moist mucous membranes  Neck:  Supple, without masses, thyroid non-tender  Resp:  No accessory muscle use, clear breath sounds without wheezes rales or rhonchi  Card:  No murmurs, normal S1, S2 without thrills, bruits or peripheral edema  Abd:  Soft, non-tender, non-distended, normoactive bowel sounds are present, no mass  Lymph:  No cervical or inguinal adenopathy  Musc:  No cyanosis or clubbing  Skin:  No rashes or ulcers, skin turgor is good  Neuro:  Cranial nerves are grossly intact, no focal motor weakness, follows commands appropriately  Psych:  Good insight, oriented to person, place and time, alert     Labs:    Recent Labs     05/26/21  1113   WBC 6.1   HGB 15.6   HCT 45.4        Recent Labs     05/26/21  1113      K 4.0   *   CO2 23   GLU 89   BUN 22*   CREA 1.10*   CA 8.9   ALB 3.7   TBILI 0.4   ALT 43     Lab Results   Component Value Date/Time    Glucose (POC) 93 10/16/2017 09:19 AM    Glucose (POC) 116 (H) 10/15/2017 02:12 PM     No results for input(s): PH, PCO2, PO2, HCO3, FIO2 in the last 72 hours.   Recent Labs     05/26/21  1147   INR 1.0     All Micro Results     Procedure Component Value Units Date/Time    CULTURE, URINE [[id number]] Collected: 05/26/21 1236 Order Status: Completed Specimen: Urine from Clean catch Updated: 05/26/21 Faheem Chowdary 95 [[id number]] Collected: 05/26/21 1238    Order Status: Completed Specimen: Urine from Serum Updated: 05/26/21 1241     Urine culture hold       Urine on hold in Microbiology dept for 2 days. If unpreserved urine is submitted, it cannot be used for addtional testing after 24 hours, recollection will be required. I have reviewed previous records       Assessment and Plan:      Nephrolithiasis / Hydronephrosis / Flank pain / Hematuria - Awaiting procedure. Urology will manage diet, pain control, DVT prophylaxis and rehab decisions as usual.  Prn IV morphine if needed. Renal insufficiency - Mild, likely a combination of poor PO intake and vomiting, plus some obstructive nephropathy. Hydrate and follow. BRBPR (bright red blood per rectum) / N/V - POA, likely hemorrhoids triggered by strain of vomiting. Vomiting triggered by pain. Stop ASA. No anemia or red flags. Outpatient follow up    Telemetry reviewed:   normal sinus rhythm    Risk of deterioration: high      Total time spent with patient: 48 Minutes I personally reviewed chart, notes, data and current medications in the medical record. I have personally examined and treated the patient at bedside during this period.                  Care Plan discussed with: Patient, Nursing Staff, Consultant/Specialist and >50% of time spent in counseling and coordination of care    Discussed:  Care Plan       ___________________________________________________    Attending Physician: Lou Jimenez MD

## 2021-05-26 NOTE — CONSULTS
New Urology Consult Note    Patient: Ulysses Banker MRN: [id number]  SSN: [id number]    YOB: 1955  Age: 78 y.o. Sex: female            Assessment:     Ulysses Banker is a 78 y.o. female with RLQ pain, nausea and 15mm UPJ stone with moderate hydronephrosis and UTI. UA: +LE, WBC       Recommendations:     Pt  was evaluated in the ED by myself and Dr. Katy Sage. She is hemodynamically stable and afebrile. She does not require urgent intervention but is symptomatic with RLQ pain and nausea associated with a large UPJ stone    - NPO after midnight   - consent for cystoscopy, right retrograde pyelogram, and right stent placement   -send urine for culture   -continue broad spectrum abx   -IV fluids     Thank you for this consult. Please contact [facility] with any further questions/concerns. Conchis Joyner, FNP-C [phone]    History of Present Illness:     Reason for Consult:  Kidney stone     Ulysses Banker is seen in consultation for reasons noted above at the request of Dianna Bosch DO. This is a 78 y.o. female presented to ED with c/o rectal bleeding. She reports intermittent RLQ pain, nausea and vomiting for 4 months, the pain returned 2 weeks ago and has been constant since onset with varied intensity, accompanied by nausea and rectal bleeding. She denies flank pain, hematuria, or fever/chills. She was evaluated in the ED and found to have hemorrhoids, CT scan was completed and revealed a large stone in the RIGHT UPJ with associated hydronephrosis.           Subjective     Past Medical History  Past Medical History:   Diagnosis Date    Cancer ([address]     cervical cancer    Cancer ([facility] [address]     skin cancer    Ill-defined condition     lyme disease       Past Surgical History:   Past Surgical History:   Procedure Laterality Date    HX GI      isaias colectomy    HX GYN      partial hysterectomy    HX GYN      c section    HX ORTHOPAEDIC      RIGHT shoulder surgery    HX ORTHOPAEDIC      RIGHT foot surgery    NEUROLOGICAL PROCEDURE UNLISTED      lumber surgery       Medication:  Current Outpatient Medications   Medication Sig Dispense Refill    calcium carbonate (TUMS) 200 mg calcium (500 mg) chew Take 1 Tab by mouth four (4) times daily as needed. 30 Tab 0    atorvastatin (LIPITOR) 20 mg tablet Take 1 Tab by mouth nightly. 30 Tab 0    aspirin 81 mg chewable tablet Take 1 Tab by mouth daily. 30 Tab 0    ergocalciferol (VITAMIN D2) 50,000 unit capsule Take 50,000 Units by mouth.  ipratropium-albuterol (COMBIVENT RESPIMAT)  mcg/actuation inhaler Take 1 Puff by inhalation every six (6) hours. Allergies: Allergies   Allergen Reactions    [de-identified] A500 [Propoxyphene N-Acetaminophen] Shortness of Breath    Iodine Hives    Seafood Hives    Sulfa (Sulfonamide Antibiotics) Other (comments)       Social History:  Social History     Tobacco Use    Smoking status: Current Every Day Smoker     Packs/day: 1.00    Smokeless tobacco: Never Used   Substance Use Topics    Alcohol use: Yes     Alcohol/week: 10.0 standard drinks     Types: 10 Shots of liquor per week    Drug use: Not on file       Family History  No family history on file.     Review of Systems  ROS negative except HPI    Objective:     Vital signs in last 24 hours:  Visit Vitals  /83   Pulse 74   Temp 97.8 °F (36.6 °C)   Resp 19   Ht 5' 1\" (1.549 m)   Wt 58.1 kg (128 lb)   SpO2 95%   BMI 24.19 kg/m²       Intake/Output last 3 shifts:        Physical Exam  General: NAD, A&O  HEENT: atraumatic  Cardiac: no distress  Pulmonary: Normal work of breathing   Abdomen: soft, NTTP, nondistended, mild suprapubic fullness or tenderness  : voiding, no CVA tenderness  Neuro: Appropriate, no focal neurological deficits  Mood/Affect: normal    Lab/Imaging Review:       Most Recent Labs:  Lab Results   Component Value Date/Time    WBC 6.1 05/26/2021 11:13 AM    HGB 15.6 05/26/2021 11:13 AM    HCT 45.4 05/26/2021 11:13 AM    PLATELET 930 26/06/8149 11:13 AM    MCV 94.4 05/26/2021 11:13 AM        Lab Results   Component Value Date/Time    Sodium 140 05/26/2021 11:13 AM    Potassium 4.0 05/26/2021 11:13 AM    Chloride 109 (H) 05/26/2021 11:13 AM    CO2 23 05/26/2021 11:13 AM    Anion gap 8 05/26/2021 11:13 AM    Glucose 89 05/26/2021 11:13 AM    BUN 22 (H) 05/26/2021 11:13 AM    Creatinine 1.10 (H) 05/26/2021 11:13 AM    BUN/Creatinine ratio 20 05/26/2021 11:13 AM    GFR est AA >60 05/26/2021 11:13 AM    GFR est non-AA 50 (L) 05/26/2021 11:13 AM    Calcium 8.9 05/26/2021 11:13 AM    Bilirubin, total 0.4 05/26/2021 11:13 AM    Alk. phosphatase 76 05/26/2021 11:13 AM    Protein, total 7.6 05/26/2021 11:13 AM    Albumin 3.7 05/26/2021 11:13 AM    Globulin 3.9 05/26/2021 11:13 AM    A-G Ratio 0.9 (L) 05/26/2021 11:13 AM    ALT (SGPT) 43 05/26/2021 11:13 AM        No results found for: PSA, PSA2, PSAR1, Eulas Lillie, PSAR3, [id number], OFW117002, 52919, PSAEXT     COAGS:    Lab Results   Component Value Date/Time    PTP 10.3 05/26/2021 11:47 AM    INR 1.0 05/26/2021 11:47 AM       Lab Results   Component Value Date/Time    Hemoglobin A1c 5.5 10/15/2017 02:24 PM        Lab Results   Component Value Date/Time    Troponin-I, Qt. <0.05 05/26/2021 11:13 AM          Urine/Blood Cultures:  Results     Procedure Component Value Units Date/Time    URINE CULTURE HOLD SAMPLE [[id number]] Collected: 05/26/21 1238    Order Status: Completed Specimen: Urine from Serum Updated: 05/26/21 1241     Urine culture hold       Urine on hold in Microbiology dept for 2 days. If unpreserved urine is submitted, it cannot be used for addtional testing after 24 hours, recollection will be required.           CULTURE, URINE [[id number]] Collected: 05/26/21 1238    Order Status: Completed Specimen: Urine from Clean catch Updated: 05/26/21 1326             IMAGING:  CT ABD PELV WO CONT    Result Date: 5/26/2021  EXAM: CT ABD PELV WO CONT INDICATION: lower abd pain, and BRBPR, allergic to contrast COMPARISON: 2017 CONTRAST:  None. TECHNIQUE: Thin axial images were obtained through the abdomen and pelvis. Coronal and sagittal reformats were generated. Oral contrast was not administered. CT dose reduction was achieved through use of a standardized protocol tailored for this examination and automatic exposure control for dose modulation. The absence of intravenous contrast material reduces the sensitivity for evaluation of the vasculature and solid organs. FINDINGS: LOWER THORAX: No significant abnormality in the incidentally imaged lower chest. There are changes of emphysema LIVER: No mass. BILIARY TREE: Gallbladder is within normal limits. CBD is not dilated. SPLEEN: within normal limits. PANCREAS: No focal abnormality. ADRENALS: Unremarkable. Slight fullness left adrenal gland is unchanged KIDNEYS/URETERS: There is a 1.5 cm calculus right UPJ with moderately severe right hydronephrosis. There is a nonobstructing calculus left kidney. STOMACH: Unremarkable. SMALL BOWEL: No dilatation or wall thickening. COLON: No dilatation or wall thickening. Postoperative changes are noted left colon APPENDIX: Normal PERITONEUM: No ascites or pneumoperitoneum. RETROPERITONEUM: No lymphadenopathy or aortic aneurysm. Atherosclerotic changes are noted. REPRODUCTIVE ORGANS: Patient is status post hysterectomy URINARY BLADDER: No mass or calculus. BONES: No destructive bone lesion. ABDOMINAL WALL: No mass or hernia. ADDITIONAL COMMENTS: N/A     1. There is a 1.5 cm calculus right UPJ with moderately severe right hydronephrosis. 2. There is a small nonobstructive calculus left kidney.           Signed By: Magdalena Barragan NP  - May 26, 2021

## 2021-05-26 NOTE — PROGRESS NOTES
CARE MANAGEMENT INITIAL ASSESSEMENT      NAME:   Mikey Beltran   :     1955   MRN:     [id number]       Emergency Contact:  Extended Emergency Contact Information  Primary Emergency Contact: [address] Phone: [phone]  Relation: Child    Advance Directive:  Prior, Pt denies having an advance directive. Healthcare Decision Maker:   Patricia Lozano mumtaz- [phone]    Reason for Admission:  Ms. Jean Marie Coles is a 78 y.o. female with history that includes cancer admitted for an emergent admission for:  nephrolithiasis    Patient Active Problem List   Diagnosis Code    Nephrolithiasis N20.0    Hydronephrosis N13.30    Flank pain R10.9    Hematuria R31.9    Renal insufficiency N28.9    BRBPR (bright red blood per rectum) K62.5       Assessment: In person with patient    RUR:  N/A OBS  Risk Level:  N/A  Value-based purchasing:  no  Bundle patient:  No    Residency: Private residence  Exterior Steps:  5  Interior Steps:  None    Lives With: Alone    Prior functioning:  Independent  Pt requires assistance with: N/A    Prior DME required:  [x]None  []RW  []Cane  []Crutches  []Bedside commode  []CPAP  []Home O2 (Liters/Provider: )  []Nebulizer   []Shower Chair  []Wheelchair  []Hospital Bed  []Miguel  []Stair lift  []Rollator  []Other:    DME available:  [x]None  []RW  []Cane  []Crutches  []Bedside commode  []CPAP  []Home O2 (Liter/Provider: )  []Nebulizer  []Shower Chair  []Wheelchair  []Hospital Bed  []Miguel  []Stair lift  []Rollator  []Other:    Rehab history:  None     Discharge Concerns:  No    Insurer:  Payor: [place] MEDICARE / Plan: VA MEDICARE PART A & B / Product Type: Medicare /      Observation notice provided in writing to patient and/or caregiver as well as verbal explanation of the policy. Patients who are in outpatient status also receive the Observation notice.   Patient has received notice and or patient representative has received via secure email, fax, or certified mail based on patient representative's preference. PCP: [facility]   Name of Practice: [facility]   Current patient: yes   Approximate date of last visit: 1 month ago   Access to virtual PCP visits: yes    Pharmacy:  [address] [place]. Pt denies any problems obtaining/taking medications. DC Transport:  friend      Transition of care plan:   Home with outpatient follow-up      Comments:   CM completed initial assessment with Pt. Pt states that she lives at home alone in [place]. Pt is in [place] visiting a friend. Her friends address is [address] [facility], [address] [place] [address]. Pt has no hx of HH, home O2, or equipment. Pt is independent with ADLs and ambulation. Pt denies problems with either. Discharge plan is for Pt to return home with her friend. Pt states her friend will transport her home. No needs identified at this time. _____________________________________  NICKO Santiago - Care Management  5/26/2021   4:08 PM      Care Management Interventions  PCP Verified by CM: Yes ([facility])  Last Visit to PCP: 04/13/21  Mode of Transport at Discharge:  Other (see comment) (friend)  Transition of Care Consult (CM Consult): Discharge Planning  MyChart Signup: No  Discharge Durable Medical Equipment: No  Physical Therapy Consult: No  Occupational Therapy Consult: No  Speech Therapy Consult: No  Current Support Network: Lives Alone, Own Home  Confirm Follow Up Transport: Family  Discharge Location  Discharge Placement: Home with outpatient services

## 2021-05-26 NOTE — ED TRIAGE NOTES
Pt ambulatory to triage for rectal bleeding after BM x 2 weeks. Reports small clot last night. Sharp RLQ pain and nausea.

## 2021-05-26 NOTE — PROGRESS NOTES
Admission Medication Reconciliation:     Information obtained from:    Patient via interview in ER 9  RxQuery data available¹:  YES    Comments/Recommendations:   Patient able to confirm name, , allergies, and preferred pharmacy  Updated PTA medication list       ¹RxQuery pharmacy benefit data reflects medications filled and processed through the patient's insurance, however   this data does NOT capture whether the medication was picked up or is currently being taken by the patient. Prior to Admission Medications   Prescriptions Last Dose Informant Taking?   aspirin 81 mg chewable tablet 2021 at Unknown time  Yes   Sig: Take 1 Tab by mouth daily. cholecalciferol (Vitamin D3) (1000 Units /25 mcg) tablet 2021 at Unknown time  Yes   Sig: Take 1,000 Units by mouth daily. Facility-Administered Medications: None         Please contact the main inpatient pharmacy with any questions or concerns at [phone] and we will direct you to the clinical pharmacist covering this patient's care while in-house.    Richard Blum, JiaD, BCPS

## 2021-05-26 NOTE — ED PROVIDER NOTES
76-year-old female with a history of cervical cancer, skin cancer, IBS, hemorrhoids, and prior kidney stones, with history of prior hemicolectomy after an accidental perforation during a colonoscopy, presents to the emergency department complaining of a 2-week history of sharp right lower quadrant abdominal pain with nausea and dry heaving. She also notes associated dysuria and urinary urgency but no frequency. She additionally states that she has been having bright red blood per rectum for the last 4 months intermittently but seemingly increasing over the last couple of weeks. She states that she has IBS and has occasional diarrhea and blood tingeing within the stool. She states that last night she noted a small blood clot when using the bathroom but otherwise states that the blood has always been bright red blood and denies any melena. She denies any history of significant prior GI bleeds, on aspirin but no other blood thinners. She denies any chest pain, shortness of breath, fever, chills, lightheadedness, syncope, or any other medical concerns. She rates her pain currently as about a 5/10. Past Medical History:   Diagnosis Date    Cancer [facility])     cervical cancer    Cancer ([facility] [address]     skin cancer    Ill-defined condition     lyme disease       Past Surgical History:   Procedure Laterality Date    HX GI      isaias colectomy    HX GYN      partial hysterectomy    HX GYN      c section    HX ORTHOPAEDIC      RIGHT shoulder surgery    HX ORTHOPAEDIC      RIGHT foot surgery    NEUROLOGICAL PROCEDURE UNLISTED      lumber surgery         No family history on file.     Social History     Socioeconomic History    Marital status: SINGLE     Spouse name: Not on file    Number of children: Not on file    Years of education: Not on file    Highest education level: Not on file   Occupational History    Not on file   Tobacco Use    Smoking status: Current Every Day Smoker     Packs/day: 1.00    Smokeless tobacco: Never Used   Substance and Sexual Activity    Alcohol use: Yes     Alcohol/week: 10.0 standard drinks     Types: 10 Shots of liquor per week    Drug use: Not on file    Sexual activity: Not on file   Other Topics Concern    Not on file   Social History Narrative    Not on file     Social Determinants of Health     Financial Resource Strain:     Difficulty of Paying Living Expenses:    Food Insecurity:     Worried About Running Out of Food in the Last Year:     [address] in the Last Year:    Transportation Needs:     Lack of Transportation (Medical):  Lack of Transportation (Non-Medical):    Physical Activity:     Days of Exercise per Week:     Minutes of Exercise per Session:    Stress:     Feeling of Stress :    Social Connections:     Frequency of Communication with Friends and Family:     Frequency of Social Gatherings with Friends and Family:     Attends [employer] Services:     Active Member of Clubs or Organizations:     Attends Club or Organization Meetings:     Marital Status:    Intimate Partner Violence:     Fear of Current or Ex-Partner:     Emotionally Abused:     Physically Abused:     Sexually Abused: ALLERGIES: Darvocet a500 [propoxyphene n-acetaminophen], Iodine, Seafood, and Sulfa (sulfonamide antibiotics)    Review of Systems   Constitutional: Positive for activity change and appetite change. Negative for chills and fever. HENT: Negative for congestion, rhinorrhea, sinus pressure, sneezing and sore throat. Eyes: Negative for photophobia and visual disturbance. Respiratory: Negative for cough and shortness of breath. Cardiovascular: Negative for chest pain. Gastrointestinal: Positive for abdominal pain, anal bleeding, blood in stool, diarrhea, nausea and vomiting. Negative for constipation. Genitourinary: Positive for dysuria, flank pain, hematuria and urgency.  Negative for difficulty urinating, frequency, menstrual problem, vaginal bleeding and vaginal discharge. Musculoskeletal: Negative for arthralgias, back pain, myalgias and neck pain. Skin: Negative for rash and wound. Neurological: Negative for syncope, weakness, numbness and headaches. Psychiatric/Behavioral: Negative for self-injury and suicidal ideas. All other systems reviewed and are negative. Vitals:    05/26/21 1025 05/26/21 1044 05/26/21 1215   BP: 136/82  126/83   Pulse: 83  74   Resp: 16  19   Temp: 97.8 °F (36.6 °C)     SpO2: 97% 97% 95%   Weight: 58.1 kg (128 lb)     Height: 5' 1\" (1.549 m)              Physical Exam  Vitals and nursing note reviewed. Constitutional:       General: She is not in acute distress. Appearance: Normal appearance. She is well-developed. She is not diaphoretic. Comments: Pleasant, no acute distress. HENT:      Head: Normocephalic and atraumatic. Nose: Nose normal.   Eyes:      Extraocular Movements: Extraocular movements intact. Conjunctiva/sclera: Conjunctivae normal.      Pupils: Pupils are equal, round, and reactive to light. Cardiovascular:      Rate and Rhythm: Normal rate and regular rhythm. Heart sounds: Normal heart sounds. Pulmonary:      Effort: Pulmonary effort is normal.      Breath sounds: Normal breath sounds. Abdominal:      General: There is no distension. Palpations: Abdomen is soft. Tenderness: There is abdominal tenderness in the right lower quadrant and suprapubic area. There is no right CVA tenderness, left CVA tenderness, guarding or rebound. Genitourinary:     Rectum: Guaiac result positive (Occult positive but no edwin blood on exam). External hemorrhoid present. Musculoskeletal:         General: No tenderness. Cervical back: Neck supple. Skin:     General: Skin is warm and dry. Neurological:      General: No focal deficit present. Mental Status: She is alert and oriented to person, place, and time.       Cranial Nerves: No cranial nerve deficit. Sensory: No sensory deficit. Motor: No weakness. Coordination: Coordination normal.          MDM   58-year-old female presents with right lower quadrant abdominal pain with nausea vomiting s bright red blood per rectum. Exam as above noting external hemorrhoid, possible source of bleeding. Patient is allergic to contrast dye so limited on ability to do bleeding scan. She is otherwise afebrile with vital signs stable no acute distress. Labs returned showing no significant abnormalities, no leukocytosis, creatinine 1.1, BUN 22, negative troponin and normal lipase. CT abdomen pelvis shows a 1.5 cm calculus at the right UPJ with moderately severe right hydronephrosis. UA shows moderate blood with moderate leuk esterase with  WBCs. Sent for urine culture. Concern for infected stone given the duration of her symptoms for 2 weeks and concerning urinalysis. Will give dose of IV Rocephin. Urology was consulted, Dr. Danielle Neil who saw the patient at the bedside and recommended admission to the hospitalist service for further care and operative management tomorrow. Procedures    Perfect Serve Consult for Admission  2:40 PM    ED Room Number: ER09/09  Patient Name and age:  Manjeet Fontenot 78 y.o.  female  Working Diagnosis:   1. Kidney stone    2. Urinary tract infection with hematuria, site unspecified    3. Bleeding hemorrhoid    4. Right flank pain        COVID-19 Suspicion:  no  Sepsis present:  no  Reassessment needed: no  Code Status:  Full Code  Readmission: no  Isolation Requirements:  no  Recommended Level of Care:  med/surg  Department:[facility] ED - [phone]  Other: 58-year-old female presents with 2-week history of right flank and right lower quadrant abdominal pain with associated nausea and intermittent vomiting and dysuria. Also notes a 4-month history of waxing waning bright red blood per rectum.   Exam suggestive more of bleeding hemorrhoid as etiology for bleeding. CT abdomen pelvis shows large kidney stone. Urology consulted as urine looks infected. Getting IV Rocephin and urology plans for operative management tomorrow. N.p.o. at midnight.

## 2021-05-27 ENCOUNTER — ANESTHESIA (OUTPATIENT)
Dept: SURGERY | Age: 66
End: 2021-05-27
Payer: MEDICARE

## 2021-05-27 ENCOUNTER — APPOINTMENT (OUTPATIENT)
Dept: GENERAL RADIOLOGY | Age: 66
End: 2021-05-27
Attending: UROLOGY
Payer: MEDICARE

## 2021-05-27 ENCOUNTER — ANESTHESIA EVENT (OUTPATIENT)
Dept: SURGERY | Age: 66
End: 2021-05-27
Payer: MEDICARE

## 2021-05-27 LAB
ALBUMIN SERPL-MCNC: 3.1 G/DL (ref 3.5–5)
ALBUMIN/GLOB SERPL: 0.9 {RATIO} (ref 1.1–2.2)
ALP SERPL-CCNC: 66 U/L (ref 45–117)
ALT SERPL-CCNC: 33 U/L (ref 12–78)
ANION GAP SERPL CALC-SCNC: 2 MMOL/L (ref 5–15)
AST SERPL-CCNC: 19 U/L (ref 15–37)
BACTERIA SPEC CULT: NORMAL
BILIRUB SERPL-MCNC: 0.4 MG/DL (ref 0.2–1)
BUN SERPL-MCNC: 28 MG/DL (ref 6–20)
BUN/CREAT SERPL: 24 (ref 12–20)
CALCIUM SERPL-MCNC: 8.2 MG/DL (ref 8.5–10.1)
CHLORIDE SERPL-SCNC: 111 MMOL/L (ref 97–108)
CO2 SERPL-SCNC: 28 MMOL/L (ref 21–32)
COVID-19 RAPID TEST, COVR: NOT DETECTED
CREAT SERPL-MCNC: 1.18 MG/DL (ref 0.55–1.02)
ERYTHROCYTE [DISTWIDTH] IN BLOOD BY AUTOMATED COUNT: 12.4 % (ref 11.5–14.5)
GLOBULIN SER CALC-MCNC: 3.3 G/DL (ref 2–4)
GLUCOSE SERPL-MCNC: 85 MG/DL (ref 65–100)
HCT VFR BLD AUTO: 42.6 % (ref 35–47)
HGB BLD-MCNC: 13.8 G/DL (ref 11.5–16)
MAGNESIUM SERPL-MCNC: 2.3 MG/DL (ref 1.6–2.4)
MCH RBC QN AUTO: 31.3 PG (ref 26–34)
MCHC RBC AUTO-ENTMCNC: 32.4 G/DL (ref 30–36.5)
MCV RBC AUTO: 96.6 FL (ref 80–99)
NRBC # BLD: 0 K/UL (ref 0–0.01)
NRBC BLD-RTO: 0 PER 100 WBC
PLATELET # BLD AUTO: 211 K/UL (ref 150–400)
PMV BLD AUTO: 11.1 FL (ref 8.9–12.9)
POTASSIUM SERPL-SCNC: 4.2 MMOL/L (ref 3.5–5.1)
PROT SERPL-MCNC: 6.4 G/DL (ref 6.4–8.2)
RBC # BLD AUTO: 4.41 M/UL (ref 3.8–5.2)
SERVICE CMNT-IMP: NORMAL
SODIUM SERPL-SCNC: 141 MMOL/L (ref 136–145)
SOURCE, COVRS: NORMAL
WBC # BLD AUTO: 6.1 K/UL (ref 3.6–11)

## 2021-05-27 PROCEDURE — 74011250636 HC RX REV CODE- 250/636: Performed by: UROLOGY

## 2021-05-27 PROCEDURE — 36415 COLL VENOUS BLD VENIPUNCTURE: CPT

## 2021-05-27 PROCEDURE — 99218 HC RM OBSERVATION: CPT

## 2021-05-27 PROCEDURE — 77030040922 HC BLNKT HYPOTHRM STRY -A

## 2021-05-27 PROCEDURE — 76010000138 HC OR TIME 0.5 TO 1 HR: Performed by: UROLOGY

## 2021-05-27 PROCEDURE — 2709999900 HC NON-CHARGEABLE SUPPLY: Performed by: UROLOGY

## 2021-05-27 PROCEDURE — 77030018832 HC SOL IRR H20 ICUM -A: Performed by: UROLOGY

## 2021-05-27 PROCEDURE — 80053 COMPREHEN METABOLIC PANEL: CPT

## 2021-05-27 PROCEDURE — 74011250636 HC RX REV CODE- 250/636: Performed by: REGISTERED NURSE

## 2021-05-27 PROCEDURE — 77030019927 HC TBNG IRR CYSTO BAXT -A: Performed by: UROLOGY

## 2021-05-27 PROCEDURE — C1758 CATHETER, URETERAL: HCPCS | Performed by: UROLOGY

## 2021-05-27 PROCEDURE — 74011000250 HC RX REV CODE- 250: Performed by: UROLOGY

## 2021-05-27 PROCEDURE — 85027 COMPLETE CBC AUTOMATED: CPT

## 2021-05-27 PROCEDURE — 74011250636 HC RX REV CODE- 250/636: Performed by: INTERNAL MEDICINE

## 2021-05-27 PROCEDURE — 87635 SARS-COV-2 COVID-19 AMP PRB: CPT

## 2021-05-27 PROCEDURE — 77030040361 HC SLV COMPR DVT MDII -B

## 2021-05-27 PROCEDURE — 83735 ASSAY OF MAGNESIUM: CPT

## 2021-05-27 PROCEDURE — 96376 TX/PRO/DX INJ SAME DRUG ADON: CPT

## 2021-05-27 PROCEDURE — 76060000032 HC ANESTHESIA 0.5 TO 1 HR: Performed by: UROLOGY

## 2021-05-27 PROCEDURE — C2617 STENT, NON-COR, TEM W/O DEL: HCPCS | Performed by: UROLOGY

## 2021-05-27 PROCEDURE — 76210000016 HC OR PH I REC 1 TO 1.5 HR: Performed by: UROLOGY

## 2021-05-27 PROCEDURE — 74011250637 HC RX REV CODE- 250/637: Performed by: UROLOGY

## 2021-05-27 PROCEDURE — 74011250636 HC RX REV CODE- 250/636: Performed by: STUDENT IN AN ORGANIZED HEALTH CARE EDUCATION/TRAINING PROGRAM

## 2021-05-27 PROCEDURE — 74011250637 HC RX REV CODE- 250/637: Performed by: INTERNAL MEDICINE

## 2021-05-27 PROCEDURE — 74011000250 HC RX REV CODE- 250: Performed by: REGISTERED NURSE

## 2021-05-27 DEVICE — URETERAL STENT
Type: IMPLANTABLE DEVICE | Site: URETER | Status: FUNCTIONAL
Brand: CONTOUR™

## 2021-05-27 RX ORDER — MORPHINE SULFATE 4 MG/ML
2 INJECTION INTRAVENOUS ONCE
Status: COMPLETED | OUTPATIENT
Start: 2021-05-27 | End: 2021-05-27

## 2021-05-27 RX ORDER — FENTANYL CITRATE 50 UG/ML
INJECTION, SOLUTION INTRAMUSCULAR; INTRAVENOUS AS NEEDED
Status: DISCONTINUED | OUTPATIENT
Start: 2021-05-27 | End: 2021-05-27 | Stop reason: HOSPADM

## 2021-05-27 RX ORDER — SODIUM CHLORIDE, SODIUM LACTATE, POTASSIUM CHLORIDE, CALCIUM CHLORIDE 600; 310; 30; 20 MG/100ML; MG/100ML; MG/100ML; MG/100ML
50 INJECTION, SOLUTION INTRAVENOUS CONTINUOUS
Status: DISCONTINUED | OUTPATIENT
Start: 2021-05-27 | End: 2021-05-28 | Stop reason: HOSPADM

## 2021-05-27 RX ORDER — ONDANSETRON 2 MG/ML
INJECTION INTRAMUSCULAR; INTRAVENOUS AS NEEDED
Status: DISCONTINUED | OUTPATIENT
Start: 2021-05-27 | End: 2021-05-27 | Stop reason: HOSPADM

## 2021-05-27 RX ORDER — KETOROLAC TROMETHAMINE 30 MG/ML
INJECTION, SOLUTION INTRAMUSCULAR; INTRAVENOUS AS NEEDED
Status: DISCONTINUED | OUTPATIENT
Start: 2021-05-27 | End: 2021-05-27 | Stop reason: HOSPADM

## 2021-05-27 RX ORDER — PROPOFOL 10 MG/ML
INJECTION, EMULSION INTRAVENOUS AS NEEDED
Status: DISCONTINUED | OUTPATIENT
Start: 2021-05-27 | End: 2021-05-27 | Stop reason: HOSPADM

## 2021-05-27 RX ORDER — MIDAZOLAM HYDROCHLORIDE 1 MG/ML
INJECTION, SOLUTION INTRAMUSCULAR; INTRAVENOUS AS NEEDED
Status: DISCONTINUED | OUTPATIENT
Start: 2021-05-27 | End: 2021-05-27 | Stop reason: HOSPADM

## 2021-05-27 RX ORDER — DEXAMETHASONE SODIUM PHOSPHATE 4 MG/ML
INJECTION, SOLUTION INTRA-ARTICULAR; INTRALESIONAL; INTRAMUSCULAR; INTRAVENOUS; SOFT TISSUE AS NEEDED
Status: DISCONTINUED | OUTPATIENT
Start: 2021-05-27 | End: 2021-05-27 | Stop reason: HOSPADM

## 2021-05-27 RX ORDER — LIDOCAINE HYDROCHLORIDE 20 MG/ML
INJECTION, SOLUTION EPIDURAL; INFILTRATION; INTRACAUDAL; PERINEURAL AS NEEDED
Status: DISCONTINUED | OUTPATIENT
Start: 2021-05-27 | End: 2021-05-27 | Stop reason: HOSPADM

## 2021-05-27 RX ADMIN — MORPHINE SULFATE 2 MG: 2 INJECTION, SOLUTION INTRAMUSCULAR; INTRAVENOUS at 20:10

## 2021-05-27 RX ADMIN — CEFAZOLIN SODIUM 2 G: 1 POWDER, FOR SOLUTION INTRAMUSCULAR; INTRAVENOUS at 14:44

## 2021-05-27 RX ADMIN — LIDOCAINE HYDROCHLORIDE 100 MG: 20 INJECTION, SOLUTION EPIDURAL; INFILTRATION; INTRACAUDAL; PERINEURAL at 14:31

## 2021-05-27 RX ADMIN — FENTANYL CITRATE 50 MCG: 0.05 INJECTION, SOLUTION INTRAMUSCULAR; INTRAVENOUS at 14:31

## 2021-05-27 RX ADMIN — FAMOTIDINE 20 MG: 20 TABLET ORAL at 17:59

## 2021-05-27 RX ADMIN — MORPHINE SULFATE 2 MG: 2 INJECTION, SOLUTION INTRAMUSCULAR; INTRAVENOUS at 02:48

## 2021-05-27 RX ADMIN — PROPOFOL 100 MG: 10 INJECTION, EMULSION INTRAVENOUS at 14:31

## 2021-05-27 RX ADMIN — MORPHINE SULFATE 2 MG: 4 INJECTION, SOLUTION INTRAMUSCULAR; INTRAVENOUS at 12:28

## 2021-05-27 RX ADMIN — MIDAZOLAM HYDROCHLORIDE 2 MG: 2 INJECTION, SOLUTION INTRAMUSCULAR; INTRAVENOUS at 14:20

## 2021-05-27 RX ADMIN — DEXTROSE MONOHYDRATE, SODIUM CHLORIDE, AND POTASSIUM CHLORIDE 75 ML/HR: 50; 4.5; 1.49 INJECTION, SOLUTION INTRAVENOUS at 17:59

## 2021-05-27 RX ADMIN — KETOROLAC TROMETHAMINE 15 MG: 30 INJECTION INTRAMUSCULAR; INTRAVENOUS at 14:52

## 2021-05-27 RX ADMIN — ONDANSETRON HYDROCHLORIDE 4 MG: 2 SOLUTION INTRAMUSCULAR; INTRAVENOUS at 14:47

## 2021-05-27 RX ADMIN — FAMOTIDINE 20 MG: 20 TABLET ORAL at 09:28

## 2021-05-27 RX ADMIN — MORPHINE SULFATE 2 MG: 2 INJECTION, SOLUTION INTRAMUSCULAR; INTRAVENOUS at 16:10

## 2021-05-27 RX ADMIN — DEXAMETHASONE SODIUM PHOSPHATE 4 MG: 4 INJECTION, SOLUTION INTRAMUSCULAR; INTRAVENOUS at 14:47

## 2021-05-27 RX ADMIN — SODIUM CHLORIDE, POTASSIUM CHLORIDE, SODIUM LACTATE AND CALCIUM CHLORIDE 50 ML/HR: 600; 310; 30; 20 INJECTION, SOLUTION INTRAVENOUS at 12:30

## 2021-05-27 RX ADMIN — MORPHINE SULFATE 2 MG: 2 INJECTION, SOLUTION INTRAMUSCULAR; INTRAVENOUS at 07:00

## 2021-05-27 NOTE — PERIOP NOTES
Called to get report but  said nurse is transporting a patient to [facility] and she will call me back.

## 2021-05-27 NOTE — ED NOTES
TRANSFER - OUT REPORT:    Verbal report given to Janet Ansari RN(name) on Helena Stubbs  being transferred to 4th floor(unit) for routine progression of care       Report consisted of patients Situation, Background, Assessment and   Recommendations(SBAR). Information from the following report(s) SBAR, Kardex, ED Summary, [facility] and Recent Results was reviewed with the receiving nurse. Lines:   Peripheral IV 05/26/21 Left Antecubital (Active)   Site Assessment Clean, dry, & intact 05/26/21 1112   Phlebitis Assessment 0 05/26/21 1112   Infiltration Assessment 0 05/26/21 1112   Dressing Status Clean, dry, & intact 05/26/21 1112   Dressing Type Transparent 05/26/21 1112   Hub Color/Line Status Pink 05/26/21 1112        Opportunity for questions and clarification was provided.       Patient transported with:   Monitor  Registered Nurse

## 2021-05-27 NOTE — PROGRESS NOTES
Problem: Falls - Risk of  Goal: *Absence of Falls  Description: Document Osman Noyola Fall Risk and appropriate interventions in the flowsheet.   Outcome: Progressing Towards Goal  Note: Fall Risk Interventions:

## 2021-05-27 NOTE — OP NOTES
Preoperative diagnosis: Right obstructing UPJ stone large  Postoperative diagnosis-same  ________________  OPERATION-  Cystoscopy  Right ureteral stent placement  ________________  Fatuma Salvador M.D. Anes-General  Abx-Ancef 2 g  Asst- None  EBL-none  Specimens-none  Implant- None  Drains- 6 x 24 double-J stent with good proximal distal curl  Findings-normal bladder. No masses. Right stent good position. No contrast/retrograde given iodine contrast concerns  Indications-right symptomatic stone noted during work-up for hematochezia. Large. Will not pass. Counseled for stent placement with subsequent plans for PCNL. PROCEDURE- The patient was brought to the operating room. The patient was prepped and draped in a standard fashion. Timeout performed confirming patient, procedure, laterality (if applicable). Cystoscope in urethra demonstrating normal female urethra. Bladder demonstrating normal mucosa including trigone posterior wall dome bladder walls and bladder neck. Single right and left ureteral orifice. Fluoroscopy demonstrating right UPJ stone. Wire placement up the right ureter with subsequent stone refluxed back into capacious renal pelvis. Wire passed into position with 5 [place] open-ended ear stone. Brisk hydronephrotic drip noted upon wire removal, therefore I opted not to place contrast.  Wire replaced. Over the wire a 6 x 24 double-J stent was placed with good proximal and distal curl. Patient awoken and brought to PACU. Complications- None    DISPO-patient may have a regular diet.   Discharge home up to medical team.  She has scheduled follow-up June 4 with Dr. Violetta Cushing

## 2021-05-27 NOTE — PROGRESS NOTES
Urology Progress Note    Patient: Kevin Worrell MRN: [id number]  SSN: [id number]    YOB: 1955  Age: 78 y.o. Sex: female        Assessment:     Kevin Worrell is a 78 y.o. female admitted to the hospital on 5/26/2021 for 16mm right UPJ stone with hydronephrosis. Plan:     1. Pyuria on UA, culture pending. Continue culture directed abx.  2. 16mm RIGHT UPJ stone. Scheduled for cysto, right stent placement today at 1pm.   3. Right hydronephrosis. As above. 4. Will need outpatient follow up with KUB to discuss definitive stone management. Patient aware and agreeable to plan. Reason For Visit:     Follow up for 16mm right UPJ stone. Interval History:     No acute events overnight. Pain improved with IV pain medication. Has been NPO. Labs reviewed. Urine culture pending. ROS:     Denies fevers  Reports abdominal pain  Denies hematuria, frequency    Objective:     Visit Vitals  /69 (BP 1 Location: Right upper arm, BP Patient Position: At rest)   Pulse 72   Temp 97.9 °F (36.6 °C)   Resp 16   Ht 5' 1\" (1.549 m)   Wt 58.1 kg (128 lb)   SpO2 94%   BMI 24.19 kg/m²         Intake/Output Summary (Last 24 hours) at 5/27/2021 9798  Last data filed at 5/27/2021 5103  Gross per 24 hour   Intake 1047.5 ml   Output 450 ml   Net 597.5 ml       Physical Exam  General: NAD  Respiratory: no distress, room air  Abdomen: soft, mildly tender lower abdomen  : no CVA tenderness  Neuro: Appropriate, no focal neurological deficits    Labs reviewed, May 27, 2021  Recent Results (from the past 24 hour(s))   SAMPLES BEING HELD    Collection Time: 05/26/21 11:13 AM   Result Value Ref Range    SAMPLES BEING HELD 1RED,1SST,1PURPLE AND BLUE BLOOD CULTURE BOTTLE     COMMENT        Add-on orders for these samples will be processed based on acceptable specimen integrity and analyte stability, which may vary by analyte.    CBC WITH AUTOMATED DIFF    Collection Time: 05/26/21 11:13 AM   Result Value Ref Range    WBC 6.1 3.6 - 11.0 K/uL    RBC 4.81 3.80 - 5.20 M/uL    HGB 15.6 11.5 - 16.0 g/dL    HCT 45.4 35.0 - 47.0 %    MCV 94.4 80.0 - 99.0 FL    MCH 32.4 26.0 - 34.0 PG    MCHC 34.4 30.0 - 36.5 g/dL    RDW 12.2 11.5 - 14.5 %    PLATELET 550 648 - 264 K/uL    MPV 11.4 8.9 - 12.9 FL    NRBC 0.0 0  WBC    ABSOLUTE NRBC 0.00 0.00 - 0.01 K/uL    NEUTROPHILS 59 32 - 75 %    LYMPHOCYTES 23 12 - 49 %    MONOCYTES 14 (H) 5 - 13 %    EOSINOPHILS 4 0 - 7 %    BASOPHILS 0 0 - 1 %    IMMATURE GRANULOCYTES 0 0.0 - 0.5 %    ABS. NEUTROPHILS 3.6 1.8 - 8.0 K/UL    ABS. LYMPHOCYTES 1.4 0.8 - 3.5 K/UL    ABS. MONOCYTES 0.9 0.0 - 1.0 K/UL    ABS. EOSINOPHILS 0.2 0.0 - 0.4 K/UL    ABS. BASOPHILS 0.0 0.0 - 0.1 K/UL    ABS. IMM. GRANS. 0.0 0.00 - 0.04 K/UL    DF AUTOMATED     METABOLIC PANEL, COMPREHENSIVE    Collection Time: 05/26/21 11:13 AM   Result Value Ref Range    Sodium 140 136 - 145 mmol/L    Potassium 4.0 3.5 - 5.1 mmol/L    Chloride 109 (H) 97 - 108 mmol/L    CO2 23 21 - 32 mmol/L    Anion gap 8 5 - 15 mmol/L    Glucose 89 65 - 100 mg/dL    BUN 22 (H) 6 - 20 MG/DL    Creatinine 1.10 (H) 0.55 - 1.02 MG/DL    BUN/Creatinine ratio 20 12 - 20      GFR est AA >60 >60 ml/min/1.73m2    GFR est non-AA 50 (L) >60 ml/min/1.73m2    Calcium 8.9 8.5 - 10.1 MG/DL    Bilirubin, total 0.4 0.2 - 1.0 MG/DL    ALT (SGPT) 43 12 - 78 U/L    AST (SGOT) 25 15 - 37 U/L    Alk.  phosphatase 76 45 - 117 U/L    Protein, total 7.6 6.4 - 8.2 g/dL    Albumin 3.7 3.5 - 5.0 g/dL    Globulin 3.9 2.0 - 4.0 g/dL    A-G Ratio 0.9 (L) 1.1 - 2.2     TROPONIN I    Collection Time: 05/26/21 11:13 AM   Result Value Ref Range    Troponin-I, Qt. <0.05 <0.05 ng/mL   LIPASE    Collection Time: 05/26/21 11:13 AM   Result Value Ref Range    Lipase 76 73 - 393 U/L   PROTHROMBIN TIME + INR    Collection Time: 05/26/21 11:47 AM   Result Value Ref Range    INR 1.0 0.9 - 1.1      Prothrombin time 10.3 9.0 - 11.1 sec   OCCULT BLOOD, STOOL Collection Time: 05/26/21 12:00 PM   Result Value Ref Range    Occult blood, stool Positive (A) NEG     URINALYSIS W/MICROSCOPIC    Collection Time: 05/26/21 12:38 PM   Result Value Ref Range    Color YELLOW/STRAW      Appearance HAZY (A) CLEAR      Specific gravity 1.025 1.003 - 1.030      pH (UA) 5.5 5.0 - 8.0      Protein Negative NEG mg/dL    Glucose Negative NEG mg/dL    Ketone Negative NEG mg/dL    Bilirubin Negative NEG      Blood MODERATE (A) NEG      Urobilinogen 0.2 0.2 - 1.0 EU/dL    Nitrites Negative NEG      Leukocyte Esterase MODERATE (A) NEG      WBC  0 - 4 /hpf    RBC 5-10 0 - 5 /hpf    Epithelial cells MODERATE (A) FEW /lpf    Bacteria Negative NEG /hpf   URINE CULTURE HOLD SAMPLE    Collection Time: 05/26/21 12:38 PM    Specimen: Serum; Urine   Result Value Ref Range    Urine culture hold        Urine on hold in Microbiology dept for 2 days. If unpreserved urine is submitted, it cannot be used for addtional testing after 24 hours, recollection will be required. METABOLIC PANEL, COMPREHENSIVE    Collection Time: 05/27/21  2:46 AM   Result Value Ref Range    Sodium 141 136 - 145 mmol/L    Potassium 4.2 3.5 - 5.1 mmol/L    Chloride 111 (H) 97 - 108 mmol/L    CO2 28 21 - 32 mmol/L    Anion gap 2 (L) 5 - 15 mmol/L    Glucose 85 65 - 100 mg/dL    BUN 28 (H) 6 - 20 MG/DL    Creatinine 1.18 (H) 0.55 - 1.02 MG/DL    BUN/Creatinine ratio 24 (H) 12 - 20      GFR est AA 55 (L) >60 ml/min/1.73m2    GFR est non-AA 46 (L) >60 ml/min/1.73m2    Calcium 8.2 (L) 8.5 - 10.1 MG/DL    Bilirubin, total 0.4 0.2 - 1.0 MG/DL    ALT (SGPT) 33 12 - 78 U/L    AST (SGOT) 19 15 - 37 U/L    Alk.  phosphatase 66 45 - 117 U/L    Protein, total 6.4 6.4 - 8.2 g/dL    Albumin 3.1 (L) 3.5 - 5.0 g/dL    Globulin 3.3 2.0 - 4.0 g/dL    A-G Ratio 0.9 (L) 1.1 - 2.2     MAGNESIUM    Collection Time: 05/27/21  2:46 AM   Result Value Ref Range    Magnesium 2.3 1.6 - 2.4 mg/dL   CBC W/O DIFF    Collection Time: 05/27/21  2:46 AM Result Value Ref Range    WBC 6.1 3.6 - 11.0 K/uL    RBC 4.41 3.80 - 5.20 M/uL    HGB 13.8 11.5 - 16.0 g/dL    HCT 42.6 35.0 - 47.0 %    MCV 96.6 80.0 - 99.0 FL    MCH 31.3 26.0 - 34.0 PG    MCHC 32.4 30.0 - 36.5 g/dL    RDW 12.4 11.5 - 14.5 %    PLATELET 424 419 - 772 K/uL    MPV 11.1 8.9 - 12.9 FL    NRBC 0.0 0  WBC    ABSOLUTE NRBC 0.00 0.00 - 0.01 K/uL       Imaging:  CT Results  (Last 48 hours)               05/26/21 1142  CT ABD PELV WO CONT Final result    Impression:      1. There is a 1.5 cm calculus right UPJ with moderately severe right   hydronephrosis. 2. There is a small nonobstructive calculus left kidney. Narrative:  EXAM: CT ABD PELV WO CONT       INDICATION: lower abd pain, and BRBPR, allergic to contrast       COMPARISON: 2017       CONTRAST:  None. TECHNIQUE:    Thin axial images were obtained through the abdomen and pelvis. Coronal and   sagittal reformats were generated. Oral contrast was not administered. CT dose   reduction was achieved through use of a standardized protocol tailored for this   examination and automatic exposure control for dose modulation. The absence of intravenous contrast material reduces the sensitivity for   evaluation of the vasculature and solid organs. FINDINGS:    LOWER THORAX: No significant abnormality in the incidentally imaged lower chest.   There are changes of emphysema   LIVER: No mass. BILIARY TREE: Gallbladder is within normal limits. CBD is not dilated. SPLEEN: within normal limits. PANCREAS: No focal abnormality. ADRENALS: Unremarkable. Slight fullness left adrenal gland is unchanged   KIDNEYS/URETERS: There is a 1.5 cm calculus right UPJ with moderately severe   right hydronephrosis. There is a nonobstructing calculus left kidney. STOMACH: Unremarkable. SMALL BOWEL: No dilatation or wall thickening. COLON: No dilatation or wall thickening.  Postoperative changes are noted left   colon   APPENDIX: Normal PERITONEUM: No ascites or pneumoperitoneum. RETROPERITONEUM: No lymphadenopathy or aortic aneurysm. Atherosclerotic changes   are noted. REPRODUCTIVE ORGANS: Patient is status post hysterectomy   URINARY BLADDER: No mass or calculus. BONES: No destructive bone lesion. ABDOMINAL WALL: No mass or hernia.    ADDITIONAL COMMENTS: N/A                 Signed By: Arnaldo Merrill NP - May 27, 2021

## 2021-05-27 NOTE — ANESTHESIA PREPROCEDURE EVALUATION
Relevant Problems   No relevant active problems       Anesthetic History   No history of anesthetic complications            Review of Systems / Medical History  Patient summary reviewed, nursing notes reviewed and pertinent labs reviewed    Pulmonary  Within defined limits              Comments: Significant smoking hx, quit last 12/20   Neuro/Psych       CVA: no residual symptoms  Headaches     Cardiovascular  Within defined limits                Exercise tolerance: >4 METS     GI/Hepatic/Renal         Renal disease: stones and CRI       Endo/Other  Within defined limits           Other Findings              Physical Exam    Airway  Mallampati: II  TM Distance: 4 - 6 cm    Mouth opening: Normal     Cardiovascular  Regular rate and rhythm,  S1 and S2 normal,  no murmur, click, rub, or gallop             Dental    Dentition: Lower dentition intact and Upper dentition intact     Pulmonary  Breath sounds clear to auscultation               Abdominal         Other Findings            Anesthetic Plan    ASA: 2  Anesthesia type: general          Induction: Intravenous  Anesthetic plan and risks discussed with: Patient

## 2021-05-27 NOTE — PROGRESS NOTES
Problem: Falls - Risk of  Goal: *Absence of Falls  Description: Document Jean Harper Fall Risk and appropriate interventions in the flowsheet.   Outcome: Progressing Towards Goal  Note: Fall Risk Interventions:            Medication Interventions: Teach patient to arise slowly

## 2021-05-27 NOTE — PERIOP NOTES
TRANSFER - OUT REPORT:    Verbal report given to CHANCE Ewing on Uma Covarrubiaslk  being transferred to [phone] for routine post - op       Report consisted of patients Situation, Background, Assessment and   Recommendations(SBAR). Information from the following report(s) SBAR, OR Summary, Procedure Summary, Intake/Output and MAR was reviewed with the receiving nurse. Lines:   Peripheral IV 05/26/21 Left Antecubital (Active)   Site Assessment Clean, dry, & intact 05/27/21 0928   Phlebitis Assessment 0 05/27/21 0928   Infiltration Assessment 0 05/27/21 0928   Dressing Status Clean, dry, & intact 05/27/21 0928   Dressing Type Transparent 05/27/21 0928   Hub Color/Line Status Pink; Infusing 05/27/21 6285   Action Taken Open ports on tubing capped 05/27/21 0928   Alcohol Cap Used Yes 05/27/21 1545        Opportunity for questions and clarification was provided.       Patient transported with:   Registered Nurse

## 2021-05-27 NOTE — PROGRESS NOTES
Shaan Egan [facility] [place] [address]  [address] [facility], [place], [id number] [place] [address]  [phone]      Medical Progress Note      NAME:         Luis Gallagher   :        1955  MRM:        [id number]    Date of service: 2021      Subjective: Patient has been seen and examined as a follow up for multiple medical issues. Chart, labs, diagnostics reviewed. She denies any blood in stool at this time. Occurs when she wipes herself. Has a right aching flank pain. No fever or chills    Objective:    Vital Signs:    Visit Vitals  /72 (BP 1 Location: Right upper arm, BP Patient Position: At rest)   Pulse 77   Temp 98 °F (36.7 °C)   Resp 17   Ht 5' 1\" (1.549 m)   Wt 58.1 kg (128 lb)   SpO2 94%   BMI 24.19 kg/m²          Intake/Output Summary (Last 24 hours) at 2021 1203  Last data filed at 2021 1130  Gross per 24 hour   Intake 1047.5 ml   Output 750 ml   Net 297.5 ml        Physical Examination:    General:   Well looking patient in no acute distress  Eyes:   pink conjunctivae, PERRLA with no discharge. ENT:   no ottorrhea or rhinorrhea with dry mucous membranes  Neck: no masses, thyroid non-tender and trachea central.  Pulm:  clear breath sounds without crackles or wheezes  Card:  has regular and normal S1, S2 without thrills, bruits or peripheral edema  Abd:  Soft, non-tender, non-distended, normoactive bowel sounds   Musc:  No cyanosis, clubbing, atrophy or deformities. Skin:  No rashes, bruising or ulcers. Neuro: Awake and alert.  Generally a non focal exam. Follows commands appropriately  Psych:  Has a good insight and is oriented x 3    Current Facility-Administered Medications   Medication Dose Route Frequency    acetaminophen (TYLENOL) tablet 650 mg  650 mg Oral Q6H PRN    Or    acetaminophen (TYLENOL) suppository 650 mg  650 mg Rectal Q6H PRN    polyethylene glycol (MIRALAX) packet 17 g  17 g Oral DAILY PRN    ondansetron (ZOFRAN ODT) tablet 4 mg  4 mg Oral Q8H PRN    Or    ondansetron (ZOFRAN) injection 4 mg  4 mg IntraVENous Q6H PRN    famotidine (PEPCID) tablet 20 mg  20 mg Oral BID    dextrose 5% - 0.45% NaCl with KCl 20 mEq/L infusion  75 mL/hr IntraVENous CONTINUOUS    morphine injection 2 mg  2 mg IntraVENous Q4H PRN        Laboratory data and review:    Recent Labs     05/27/21  0246 05/26/21  1113   WBC 6.1 6.1   HGB 13.8 15.6   HCT 42.6 45.4    241     Recent Labs     05/27/21  0246 05/26/21  1147 05/26/21  1113     --  140   K 4.2  --  4.0   *  --  109*   CO2 28  --  23   GLU 85  --  89   BUN 28*  --  22*   CREA 1.18*  --  1.10*   CA 8.2*  --  8.9   MG 2.3  --   --    ALB 3.1*  --  3.7   ALT 33  --  43   INR  --  1.0  --      No components found for: [place]    Diagnostics: Imaging studies have been reviewed    Assessment and Plan:    Nephrolithiasis (5/26/2021) / Hydronephrosis (5/26/2021) / Flank pain (5/26/2021) / Hematuria (5/26/2021) POA: CT scan abdomen showed a 1.5 cm calculous right UPJ with hydro. UA unremarkable. Seen by Urology and she is scheduled for a cystoscopy and stent placement today. BRBPR (bright red blood per rectum) (5/26/2021) POA: likely hemorrhoidal. Last colonoscopy done 6 years ago and she was told she had an 'infection in her colon'. She will need a Gi review and possible repeat scope once she has recovered from current illness.  Advised her to arrange follow up    Total time spent for the patient's care: [address] discussed with: Patient, Care Manager and Nursing Staff    Discussed:  Care Plan and D/C Planning    Prophylaxis:  SCD's    Anticipated Disposition:  Home w/Family           ___________________________________________________    Attending Physician:   Onesimo Bloch, MD

## 2021-05-27 NOTE — BRIEF OP NOTE
Brief Postoperative Note    Patient: Katie Abrams  YOB: 1955  MRN: [id number]    Date of Procedure: 5/27/2021     Pre-Op Diagnosis: RIGHT Kidney Stone    Post-Op Diagnosis:  SAME    Procedure(s):  CYSTOSCOPY URETERAL STENT INSERTION RETROGRADE RIGHT STENT PLACEMENT    Surgeon(s):  Heidi Lan MD    Surgical Assistant: None    Anesthesia: General     Estimated Blood Loss (mL): Minimal    Complications: None    Specimens: * No specimens in log *     Implants:   Implant Name Type Inv.  Item Serial No.  Lot No. LRB No. Used Action   STENT URET 6FR L24CM PERCFLX HYDR+ SFT PGTL TAPR TIP W/O - SN/A  STENT URET 6FR L24CM PERCFLX HYDR+ SFT PGTL TAPR TIP W/O N/A [employer] [facility] [id number] Right 1 Implanted       Drains: * No LDAs found *    Findings:  Right stent    Electronically Signed by Phu Peter MD on 5/27/2021 at 2:56 PM

## 2021-05-27 NOTE — PROGRESS NOTES
5/27/2021  9:59 AM  RUR:  NA - OBS  Risk Level: [x]Low []Moderate []High  Value-based purchasing: [] Yes [x] No  Bundle patient: [] Yes [x] No   Specify:     Transition of care plan:  1. Awaiting medical clearance and DC order. Cysto today. 2. Home with family assistance as needed. 3. Outpatient follow-up. 4. Pt's family to transport.

## 2021-05-27 NOTE — ANESTHESIA POSTPROCEDURE EVALUATION
Procedure(s):  CYSTOSCOPY URETERAL STENT INSERTION RETROGRADE RIGHT STENT PLACEMENT. general    Anesthesia Post Evaluation      Multimodal analgesia: multimodal analgesia used between 6 hours prior to anesthesia start to PACU discharge  Patient location during evaluation: PACU  Patient participation: complete - patient participated  Level of consciousness: awake and alert  Pain management: adequate  Airway patency: patent  Anesthetic complications: no  Cardiovascular status: acceptable  Respiratory status: acceptable  Hydration status: acceptable  Post anesthesia nausea and vomiting:  none  Final Post Anesthesia Temperature Assessment:  Normothermia (36.0-37.5 degrees C)      INITIAL Post-op Vital signs:   Vitals Value Taken Time   BP 96/58 05/27/21 1507   Temp 36.6 °C (97.9 °F) 05/27/21 1503   Pulse 90 05/27/21 1529   Resp 17 05/27/21 1529   SpO2 93 % 05/27/21 1529   Vitals shown include unvalidated device data.

## 2021-05-28 VITALS
HEART RATE: 79 BPM | DIASTOLIC BLOOD PRESSURE: 77 MMHG | RESPIRATION RATE: 16 BRPM | SYSTOLIC BLOOD PRESSURE: 121 MMHG | TEMPERATURE: 98.5 F | OXYGEN SATURATION: 95 % | HEIGHT: 61 IN | WEIGHT: 128 LBS | BODY MASS INDEX: 24.17 KG/M2

## 2021-05-28 PROCEDURE — 74011250637 HC RX REV CODE- 250/637: Performed by: UROLOGY

## 2021-05-28 PROCEDURE — 74011250637 HC RX REV CODE- 250/637: Performed by: INTERNAL MEDICINE

## 2021-05-28 PROCEDURE — 74011250636 HC RX REV CODE- 250/636: Performed by: UROLOGY

## 2021-05-28 PROCEDURE — 77030027138 HC INCENT SPIROMETER -A

## 2021-05-28 PROCEDURE — 99218 HC RM OBSERVATION: CPT

## 2021-05-28 RX ORDER — OXYCODONE AND ACETAMINOPHEN 5; 325 MG/1; MG/1
1 TABLET ORAL
Status: DISCONTINUED | OUTPATIENT
Start: 2021-05-28 | End: 2021-05-28 | Stop reason: HOSPADM

## 2021-05-28 RX ORDER — OXYCODONE AND ACETAMINOPHEN 5; 325 MG/1; MG/1
1 TABLET ORAL
Qty: 20 TABLET | Refills: 0 | Status: SHIPPED | OUTPATIENT
Start: 2021-05-28 | End: 2021-06-02

## 2021-05-28 RX ORDER — ONDANSETRON 4 MG/1
4 TABLET, ORALLY DISINTEGRATING ORAL
Qty: 10 TABLET | Refills: 0 | Status: SHIPPED | OUTPATIENT
Start: 2021-05-28 | End: 2021-06-07

## 2021-05-28 RX ADMIN — ONDANSETRON 4 MG: 2 INJECTION INTRAMUSCULAR; INTRAVENOUS at 01:25

## 2021-05-28 RX ADMIN — MORPHINE SULFATE 2 MG: 2 INJECTION, SOLUTION INTRAMUSCULAR; INTRAVENOUS at 00:28

## 2021-05-28 RX ADMIN — MORPHINE SULFATE 2 MG: 2 INJECTION, SOLUTION INTRAMUSCULAR; INTRAVENOUS at 08:37

## 2021-05-28 RX ADMIN — DEXTROSE MONOHYDRATE, SODIUM CHLORIDE, AND POTASSIUM CHLORIDE 75 ML/HR: 50; 4.5; 1.49 INJECTION, SOLUTION INTRAVENOUS at 04:36

## 2021-05-28 RX ADMIN — ONDANSETRON 4 MG: 4 TABLET, ORALLY DISINTEGRATING ORAL at 08:37

## 2021-05-28 RX ADMIN — MORPHINE SULFATE 2 MG: 2 INJECTION, SOLUTION INTRAMUSCULAR; INTRAVENOUS at 04:36

## 2021-05-28 RX ADMIN — FAMOTIDINE 20 MG: 20 TABLET ORAL at 08:37

## 2021-05-28 RX ADMIN — OXYCODONE HYDROCHLORIDE AND ACETAMINOPHEN 1 TABLET: 5; 325 TABLET ORAL at 13:38

## 2021-05-28 NOTE — PROGRESS NOTES
[facility]    Medical Progress Note      NAME: Nicola Wilkerson   :  1955  MRM:  [id number]    Date/Time of service 2021  8:02 AM          Assessment and Plan:     Nephrolithiasis / Hydronephrosis / Flank pain / Hematuria - POA, 1.5 cm calculous right UPJ with hydro. UA unremarkable. Urology did cystoscopy and stent placement . She is cleared to DC home.     BRBPR (bright red blood per rectum) - POA: likely hemorrhoidal. Need a Gi review and possible repeat scope once she has recovered from current illness. Advised her to arrange follow up       Subjective:     Chief Complaint:  Tolerated cystoscopy    ROS:  (bold if positive, if negative)    Tolerating PT  Tolerating Diet        Objective:     Last 24hrs VS reviewed since prior progress note.  Most recent are:    Visit Vitals  BP (!) 93/54 (BP 1 Location: Right upper arm, BP Patient Position: At rest)   Pulse 84   Temp 97.5 °F (36.4 °C)   Resp 16   Ht 5' 1\" (1.549 m)   Wt 58.1 kg (128 lb)   SpO2 93%   BMI 24.19 kg/m²     SpO2 Readings from Last 6 Encounters:   21 93%   10/18/17 100%            Intake/Output Summary (Last 24 hours) at 2021 0802  Last data filed at 2021 0437  Gross per 24 hour   Intake 420 ml   Output 1850 ml   Net -1430 ml        Physical Exam:    Gen:  Well-developed, well-nourished, in no acute distress  HEENT:  Pink conjunctivae, PERRL, hearing intact to voice, moist mucous membranes  Neck:  Supple, without masses, thyroid non-tender  Resp:  No accessory muscle use, clear breath sounds without wheezes rales or rhonchi  Card:  No murmurs, normal S1, S2 without thrills, bruits or peripheral edema  Abd:  Soft, non-tender, non-distended, normoactive bowel sounds are present, no mass  Lymph:  No cervical or inguinal adenopathy  Musc:  No cyanosis or clubbing  Skin:  No rashes or ulcers, skin turgor is good  Neuro:  Cranial nerves are grossly intact, no focal motor weakness, follows commands appropriately  Psych:  Good insight, oriented to person, place and time, alert    Telemetry reviewed:   normal sinus rhythm  __________________________________________________________________  Medications Reviewed: (see below)  Medications:     Current Facility-Administered Medications   Medication Dose Route Frequency    lactated Ringers infusion  50 mL/hr IntraVENous CONTINUOUS    acetaminophen (TYLENOL) tablet 650 mg  650 mg Oral Q6H PRN    Or    acetaminophen (TYLENOL) suppository 650 mg  650 mg Rectal Q6H PRN    polyethylene glycol (MIRALAX) packet 17 g  17 g Oral DAILY PRN    ondansetron (ZOFRAN ODT) tablet 4 mg  4 mg Oral Q8H PRN    Or    ondansetron (ZOFRAN) injection 4 mg  4 mg IntraVENous Q6H PRN    famotidine (PEPCID) tablet 20 mg  20 mg Oral BID    dextrose 5% - 0.45% NaCl with KCl 20 mEq/L infusion  75 mL/hr IntraVENous CONTINUOUS    morphine injection 2 mg  2 mg IntraVENous Q4H PRN        Lab Data Reviewed: (see below)  Lab Review:     Recent Labs     05/27/21  0246 05/26/21  1113   WBC 6.1 6.1   HGB 13.8 15.6   HCT 42.6 45.4    241     Recent Labs     05/27/21  0246 05/26/21  1147 05/26/21  1113     --  140   K 4.2  --  4.0   *  --  109*   CO2 28  --  23   GLU 85  --  89   BUN 28*  --  22*   CREA 1.18*  --  1.10*   CA 8.2*  --  8.9   MG 2.3  --   --    ALB 3.1*  --  3.7   TBILI 0.4  --  0.4   ALT 33  --  43   INR  --  1.0  --      Lab Results   Component Value Date/Time    Glucose (POC) 93 10/16/2017 09:19 AM    Glucose (POC) 116 (H) 10/15/2017 02:12 PM    Glucose (POC) 141 (H) 10/14/2017 01:14 PM     No results for input(s): PH, PCO2, PO2, HCO3, FIO2 in the last 72 hours.   Recent Labs     05/26/21  1147   INR 1.0     All Micro Results     Procedure Component Value Units Date/Time    CULTURE, URINE [[id number]] Collected: 05/26/21 1238    Order Status: Completed Specimen: Urine from Clean catch Updated: 05/27/21 1412     Special Requests: NO SPECIAL REQUESTS        Culture result: No growth (<1,000 CFU/ML)       COVID-19 RAPID TEST [[id number]] Collected: 05/27/21 1239    Order Status: Completed Specimen: Nasopharyngeal Updated: 05/27/21 1310     Specimen source Nasopharyngeal        COVID-19 rapid test Not detected        Comment: Rapid Abbott ID Now       Rapid NAAT:  The specimen is NEGATIVE for SARS-CoV-2, the novel coronavirus associated with COVID-19. Negative results should be treated as presumptive and, if inconsistent with clinical signs and symptoms or necessary for patient management, should be tested with an alternative molecular assay. Negative results do not preclude SARS-CoV-2 infection and should not be used as the sole basis for patient management decisions. This test has been authorized by the FDA under an Emergency Use Authorization (EUA) for use by authorized laboratories. Fact sheet for Healthcare Providers: ConventionUpdate.co.nz  Fact sheet for Patients: ConventionUpdate.co.nz       Methodology: Isothermal Nucleic Acid Amplification         URINE CULTURE HOLD SAMPLE [[id number]] Collected: 05/26/21 1238    Order Status: Completed Specimen: Urine from Serum Updated: 05/26/21 1241     Urine culture hold       Urine on hold in Microbiology dept for 2 days. If unpreserved urine is submitted, it cannot be used for addtional testing after 24 hours, recollection will be required. Other pertinent lab: none    Total time spent with patient: 30 Minutes I personally reviewed chart, notes, data and current medications in the medical record. I have personally examined and treated the patient at bedside during this period.                  Care Plan discussed with: Patient, Nursing Staff, Consultant/Specialist and >50% of time spent in counseling and coordination of care    Discussed:  Care Plan and D/C Planning    Prophylaxis:  H2B/PPI    Disposition:  Home w/Family           ___________________________________________________    Attending Physician: Luzmaria Simpson MD

## 2021-05-28 NOTE — PROGRESS NOTES
CARE MANAGEMENT   DAILY PROGRESS NOTE        NAME:   Manjeet Fontenot   :     1955   MRN:     [id number]     RUR:  N/A OBS  Risk Level:  N/A  Value-based purchasing:  No.   Bundle patient:  No.    Transition of care plan:  1. Pt has discharged orders on chart. 2. Discharge plan remains for Pt to return home. CM asked to see Pt re: care card. CM provided Pt with care card application and explained how to complete and where to submit. Pt voiced understanding. No further CM needs identified. 3. Outpatient follow up. 4. Transportation: Family       _____________________________________  Nejoaquin Curry, [address] - Care Management  2021   12:11 PM    Care Management Interventions  PCP Verified by CM: Yes ([address] [place] [address] Internal Medicine)  Last Visit to PCP: 21  Mode of Transport at Discharge:  Other (see comment) (friend)  Transition of Care Consult (CM Consult): Discharge Planning  MyChart Signup: No  Discharge Durable Medical Equipment: No  Physical Therapy Consult: No  Occupational Therapy Consult: No  Speech Therapy Consult: No  Current Support Network: Lives Alone, Own Home  Confirm Follow Up Transport: Family  Discharge Location  Discharge Placement: Home with outpatient services

## 2021-05-28 NOTE — PROGRESS NOTES
Urology Progress Note    Patient: Helena Stubbs MRN: [id number]  SSN: [id number]    YOB: 1955  Age: 78 y.o. Sex: female        Assessment:     Helena Stubbs is a 78 y.o. female admitted to the hospital on 5/26/2021 for 16mm right UPJ stone with hydronephrosis. She is s/p right stent placement by Dr. Violet Bernstein on 5/27/2021    Plan:     1. Pyuria on UA, urine culture negative. 2. 16mm RIGHT UPJ stone. Stent placed 5/27  3. Right hydronephrosis. Stent placed 5/27  4. Will need outpatient follow up with [facility] to discuss definitive stone management. Patient aware and agreeable to plan. Scheduled for 6/4    Reason For Visit:     Follow up for 16mm right UPJ stone. Interval History:     No acute events overnight. Having some stent discomfort but tolerable.      ROS:     Denies fevers  Reports abdominal pain  Reports frequency    Objective:     Visit Vitals  /70 (BP 1 Location: Right upper arm, BP Patient Position: At rest)   Pulse 77   Temp 98.6 °F (37 °C)   Resp 16   Ht 5' 1\" (1.549 m)   Wt 58.1 kg (128 lb)   SpO2 96%   BMI 24.19 kg/m²         Intake/Output Summary (Last 24 hours) at 5/28/2021 1217  Last data filed at 5/28/2021 0700  Gross per 24 hour   Intake 1320 ml   Output 1850 ml   Net -530 ml       Physical Exam  General: NAD  Respiratory: no distress, room air  Neuro: Appropriate, no focal neurological deficits    Labs reviewed, May 28, 2021  Recent Results (from the past 24 hour(s))   COVID-19 RAPID TEST    Collection Time: 05/27/21 12:39 PM   Result Value Ref Range    Specimen source Nasopharyngeal      COVID-19 rapid test Not detected NOTD         Imaging:  CT Results  (Last 48 hours)    None          Signed By: Nikko Marc NP - May 28, 2021

## 2021-05-28 NOTE — DISCHARGE INSTRUCTIONS
Patient Discharge Instructions    Rafi Zazueta / [id number] : 1955    Admitted 2021 Discharged: 2021     Primary Diagnoses  Problem List as of 2021 Date Reviewed: 2021         * (Principal) Nephrolithiasis   Hydronephrosis   Flank pain   Hematuria   Renal insufficiency   BRBPR (bright red blood per rectum)          Take Home Medications     · It is important that you take the medication exactly as they are prescribed. · Keep your medication in the bottles provided by the pharmacist and keep a list of the medication names, dosages, and times to be taken in your wallet. · Do not take other medications without consulting your doctor. What to do at Home    Recommended diet: Regular Diet, Increase noncaffeinated fluids and Encourage fluids    Recommended activity: Activity as tolerated    If you experience worse symptoms, please follow up with urology. Follow-up with your PCP in a few weeks    Patient Education        Kidney Stone: Care Instructions  Your Care Instructions     Kidney stones are formed when salts, minerals, and other substances normally found in the urine clump together. They can be as small as grains of sand or, rarely, as large as golf balls. While the stone is traveling through the ureter, which is the tube that carries urine from the kidney to the bladder, you will probably feel pain. The pain may be mild or very severe. You may also have some blood in your urine. As soon as the stone reaches the bladder, any intense pain should go away. If a stone is too large to pass on its own, you may need a medical procedure to help you pass the stone. The doctor has checked you carefully, but problems can develop later. If you notice any problems or new symptoms, get medical treatment right away. Follow-up care is a key part of your treatment and safety. Be sure to make and go to all appointments, and call your doctor if you are having problems.  It's also a good idea to know your test results and keep a list of the medicines you take. How can you care for yourself at home? · Drink plenty of fluids. If you have kidney, heart, or liver disease and have to limit fluids, talk with your doctor before you increase the amount of fluids you drink. · Take pain medicines exactly as directed. Call your doctor if you think you are having a problem with your medicine. ? If the doctor gave you a prescription medicine for pain, take it as prescribed. ? If you are not taking a prescription pain medicine, ask your doctor if you can take an over-the-counter medicine. Read and follow all instructions on the label. · Your doctor may ask you to strain your urine so that you can collect your kidney stone when it passes. You can use a kitchen strainer or a tea strainer to catch the stone. Store it in a plastic bag until you see your doctor again. Preventing future kidney stones  Some changes in your diet may help prevent kidney stones. Depending on the cause of your stones, your doctor may recommend that you:  · Drink plenty of fluids, enough so that your urine is light yellow or clear like water. If you have kidney, heart, or liver disease and have to limit fluids, talk with your doctor before you increase the amount of fluids you drink. · Limit coffee, tea, and alcohol. Also avoid grapefruit juice. · Do not take more than the recommended daily dose of vitamins C and D.  · Avoid antacids such as Gaviscon, Maalox, Mylanta, or Tums. · Limit the amount of salt (sodium) in your diet. · Eat a balanced diet that is not too high in protein. · Limit foods that are high in a substance called oxalate, which can cause kidney stones. These foods include dark green vegetables, rhubarb, chocolate, wheat bran, nuts, cranberries, and beans. When should you call for help?    Call your doctor now or seek immediate medical care if:    · You cannot keep down fluids.     · Your pain gets worse.     · You have a fever or chills.     · You have new or worse pain in your back just below your rib cage (the flank area).     · You have new or more blood in your urine. Watch closely for changes in your health, and be sure to contact your doctor if:    · You do not get better as expected. Where can you learn more? Go to http://kingston-maggie.info/  Enter M019 in the search box to learn more about \"Kidney Stone: Care Instructions. \"  Current as of: December 17, 2020               Content Version: 12.8  © 5764-6002 [employer]. Care instructions adapted under license by [employer] (which disclaims liability or warranty for this information). If you have questions about a medical condition or this instruction, always ask your healthcare professional. [place] [address] any warranty or liability for your use of this information. Information obtained by :  I understand that if any problems occur once I am at home I am to contact my physician. I understand and acknowledge receipt of the instructions indicated above.                                                                                                                                            Physician's or R.N.'s Signature                                                                  Date/Time                                                                                                                                              Patient or Representative Signature                                                          Date/Time

## 2021-05-28 NOTE — DISCHARGE SUMMARY
Physician Discharge Summary     Patient ID:  Katie Abrams  [id number]  3 y.o.  1955    Admit date: 5/26/2021    Discharge date of service and time: 5/28/2021    Admission Diagnoses: Nephrolithiasis [N20.0]    Discharge Diagnoses:    Principal Diagnosis   Nephrolithiasis                                             Hospital Course and other diagnoses  Nephrolithiasis / Hydronephrosis / Flank pain / Hematuria - POA, 1.5 cm calculous right UPJ with hydro. UA unremarkable. Urology did cystoscopy and stent placement 5/27. She is cleared to DC home.     BRBPR (bright red blood per rectum) - POA: likely hemorrhoidal. Need a Gi review and possible repeat scope once she has recovered from current illness. Advised her to arrange follow up    PCP: Abbie Horner MD    Consults: Urology    Significant Diagnostic Studies: See Hospital Course    Discharged home in improved condition.     Discharge Exam:  BP (!) 93/54 (BP 1 Location: Right upper arm, BP Patient Position: At rest)   Pulse 84   Temp 97.5 °F (36.4 °C)   Resp 16   Ht 5' 1\" (1.549 m)   Wt 58.1 kg (128 lb)   SpO2 93%   BMI 24.19 kg/m²      Gen:  Well-developed, well-nourished, in no acute distress  HEENT:  Pink conjunctivae, PERRL, hearing intact to voice, moist mucous membranes  Neck:  Supple, without masses, thyroid non-tender  Resp:  No accessory muscle use, clear breath sounds without wheezes rales or rhonchi  Card:  No murmurs, normal S1, S2 without thrills, bruits or peripheral edema  Abd:  Soft, non-tender, non-distended, normoactive bowel sounds are present, no mass  Lymph:  No cervical or inguinal adenopathy  Musc:  No cyanosis or clubbing  Skin:  No rashes or ulcers, skin turgor is good  Neuro:  Cranial nerves are grossly intact, no focal motor weakness, follows commands appropriately  Psych:  Good insight, oriented to person, place and time, alert    Patient Instructions:   Current Discharge Medication List      START taking these medications Details   oxyCODONE-acetaminophen (Percocet) 5-325 mg per tablet Take 1 Tablet by mouth every six (6) hours as needed for Pain for up to 5 days. Max Daily Amount: 4 Tablets. Qty: 20 Tablet, Refills: 0    Associated Diagnoses: Kidney stone      ondansetron (ZOFRAN ODT) 4 mg disintegrating tablet Take 1 Tablet by mouth every eight (8) hours as needed for Nausea or Vomiting for up to 10 days. Qty: 10 Tablet, Refills: 0         CONTINUE these medications which have NOT CHANGED    Details   cholecalciferol (Vitamin D3) (1000 Units /25 mcg) tablet Take 1,000 Units by mouth daily. aspirin 81 mg chewable tablet Take 1 Tab by mouth daily. Qty: 30 Tab, Refills: 0           Activity: Activity as tolerated  Diet: Regular Diet, Increase noncaffeinated fluids and Encourage fluids  Wound Care: None needed    Follow-up with your PCP and Urology in 1 week.   Follow-up tests/labs - none    Signed:  Elyse Cheng MD  5/28/2021  11:20 AM

## 2021-05-28 NOTE — PROGRESS NOTES
Discussed discharge information with patient. The patient verbalized understanding. Patient provided with written prescriptions. Opportunity for questions and clarification offered. Removed patient's IV access with no complications. Vital signs stable. Patient sent with all belongings.

## 2022-03-18 PROBLEM — N28.9 RENAL INSUFFICIENCY: Status: ACTIVE | Noted: 2021-05-26

## 2022-03-19 PROBLEM — N20.0 NEPHROLITHIASIS: Status: ACTIVE | Noted: 2021-05-26

## 2022-03-19 PROBLEM — R31.9 HEMATURIA: Status: ACTIVE | Noted: 2021-05-26

## 2022-03-19 PROBLEM — K62.5 BRBPR (BRIGHT RED BLOOD PER RECTUM): Status: ACTIVE | Noted: 2021-05-26

## 2022-03-20 PROBLEM — N13.30 HYDRONEPHROSIS: Status: ACTIVE | Noted: 2021-05-26

## 2022-03-20 PROBLEM — R10.9 FLANK PAIN: Status: ACTIVE | Noted: 2021-05-26

## 2022-04-24 ENCOUNTER — HOSPITAL ENCOUNTER (EMERGENCY)
Age: 67
Discharge: HOME | End: 2022-04-24
Attending: EMERGENCY MEDICINE
Payer: MEDICARE

## 2022-04-24 ENCOUNTER — APPOINTMENT (OUTPATIENT)
Dept: CT IMAGING | Age: 67
End: 2022-04-24
Attending: EMERGENCY MEDICINE
Payer: MEDICARE

## 2022-04-24 VITALS
HEART RATE: 107 BPM | DIASTOLIC BLOOD PRESSURE: 92 MMHG | BODY MASS INDEX: 22.66 KG/M2 | TEMPERATURE: 98.5 F | WEIGHT: 120 LBS | SYSTOLIC BLOOD PRESSURE: 121 MMHG | OXYGEN SATURATION: 95 % | RESPIRATION RATE: 16 BRPM | HEIGHT: 61 IN

## 2022-04-24 DIAGNOSIS — K52.9 COLITIS: Primary | ICD-10-CM

## 2022-04-24 DIAGNOSIS — R19.7 DIARRHEA, UNSPECIFIED TYPE: ICD-10-CM

## 2022-04-24 DIAGNOSIS — K62.89 PROCTITIS: ICD-10-CM

## 2022-04-24 LAB
ALBUMIN SERPL-MCNC: 3.4 G/DL (ref 3.5–5)
ALBUMIN/GLOB SERPL: 0.7 {RATIO} (ref 1.1–2.2)
ALP SERPL-CCNC: 103 U/L (ref 45–117)
ALT SERPL-CCNC: 19 U/L (ref 12–78)
ANION GAP SERPL CALC-SCNC: 11 MMOL/L (ref 5–15)
APPEARANCE UR: ABNORMAL
AST SERPL-CCNC: 17 U/L (ref 15–37)
BACTERIA URNS QL MICRO: ABNORMAL /HPF
BASOPHILS # BLD: 0 K/UL (ref 0–0.1)
BASOPHILS NFR BLD: 0 % (ref 0–1)
BILIRUB SERPL-MCNC: 0.6 MG/DL (ref 0.2–1)
BILIRUB UR QL: NEGATIVE
BUN SERPL-MCNC: 32 MG/DL (ref 6–20)
BUN/CREAT SERPL: 25 (ref 12–20)
CALCIUM SERPL-MCNC: 9.5 MG/DL (ref 8.5–10.1)
CHLORIDE SERPL-SCNC: 106 MMOL/L (ref 97–108)
CO2 SERPL-SCNC: 20 MMOL/L (ref 21–32)
COLOR UR: ABNORMAL
CREAT SERPL-MCNC: 1.27 MG/DL (ref 0.55–1.02)
DIFFERENTIAL METHOD BLD: ABNORMAL
EOSINOPHIL # BLD: 0.7 K/UL (ref 0–0.4)
EOSINOPHIL NFR BLD: 11 % (ref 0–7)
EPITH CASTS URNS QL MICRO: ABNORMAL /LPF
ERYTHROCYTE [DISTWIDTH] IN BLOOD BY AUTOMATED COUNT: 13.4 % (ref 11.5–14.5)
GLOBULIN SER CALC-MCNC: 4.7 G/DL (ref 2–4)
GLUCOSE SERPL-MCNC: 86 MG/DL (ref 65–100)
GLUCOSE UR STRIP.AUTO-MCNC: NEGATIVE MG/DL
GRAN CASTS URNS QL MICRO: ABNORMAL /LPF
HCT VFR BLD AUTO: 49.8 % (ref 35–47)
HGB BLD-MCNC: 16.6 G/DL (ref 11.5–16)
HGB UR QL STRIP: ABNORMAL
HYALINE CASTS URNS QL MICRO: >20 /LPF (ref 0–5)
IMM GRANULOCYTES # BLD AUTO: 0 K/UL
IMM GRANULOCYTES NFR BLD AUTO: 0 %
KETONES UR QL STRIP.AUTO: 80 MG/DL
LACTATE SERPL-SCNC: 1.8 MMOL/L (ref 0.4–2)
LEUKOCYTE ESTERASE UR QL STRIP.AUTO: ABNORMAL
LYMPHOCYTES # BLD: 1.2 K/UL (ref 0.8–3.5)
LYMPHOCYTES NFR BLD: 18 % (ref 12–49)
MAGNESIUM SERPL-MCNC: 2.1 MG/DL (ref 1.6–2.4)
MCH RBC QN AUTO: 30.9 PG (ref 26–34)
MCHC RBC AUTO-ENTMCNC: 33.3 G/DL (ref 30–36.5)
MCV RBC AUTO: 92.7 FL (ref 80–99)
METAMYELOCYTES NFR BLD MANUAL: 2 %
MONOCYTES # BLD: 1.2 K/UL (ref 0–1)
MONOCYTES NFR BLD: 19 % (ref 5–13)
MUCOUS THREADS URNS QL MICRO: ABNORMAL /LPF
NEUTS BAND NFR BLD MANUAL: 29 % (ref 0–6)
NEUTS SEG # BLD: 3.2 K/UL (ref 1.8–8)
NEUTS SEG NFR BLD: 21 % (ref 32–75)
NITRITE UR QL STRIP.AUTO: NEGATIVE
NRBC # BLD: 0 K/UL (ref 0–0.01)
NRBC BLD-RTO: 0 PER 100 WBC
PH UR STRIP: 5.5 [PH] (ref 5–8)
PLATELET # BLD AUTO: 229 K/UL (ref 150–400)
PMV BLD AUTO: 10.9 FL (ref 8.9–12.9)
POTASSIUM SERPL-SCNC: 3.6 MMOL/L (ref 3.5–5.1)
PROT SERPL-MCNC: 8.1 G/DL (ref 6.4–8.2)
PROT UR STRIP-MCNC: 300 MG/DL
RBC # BLD AUTO: 5.37 M/UL (ref 3.8–5.2)
RBC #/AREA URNS HPF: >100 /HPF (ref 0–5)
RBC MORPH BLD: ABNORMAL
SODIUM SERPL-SCNC: 137 MMOL/L (ref 136–145)
SP GR UR REFRACTOMETRY: 1.02 (ref 1–1.03)
UR CULT HOLD, URHOLD: NORMAL
UROBILINOGEN UR QL STRIP.AUTO: 1 EU/DL (ref 0.2–1)
WBC # BLD AUTO: 6.4 K/UL (ref 3.6–11)
WBC MORPH BLD: ABNORMAL
WBC URNS QL MICRO: ABNORMAL /HPF (ref 0–4)

## 2022-04-24 PROCEDURE — 83605 ASSAY OF LACTIC ACID: CPT

## 2022-04-24 PROCEDURE — 85025 COMPLETE CBC W/AUTO DIFF WBC: CPT

## 2022-04-24 PROCEDURE — 74176 CT ABD & PELVIS W/O CONTRAST: CPT

## 2022-04-24 PROCEDURE — 99284 EMERGENCY DEPT VISIT MOD MDM: CPT

## 2022-04-24 PROCEDURE — 81001 URINALYSIS AUTO W/SCOPE: CPT

## 2022-04-24 PROCEDURE — 83735 ASSAY OF MAGNESIUM: CPT

## 2022-04-24 PROCEDURE — 74011250636 HC RX REV CODE- 250/636: Performed by: EMERGENCY MEDICINE

## 2022-04-24 PROCEDURE — 87086 URINE CULTURE/COLONY COUNT: CPT

## 2022-04-24 PROCEDURE — 80053 COMPREHEN METABOLIC PANEL: CPT

## 2022-04-24 PROCEDURE — 96360 HYDRATION IV INFUSION INIT: CPT

## 2022-04-24 PROCEDURE — 74011250637 HC RX REV CODE- 250/637: Performed by: EMERGENCY MEDICINE

## 2022-04-24 PROCEDURE — 36415 COLL VENOUS BLD VENIPUNCTURE: CPT

## 2022-04-24 RX ORDER — METRONIDAZOLE 250 MG/1
500 TABLET ORAL
Status: COMPLETED | OUTPATIENT
Start: 2022-04-24 | End: 2022-04-24

## 2022-04-24 RX ORDER — METRONIDAZOLE 500 MG/1
500 TABLET ORAL 3 TIMES DAILY
Qty: 30 TABLET | Refills: 0 | Status: SHIPPED | OUTPATIENT
Start: 2022-04-24 | End: 2022-05-04

## 2022-04-24 RX ORDER — CIPROFLOXACIN 500 MG/1
500 TABLET ORAL 2 TIMES DAILY
Qty: 20 TABLET | Refills: 0 | Status: SHIPPED | OUTPATIENT
Start: 2022-04-24 | End: 2022-05-04

## 2022-04-24 RX ORDER — CIPROFLOXACIN 500 MG/1
500 TABLET ORAL
Status: COMPLETED | OUTPATIENT
Start: 2022-04-24 | End: 2022-04-24

## 2022-04-24 RX ADMIN — SODIUM CHLORIDE 1000 ML: 9 INJECTION, SOLUTION INTRAVENOUS at 12:23

## 2022-04-24 RX ADMIN — CIPROFLOXACIN 500 MG: 500 TABLET, FILM COATED ORAL at 15:16

## 2022-04-24 RX ADMIN — METRONIDAZOLE 500 MG: 250 TABLET ORAL at 15:16

## 2022-04-24 NOTE — ED TRIAGE NOTES
Pt arrives to ED with c/o diarrhea, bloody stool and abdominal pain. Pt states that the diarrhea started on Monday after eating dinner. Pt says \"it is runny and has a yellowish color\". Pt recently had a colonoscopy about two weeks ago and states that they found something but is unable to remember what they found.

## 2022-04-24 NOTE — ED NOTES
Pt placed on monitor x2. Bed locked and lowered. Call bell within reach. Pt remains in no acute distress.

## 2022-04-24 NOTE — ED PROVIDER NOTES
Please note that this dictation was completed with [employer], the computer voice recognition software.  Quite often unanticipated grammatical, syntax, homophones, and other interpretive errors are inadvertently transcribed by the computer software.  Please disregard these errors.  Please excuse any errors that have escaped final proofreading. Patient is a 60-year-old female history of cervical cancer treated with surgery, Lyme's disease, presenting to ED for evaluation of diarrhea. States that she has been having diarrhea and bloody stools for the past 1 year, followed by Dr. Sherice Rangel with GI, who did a colonoscopy last week. States that she was told she had proctitis and started on steroids and suppositories. States that for the past 6 days she has been having more persistent diarrhea, unsure how many times per day, that appears yellow and malodorous. Notes she feels similar to a previous \"infection\" many years ago required hospitalization and IV antibiotics, she has not sure if she has ever been diagnosed with C. difficile. She also notes has been feeling muscle cramping in her legs of the past few days and feels like she has been urinating less than usual.            Past Medical History:   Diagnosis Date    Cancer ([facility] [address]     cervical cancer    Cancer ([facility] [address]     skin cancer    Ill-defined condition     lyme disease       Past Surgical History:   Procedure Laterality Date    HX GI      isaias colectomy    HX GYN      partial hysterectomy    HX GYN      c section    HX ORTHOPAEDIC      RIGHT shoulder surgery    HX ORTHOPAEDIC      RIGHT foot surgery    NEUROLOGICAL PROCEDURE UNLISTED      lumber surgery         No family history on file.     Social History     Socioeconomic History    Marital status: SINGLE     Spouse name: Not on file    Number of children: Not on file    Years of education: Not on file    Highest education level: Not on file   Occupational History    Not on file   Tobacco Use    Smoking status: Current Every Day Smoker     Packs/day: 1.00    Smokeless tobacco: Never Used   Substance and Sexual Activity    Alcohol use: Yes     Alcohol/week: 10.0 standard drinks     Types: 10 Shots of liquor per week    Drug use: Not on file    Sexual activity: Not on file   Other Topics Concern    Not on file   Social History Narrative    Not on file     Social Determinants of Health     Financial Resource Strain:     Difficulty of Paying Living Expenses: Not on file   Food Insecurity:     Worried About Running Out of Food in the Last Year: Not on file    Paddy of Food in the Last Year: Not on file   Transportation Needs:     Lack of Transportation (Medical): Not on file    Lack of Transportation (Non-Medical): Not on file   Physical Activity:     Days of Exercise per Week: Not on file    Minutes of Exercise per Session: Not on file   Stress:     Feeling of Stress : Not on file   Social Connections:     Frequency of Communication with Friends and Family: Not on file    Frequency of Social Gatherings with Friends and Family: Not on file    Attends Restorationist Services: Not on file    Active Member of [employer] or Organizations: Not on file    Attends Club or Organization Meetings: Not on file    Marital Status: Not on file   Intimate Partner Violence:     Fear of Current or Ex-Partner: Not on file    Emotionally Abused: Not on file    Physically Abused: Not on file    Sexually Abused: Not on file   Housing Stability:     Unable to Pay for Housing in the Last Year: Not on file    Number of [place] in the Last Year: Not on file    Unstable Housing in the Last Year: Not on file         ALLERGIES: Darvocet a500 [propoxyphene n-acetaminophen], Iodine, Prednisone, Seafood, and Sulfa (sulfonamide antibiotics)    Review of Systems   Constitutional: Negative for chills and fever. HENT: Negative for congestion, ear pain and sore throat. Eyes: Negative for visual disturbance. Respiratory: Negative for cough and shortness of breath. Cardiovascular: Negative for chest pain. Gastrointestinal: Positive for abdominal pain, diarrhea and nausea. Negative for vomiting. Genitourinary: Negative for dysuria and flank pain. Musculoskeletal: Positive for myalgias (leg cramping). Negative for back pain. Skin: Negative for color change. Neurological: Negative for dizziness and headaches. Psychiatric/Behavioral: Negative for confusion. Vitals:    04/24/22 1116   BP: 114/70   Pulse: (!) 107   Resp: 16   Temp: 98.5 °F (36.9 °C)   SpO2: 95%   Weight: 54.4 kg (120 lb)   Height: 5' 1\" (1.549 m)            Physical Exam  Vitals and nursing note reviewed. Constitutional:       General: She is not in acute distress. Appearance: Normal appearance. She is not ill-appearing. HENT:      Head: Normocephalic and atraumatic. Eyes:      General: Vision grossly intact. Extraocular Movements: Extraocular movements intact. Conjunctiva/sclera: Conjunctivae normal.   Neck:      Trachea: Phonation normal.   Cardiovascular:      Rate and Rhythm: Normal rate and regular rhythm. Heart sounds: Normal heart sounds. Pulmonary:      Effort: Pulmonary effort is normal.      Breath sounds: Normal breath sounds and air entry. Abdominal:      Palpations: Abdomen is soft. Tenderness: There is generalized abdominal tenderness. Musculoskeletal:         General: Normal range of motion. Skin:     General: Skin is warm and dry. Neurological:      General: No focal deficit present. Mental Status: She is alert and oriented to person, place, and time. Psychiatric:         Behavior: Behavior normal.          MDM  Number of Diagnoses or Management Options  Colitis  Diarrhea, unspecified type  Proctitis  Diagnosis management comments: Patient is alert, afebrile, vitals stable. Slightly tachycardic remainder of vitals are stable.   Presents with chronic diarrhea and bloody stool but has noticed significant increase in the amount of her diarrhea and abdominal pain. Has spoken to her GI [facility]) who recommended Immodium and ordered outpatient stool cultures which she has not done yet. Lab work reassuring, normal white count, electrolytes and hemoglobin. CT scan showing colitis and inflammation surrounding her rectum suspicious for possible mass lesion, but she had a colonoscopy last week consistent with proctitis. Patient reevaluated, feeling better after fluids and Zofran, tolerating p.o. Tachycardia resolved. Patient was unable to provide stool sample during her entire 5-hour stay in ED. We will discharge her home on oral antibiotics and close follow-up with her gastroenterologist.  Strict return precautions outlined. All questions answered at this time. Amount and/or Complexity of Data Reviewed  Decide to obtain previous medical records or to obtain history from someone other than the patient: yes  Discuss the patient with other providers: yes (Discussed patient with ED attending Dr. Aravind Cummings who agrees with current management plan. )      ED Course as of 04/24/22 1235   Sun Apr 24, 2022   1234 CT ABD PELV WO CONT  IMPRESSION  1. Transverse colonic wall thickening, may reflect colitis. 2.  Several enlarged perirectal lymph nodes. These are nonspecific and may  simply be reactive, but consider nonemergent proctoscopy to evaluate for rectal  mass lesion. 3.  Large nonobstructing left renal calculus. [EP]   0413 Potassium: 3.6 [EP]   1235 Magnesium: 2.1 [EP]   1235 HGB(!): 16.6 [EP]      ED Course User Index  [EP] Hansel France PA     3:31 PM  Pt has been reevaluated. There are no new complaints, changes, or physical findings at this time. All results have been reviewed with patient and/or family. Medications have been reviewed w/ pt and/or family. Pt and/or family's questions have been answered.  Pt and/or family expressed good understanding of the dx/tx/rx and is in agreement with plan of care. Pt instructed and agreed to f/u w/ GI and to return to ED upon further deterioration. Pt is ready for discharge. IMPRESSION:  1. Colitis    2. Proctitis    3. Diarrhea, unspecified type        PLAN:  1. Current Discharge Medication List      START taking these medications    Details   ciprofloxacin HCl (Cipro) 500 mg tablet Take 1 Tablet by mouth two (2) times a day for 10 days. Qty: 20 Tablet, Refills: 0  Start date: 4/24/2022, End date: 5/4/2022      metroNIDAZOLE (FlagyL) 500 mg tablet Take 1 Tablet by mouth three (3) times daily for 10 days. Qty: 30 Tablet, Refills: 0  Start date: 4/24/2022, End date: 5/4/2022           2.    Follow-up Information     Follow up With Specialties Details Why Contact Info    Your Primary Care Provider  Schedule an appointment as soon as possible for a visit       Harley Kahn MD Gastroenterology Schedule an appointment as soon as possible for a visit   [address] [facility] [address]  [phone]              Return to ED if worse     Procedures

## 2022-04-26 LAB
BACTERIA SPEC CULT: NORMAL
SERVICE CMNT-IMP: NORMAL

## 2022-05-21 ENCOUNTER — APPOINTMENT (OUTPATIENT)
Dept: CT IMAGING | Age: 67
DRG: 660 | End: 2022-05-21
Attending: EMERGENCY MEDICINE
Payer: MEDICARE

## 2022-05-21 ENCOUNTER — HOSPITAL ENCOUNTER (INPATIENT)
Age: 67
LOS: 13 days | Discharge: HOME | DRG: 660 | End: 2022-06-03
Attending: STUDENT IN AN ORGANIZED HEALTH CARE EDUCATION/TRAINING PROGRAM | Admitting: INTERNAL MEDICINE
Payer: MEDICARE

## 2022-05-21 DIAGNOSIS — K52.9 COLITIS: Primary | ICD-10-CM

## 2022-05-21 DIAGNOSIS — N20.0 KIDNEY STONE: ICD-10-CM

## 2022-05-21 PROBLEM — D72.825 BANDEMIA: Status: ACTIVE | Noted: 2022-05-21

## 2022-05-21 PROBLEM — K62.5 BRBPR (BRIGHT RED BLOOD PER RECTUM): Status: RESOLVED | Noted: 2021-05-26 | Resolved: 2022-05-21

## 2022-05-21 PROBLEM — E86.0 DEHYDRATION: Status: ACTIVE | Noted: 2022-05-21

## 2022-05-21 PROBLEM — E87.6 HYPOKALEMIA: Status: ACTIVE | Noted: 2022-05-21

## 2022-05-21 PROBLEM — R31.9 HEMATURIA: Status: RESOLVED | Noted: 2021-05-26 | Resolved: 2022-05-21

## 2022-05-21 LAB
ALBUMIN SERPL-MCNC: 2.8 G/DL (ref 3.5–5)
ALBUMIN/GLOB SERPL: 0.7 {RATIO} (ref 1.1–2.2)
ALP SERPL-CCNC: 64 U/L (ref 45–117)
ALT SERPL-CCNC: 13 U/L (ref 12–78)
ANION GAP SERPL CALC-SCNC: 10 MMOL/L (ref 5–15)
APPEARANCE UR: ABNORMAL
AST SERPL-CCNC: 11 U/L (ref 15–37)
BACTERIA URNS QL MICRO: ABNORMAL /HPF
BASOPHILS # BLD: 0 K/UL (ref 0–0.1)
BASOPHILS NFR BLD: 0 % (ref 0–1)
BILIRUB SERPL-MCNC: 0.5 MG/DL (ref 0.2–1)
BILIRUB UR QL: NEGATIVE
BUN SERPL-MCNC: 27 MG/DL (ref 6–20)
BUN/CREAT SERPL: 25 (ref 12–20)
C DIFF TOX GENS STL QL NAA+PROBE: POSITIVE
CALCIUM SERPL-MCNC: 9.1 MG/DL (ref 8.5–10.1)
CAMPYLOBACTER SPECIES, DNA: NEGATIVE
CAOX CRY URNS QL MICRO: ABNORMAL
CHLORIDE SERPL-SCNC: 108 MMOL/L (ref 97–108)
CO2 SERPL-SCNC: 26 MMOL/L (ref 21–32)
COLOR UR: YELLOW
COMMENT, HOLDF: NORMAL
CREAT SERPL-MCNC: 1.07 MG/DL (ref 0.55–1.02)
DIFFERENTIAL METHOD BLD: ABNORMAL
ENTEROTOXIGEN E COLI, DNA: NEGATIVE
EOSINOPHIL # BLD: 0.3 K/UL (ref 0–0.4)
EOSINOPHIL NFR BLD: 3 % (ref 0–7)
EPITH CASTS URNS QL MICRO: ABNORMAL /LPF
ERYTHROCYTE [DISTWIDTH] IN BLOOD BY AUTOMATED COUNT: 13.5 % (ref 11.5–14.5)
GLOBULIN SER CALC-MCNC: 4.2 G/DL (ref 2–4)
GLUCOSE SERPL-MCNC: 93 MG/DL (ref 65–100)
GLUCOSE UR STRIP.AUTO-MCNC: NEGATIVE MG/DL
GRAN CASTS URNS QL MICRO: ABNORMAL /LPF
HCT VFR BLD AUTO: 45.8 % (ref 35–47)
HGB BLD-MCNC: 15 G/DL (ref 11.5–16)
HGB UR QL STRIP: NEGATIVE
HYALINE CASTS URNS QL MICRO: >20 /LPF (ref 0–5)
IMM GRANULOCYTES # BLD AUTO: 0 K/UL
IMM GRANULOCYTES NFR BLD AUTO: 0 %
INTERPRETATION: ABNORMAL
KETONES UR QL STRIP.AUTO: ABNORMAL MG/DL
LEUKOCYTE ESTERASE UR QL STRIP.AUTO: ABNORMAL
LIPASE SERPL-CCNC: 42 U/L (ref 73–393)
LYMPHOCYTES # BLD: 2.5 K/UL (ref 0.8–3.5)
LYMPHOCYTES NFR BLD: 23 % (ref 12–49)
MAGNESIUM SERPL-MCNC: 1.8 MG/DL (ref 1.6–2.4)
MCH RBC QN AUTO: 31 PG (ref 26–34)
MCHC RBC AUTO-ENTMCNC: 32.8 G/DL (ref 30–36.5)
MCV RBC AUTO: 94.6 FL (ref 80–99)
METAMYELOCYTES NFR BLD MANUAL: 2 %
MONOCYTES # BLD: 0.6 K/UL (ref 0–1)
MONOCYTES NFR BLD: 6 % (ref 5–13)
MUCOUS THREADS URNS QL MICRO: ABNORMAL /LPF
NEUTS BAND NFR BLD MANUAL: 41 % (ref 0–6)
NEUTS SEG # BLD: 7.1 K/UL (ref 1.8–8)
NEUTS SEG NFR BLD: 25 % (ref 32–75)
NITRITE UR QL STRIP.AUTO: NEGATIVE
NRBC # BLD: 0 K/UL (ref 0–0.01)
NRBC BLD-RTO: 0 PER 100 WBC
P SHIGELLOIDES DNA STL QL NAA+PROBE: NEGATIVE
PCR REFLEX: ABNORMAL
PH UR STRIP: 5 [PH] (ref 5–8)
PLATELET # BLD AUTO: 292 K/UL (ref 150–400)
PMV BLD AUTO: 12.1 FL (ref 8.9–12.9)
POTASSIUM SERPL-SCNC: 3 MMOL/L (ref 3.5–5.1)
PROT SERPL-MCNC: 7 G/DL (ref 6.4–8.2)
PROT UR STRIP-MCNC: 30 MG/DL
RBC # BLD AUTO: 4.84 M/UL (ref 3.8–5.2)
RBC #/AREA URNS HPF: ABNORMAL /HPF (ref 0–5)
RBC MORPH BLD: ABNORMAL
SALMONELLA SPECIES, DNA: NEGATIVE
SAMPLES BEING HELD,HOLD: NORMAL
SHIGA TOXIN PRODUCING, DNA: NEGATIVE
SHIGELLA SP+EIEC IPAH STL QL NAA+PROBE: NEGATIVE
SODIUM SERPL-SCNC: 144 MMOL/L (ref 136–145)
SP GR UR REFRACTOMETRY: 1.02 (ref 1–1.03)
UR CULT HOLD, URHOLD: NORMAL
UROBILINOGEN UR QL STRIP.AUTO: 0.2 EU/DL (ref 0.2–1)
VIBRIO SPECIES, DNA: NEGATIVE
WBC # BLD AUTO: 10.7 K/UL (ref 3.6–11)
WBC MORPH BLD: ABNORMAL
WBC URNS QL MICRO: ABNORMAL /HPF (ref 0–4)
Y. ENTEROCOLITICA, DNA: NEGATIVE

## 2022-05-21 PROCEDURE — 81001 URINALYSIS AUTO W/SCOPE: CPT

## 2022-05-21 PROCEDURE — 87086 URINE CULTURE/COLONY COUNT: CPT

## 2022-05-21 PROCEDURE — 99285 EMERGENCY DEPT VISIT HI MDM: CPT

## 2022-05-21 PROCEDURE — 87506 IADNA-DNA/RNA PROBE TQ 6-11: CPT

## 2022-05-21 PROCEDURE — 74176 CT ABD & PELVIS W/O CONTRAST: CPT

## 2022-05-21 PROCEDURE — 87493 C DIFF AMPLIFIED PROBE: CPT

## 2022-05-21 PROCEDURE — 36415 COLL VENOUS BLD VENIPUNCTURE: CPT

## 2022-05-21 PROCEDURE — 80053 COMPREHEN METABOLIC PANEL: CPT

## 2022-05-21 PROCEDURE — 74011250636 HC RX REV CODE- 250/636: Performed by: EMERGENCY MEDICINE

## 2022-05-21 PROCEDURE — 74011250636 HC RX REV CODE- 250/636: Performed by: INTERNAL MEDICINE

## 2022-05-21 PROCEDURE — 83690 ASSAY OF LIPASE: CPT

## 2022-05-21 PROCEDURE — 65270000029 HC RM PRIVATE

## 2022-05-21 PROCEDURE — 85025 COMPLETE CBC W/AUTO DIFF WBC: CPT

## 2022-05-21 PROCEDURE — 83735 ASSAY OF MAGNESIUM: CPT

## 2022-05-21 PROCEDURE — 74011000250 HC RX REV CODE- 250: Performed by: EMERGENCY MEDICINE

## 2022-05-21 PROCEDURE — 87324 CLOSTRIDIUM AG IA: CPT

## 2022-05-21 RX ORDER — ONDANSETRON 4 MG/1
4 TABLET, ORALLY DISINTEGRATING ORAL
Status: DISCONTINUED | OUTPATIENT
Start: 2022-05-21 | End: 2022-05-21

## 2022-05-21 RX ORDER — POLYETHYLENE GLYCOL 3350 17 G/17G
17 POWDER, FOR SOLUTION ORAL DAILY PRN
Status: DISCONTINUED | OUTPATIENT
Start: 2022-05-21 | End: 2022-06-03 | Stop reason: HOSPADM

## 2022-05-21 RX ORDER — SODIUM CHLORIDE 9 MG/ML
1000 INJECTION, SOLUTION INTRAVENOUS ONCE
Status: COMPLETED | OUTPATIENT
Start: 2022-05-21 | End: 2022-05-21

## 2022-05-21 RX ORDER — MORPHINE SULFATE 2 MG/ML
2 INJECTION, SOLUTION INTRAMUSCULAR; INTRAVENOUS
Status: DISCONTINUED | OUTPATIENT
Start: 2022-05-21 | End: 2022-05-22

## 2022-05-21 RX ORDER — MORPHINE SULFATE 2 MG/ML
INJECTION, SOLUTION INTRAMUSCULAR; INTRAVENOUS
Status: DISPENSED
Start: 2022-05-21 | End: 2022-05-22

## 2022-05-21 RX ORDER — ACETAMINOPHEN 325 MG/1
650 TABLET ORAL
Status: DISCONTINUED | OUTPATIENT
Start: 2022-05-21 | End: 2022-06-03 | Stop reason: HOSPADM

## 2022-05-21 RX ORDER — ONDANSETRON 2 MG/ML
4 INJECTION INTRAMUSCULAR; INTRAVENOUS
Status: DISCONTINUED | OUTPATIENT
Start: 2022-05-21 | End: 2022-06-03 | Stop reason: HOSPADM

## 2022-05-21 RX ORDER — METRONIDAZOLE 500 MG/100ML
500 INJECTION, SOLUTION INTRAVENOUS
Status: DISCONTINUED | OUTPATIENT
Start: 2022-05-21 | End: 2022-05-21 | Stop reason: SDUPTHER

## 2022-05-21 RX ORDER — METRONIDAZOLE 500 MG/100ML
500 INJECTION, SOLUTION INTRAVENOUS EVERY 12 HOURS
Status: DISCONTINUED | OUTPATIENT
Start: 2022-05-21 | End: 2022-05-22

## 2022-05-21 RX ORDER — ENOXAPARIN SODIUM 100 MG/ML
40 INJECTION SUBCUTANEOUS DAILY
Status: DISCONTINUED | OUTPATIENT
Start: 2022-05-22 | End: 2022-06-03 | Stop reason: HOSPADM

## 2022-05-21 RX ORDER — DEXTROSE, SODIUM CHLORIDE, AND POTASSIUM CHLORIDE 5; .45; .3 G/100ML; G/100ML; G/100ML
INJECTION INTRAVENOUS CONTINUOUS
Status: DISCONTINUED | OUTPATIENT
Start: 2022-05-21 | End: 2022-05-24

## 2022-05-21 RX ORDER — LEVOFLOXACIN 5 MG/ML
750 INJECTION, SOLUTION INTRAVENOUS
Status: DISCONTINUED | OUTPATIENT
Start: 2022-05-21 | End: 2022-05-22

## 2022-05-21 RX ORDER — ACETAMINOPHEN 650 MG/1
650 SUPPOSITORY RECTAL
Status: DISCONTINUED | OUTPATIENT
Start: 2022-05-21 | End: 2022-05-21

## 2022-05-21 RX ORDER — MORPHINE SULFATE 2 MG/ML
2 INJECTION, SOLUTION INTRAMUSCULAR; INTRAVENOUS
Status: COMPLETED | OUTPATIENT
Start: 2022-05-21 | End: 2022-05-21

## 2022-05-21 RX ADMIN — LEVOFLOXACIN 750 MG: 750 INJECTION, SOLUTION INTRAVENOUS at 16:28

## 2022-05-21 RX ADMIN — METRONIDAZOLE 500 MG: 500 INJECTION, SOLUTION INTRAVENOUS at 16:18

## 2022-05-21 RX ADMIN — POTASSIUM CHLORIDE, DEXTROSE MONOHYDRATE AND SODIUM CHLORIDE: 300; 5; 450 INJECTION, SOLUTION INTRAVENOUS at 16:15

## 2022-05-21 RX ADMIN — SODIUM CHLORIDE 1000 ML: 9 INJECTION, SOLUTION INTRAVENOUS at 12:27

## 2022-05-21 RX ADMIN — CEFTRIAXONE 1 G: 1 INJECTION, POWDER, FOR SOLUTION INTRAMUSCULAR; INTRAVENOUS at 15:23

## 2022-05-21 RX ADMIN — ONDANSETRON 4 MG: 2 INJECTION INTRAMUSCULAR; INTRAVENOUS at 16:26

## 2022-05-21 RX ADMIN — MORPHINE SULFATE 2 MG: 2 INJECTION, SOLUTION INTRAMUSCULAR; INTRAVENOUS at 16:11

## 2022-05-21 RX ADMIN — ONDANSETRON 4 MG: 2 INJECTION INTRAMUSCULAR; INTRAVENOUS at 21:00

## 2022-05-21 RX ADMIN — MORPHINE SULFATE 2 MG: 2 INJECTION, SOLUTION INTRAMUSCULAR; INTRAVENOUS at 20:37

## 2022-05-21 NOTE — ED PROVIDER NOTES
Patient is a 69-year-old female presents to ER for persistent rectal bleeding and diarrhea. Past medical hx significant for cervical cancer, Lyme's disease, and diarrhea. Patient reports that she has been having diarrhea and bloody stools for the past 1 year. She is followed by Dr. Pranay Velasquez with GI. Pt had colonoscopy 1 month ago and was told d she had proctitis. Pt took steroids and suppositories. Pt was seen in this ER 1 month ago for the same. She had CT scan which showed colitis. Pt was discharged home with flagyl and cipro. Diarrhea continues as does her rectal bleeding. Pt feels she needs to be admitted for IV antibiotics and treatment as she has had similar infections in past. Pt reports continued abdominal cramping. Pt reports 2 weeks of nausea and vomiting. No fever, chills, chest pain, shortness of breath. Pt reports no alleviating factors. Social hx  +smoker  +alcohol           The history is provided by the patient and medical records. Rectal Bleeding  Associated symptoms include abdominal pain. Pertinent negatives include no chest pain, no headaches and no shortness of breath. Past Medical History:   Diagnosis Date    Cancer [facility])     cervical cancer    Cancer ([facility] [address]     skin cancer    Ill-defined condition     lyme disease       Past Surgical History:   Procedure Laterality Date    HX GI      isaias colectomy    HX GYN      partial hysterectomy    HX GYN      c section    HX ORTHOPAEDIC      RIGHT shoulder surgery    HX ORTHOPAEDIC      RIGHT foot surgery    NEUROLOGICAL PROCEDURE UNLISTED      lumber surgery         No family history on file.     Social History     Socioeconomic History    Marital status: SINGLE     Spouse name: Not on file    Number of children: Not on file    Years of education: Not on file    Highest education level: Not on file   Occupational History    Not on file   Tobacco Use    Smoking status: Current Every Day Smoker     Packs/day: 1.00    Smokeless tobacco: Never Used   Substance and Sexual Activity    Alcohol use: Yes     Alcohol/week: 10.0 standard drinks     Types: 10 Shots of liquor per week    Drug use: Not on file    Sexual activity: Not on file   Other Topics Concern    Not on file   Social History Narrative    Not on file     Social Determinants of Health     Financial Resource Strain:     Difficulty of Paying Living Expenses: Not on file   Food Insecurity:     Worried About Running Out of Food in the Last Year: Not on file    Paddy of Food in the Last Year: Not on file   Transportation Needs:     Lack of Transportation (Medical): Not on file    Lack of Transportation (Non-Medical): Not on file   Physical Activity:     Days of Exercise per Week: Not on file    Minutes of Exercise per Session: Not on file   Stress:     Feeling of Stress : Not on file   Social Connections:     Frequency of Communication with Friends and Family: Not on file    Frequency of Social Gatherings with Friends and Family: Not on file    Attends Denominational Services: Not on file    Active Member of [address] [place] [address] or Organizations: Not on file    Attends Club or Organization Meetings: Not on file    Marital Status: Not on file   Intimate Partner Violence:     Fear of Current or Ex-Partner: Not on file    Emotionally Abused: Not on file    Physically Abused: Not on file    Sexually Abused: Not on file   Housing Stability:     Unable to Pay for Housing in the Last Year: Not on file    Number of [place] in the Last Year: Not on file    Unstable Housing in the Last Year: Not on file         ALLERGIES: Darvocet a500 [propoxyphene n-acetaminophen], Iodine, Prednisone, Seafood, and Sulfa (sulfonamide antibiotics)    Review of Systems   Constitutional: Negative for chills and fever. Respiratory: Negative for cough and shortness of breath. Cardiovascular: Negative for chest pain and palpitations.    Gastrointestinal: Positive for abdominal pain, anal bleeding, diarrhea, nausea and vomiting. Negative for blood in stool. Genitourinary: Negative for dysuria, flank pain, frequency and urgency. Musculoskeletal: Negative for arthralgias, myalgias, neck pain and neck stiffness. Skin: Negative for rash and wound. Neurological: Negative for dizziness, numbness and headaches. All other systems reviewed and are negative. Vitals:    05/21/22 1154 05/21/22 1156   BP:  101/66   Pulse: (!) 114 (!) 105   Resp: 19    Temp: 98 °F (36.7 °C)    SpO2: 96%    Weight: 52.2 kg (115 lb)    Height: 5' 1\" (1.549 m)             Physical Exam  Vitals and nursing note reviewed. Constitutional:       Appearance: She is well-developed. She is not ill-appearing. HENT:      Head: Normocephalic and atraumatic. Eyes:      Pupils: Pupils are equal, round, and reactive to light. Cardiovascular:      Rate and Rhythm: Normal rate and regular rhythm. Pulmonary:      Effort: Pulmonary effort is normal.      Breath sounds: Normal breath sounds. Abdominal:      General: Bowel sounds are normal. There is no distension or abdominal bruit. Palpations: Abdomen is soft. Abdomen is not rigid. There is no fluid wave, hepatomegaly, splenomegaly or mass. Tenderness: There is generalized abdominal tenderness. There is no right CVA tenderness, left CVA tenderness, guarding or rebound. Hernia: No hernia is present. Comments: Abdomen exposed for exam.  Soft, no peritoneal signs. Pt tender diffusely with no focal point of pain. Musculoskeletal:         General: Normal range of motion. Cervical back: Normal range of motion and neck supple. Skin:     General: Skin is warm and dry. Capillary Refill: Capillary refill takes less than 2 seconds. Findings: No erythema or rash. Neurological:      General: No focal deficit present. Mental Status: She is alert and oriented to person, place, and time.    Psychiatric:         Mood and Affect: Mood normal. Behavior: Behavior normal.         Thought Content: Thought content normal.         Judgment: Judgment normal.          MDM  Number of Diagnoses or Management Options  Colitis  Kidney stone  Diagnosis management comments: 74 yo female presenting to ER for evaluation of persistent diarrhea and rectal bleeding. Diffuse tenderness to palpation on exam. No focal point of pain. No fever. Lungs clear. No peritoneal signs. P: CT, labs, ua    2:05 PM  IMPRESSION  Left hydronephrosis and hydroureter secondary to a proximal ureteral 10 mm  calculus. Nonobstructing left renal calculus. Colitis of the left transverse colon as well as the rectum and lower sigmoid    Pt with 10mm stone and persistent colitis despite oral treatment. Will admit for iv antibiotics. And management of colitis and kideny stone. 2:14 PM  I have spoken with hospitalist via [place]. . Discussed patient's presentation, history, physical assessment, and available diagnostic results. They will write orders and admit the patient to the hospital. All available results have been reviewed with the patient and any available family. Care plan has been outlined and questions have been answered. Patient and any available family understands and agrees to need for admission to hospital for further treatment not available in ED. Patient is ready for admission. 3:58 PM  Pt still has not urinated. Amount and/or Complexity of Data Reviewed  Discuss the patient with other providers: yes (ER attending-[place])    Patient Progress  Patient progress: stable         Procedures      Perfect Serve Consult for Admission  2:09 PM    ED Room Number: RY55/99  Patient Name and age:  Karen Zhao 75 y.o.  female  Working Diagnosis:   1. Colitis    2.  Kidney stone        COVID-19 Suspicion:  no  Sepsis present:  no  Reassessment needed: no  Code Status:  Full Code  Readmission: no  Isolation Requirements:  no  Recommended Level of Care: med/surg  Department:[facility] ED - [phone]  Other:    Pt with persistent colitis, abdominal pain and rectal bleeding. Hemodynamically stable. CT with continued colitis despite cipro and flagyl and incidental finding of 10 mm proximal uretal stone. Has been admitted in past for IV antibiotics due to colitis. Pt case including HPI, PE, and all available lab and radiology results has been discussed with attending physician. Opportunity to evaluate patient has been provided to ER attending.

## 2022-05-21 NOTE — PROGRESS NOTES
[facility] Dosing Services: 5/21/22    Pharmacist Renal Dosing Progress Note for levaquin   Physician Dr. Kareen Cobb    The following medication: levaquin was automatically dose-adjusted per [facility] P&T Committee Protocol, with respect to renal function. Consult provided for this   75 y.o. , female , for the indication of intra-abdominal.    Pt Weight:   Wt Readings from Last 1 Encounters:   05/21/22 52.2 kg (115 lb)         Previous Regimen Levaquin 750mg IV every 24 hours   Serum Creatinine Lab Results   Component Value Date/Time    Creatinine 1.07 (H) 05/21/2022 12:23 PM       Creatinine Clearance Estimated Creatinine Clearance: 38 mL/min (A) (based on SCr of 1.07 mg/dL (H)). BUN Lab Results   Component Value Date/Time    BUN 27 (H) 05/21/2022 12:23 PM       Dosage changed to:  levaquin 750mg IV every 48 hours for CrCl 20-49mL/min    Pharmacy automatically adjusted metronidazole to 500 mg every 12 hours per P&T policy. Patient does not have any of the following exclusions:  -C. Difficile  -CNS infection  -non-anaerobic infections (giardiasis, trichomonas, H. Pylori, etc)      Pharmacy to continue to monitor patient daily. Will make dosage adjustments based upon changing renal function. Signed Dayron Juárez.  Contact information:  [phone]

## 2022-05-21 NOTE — H&P
[facility]    Hospitalist Admission Note      NAME:  Evelyn Samuel   :   1955   MRN:  [id number]     PCP:  None     Date/Time of service:  2022 2:25 PM          Subjective:     CHIEF COMPLAINT: abd pain     HISTORY OF PRESENT ILLNESS:     Ms. Jose Stone is a 75 y.o.  female who presented to the Emergency Department complaining of abd pain. She is a poor historian. Abd pain for a year. She states diarrhea 30 times a day for a year. Colonoscopy by Dr Dayron Richardson about 6 weeks ago. Hx of colitis and failed to improve on outpatient PO ABx. In ER we find bandemia but no other SIRS criteria. She reports continuous GI bleeding for a year, but we find no anemia. Colitis suggested by non contrast CT. Nephrolithiasis/hydronephrosis on CT. We will admit her for management. Past Medical History:   Diagnosis Date    Hx of cervical cancer     Hx of Lyme disease     Hx of skin cancer, basal cell         Past Surgical History:   Procedure Laterality Date    HX GI      isaias colectomy    HX GYN      partial hysterectomy    HX GYN      c section    HX ORTHOPAEDIC      RIGHT shoulder surgery    HX ORTHOPAEDIC      RIGHT foot surgery    NEUROLOGICAL PROCEDURE UNLISTED      lumber surgery       Social History     Tobacco Use    Smoking status: Current Every Day Smoker     Packs/day: 1.00    Smokeless tobacco: Never Used   Substance Use Topics    Alcohol use: Yes     Alcohol/week: 10.0 standard drinks     Types: 10 Shots of liquor per week        No family history on file.    Family hx cannot be fully assessed, since the patient cannot provide information    Allergies   Allergen Reactions    Darvocet A500 [Propoxyphene N-Acetaminophen] Shortness of Breath    Iodine Hives    Prednisone Nausea and Vomiting     \"All steroid cause upset stomach\"    Seafood Hives    Sulfa (Sulfonamide Antibiotics) Nausea and Vomiting        Prior to Admission medications    Medication Sig Start Date End Date Taking? Authorizing Provider   cholecalciferol (Vitamin D3) (1000 Units /25 mcg) tablet Take 1,000 Units by mouth daily. Provider, Historical   aspirin 81 mg chewable tablet Take 1 Tab by mouth daily.  10/19/17   Sonia Pierre MD       Review of Systems:  (bold if positive, if negative)    Gen:  fatigueEyes:  ENT:  CVS:  Pulm:  GI:  Abdominal pain, nausea,:  MS:  Skin:  Psych:  depression, anxietyEndo:  Hem:  Renal:  Neuro:  weakness      Objective:      VITALS:    Vital signs reviewed; most recent are:    Visit Vitals  /66   Pulse (!) 105   Temp 98 °F (36.7 °C)   Resp 19   Ht 5' 1\" (1.549 m)   Wt 52.2 kg (115 lb)   SpO2 96%   BMI 21.73 kg/m²     SpO2 Readings from Last 6 Encounters:   05/21/22 96%   04/24/22 95%   05/28/21 95%   10/18/17 100%        No intake or output data in the 24 hours ending 05/21/22 1425     Exam:     Physical Exam:    Gen:  Thin, frail, in acute emotional distress  HEENT:  Pink conjunctivae, PERRL, hearing intact to voice, dry mucous membranes  Neck:  Supple, without masses, thyroid non-tender  Resp:  No accessory muscle use, clear breath sounds without wheezes rales or rhonchi  Card:  No murmurs, tachycardic S1, S2 without thrills, bruits or peripheral edema  Abd:  Soft, tender, non-distended, normoactive bowel sounds are present, no mass  Lymph:  No cervical or inguinal adenopathy  Musc:  No cyanosis or clubbing  Skin:  No rashes or ulcers, skin turgor is reduced  Neuro:  Cranial nerves are grossly intact, mild motor weakness, follows commands   Psych:  Poor insight, oriented to person, place, anxious, tangential     Labs:    Recent Labs     05/21/22  1223   WBC 10.7   HGB 15.0   HCT 45.8        Recent Labs     05/21/22  1223      K 3.0*      CO2 26   GLU 93   BUN 27*   CREA 1.07*   CA 9.1   MG 1.8   ALB 2.8*   TBILI 0.5   ALT 13     Lab Results   Component Value Date/Time    Glucose (POC) 93 10/16/2017 09:19 AM    Glucose (POC) 116 (H) 10/15/2017 02:12 PM     No results for input(s): PH, PCO2, PO2, HCO3, FIO2 in the last 72 hours. No results for input(s): INR, INREXT in the last 72 hours. All Micro Results     Procedure Component Value Units Date/Time    C. DIFFICILE AG & TOXIN A/B [[id number]] Collected: 05/21/22 1344    Order Status: Completed Specimen: Stool Updated: 05/21/22 1404    ENTERIC BACTERIA PANEL, DNA [[id number]] Collected: 05/21/22 1344    Order Status: Completed Specimen: Stool Updated: 05/21/22 1403    URINE CULTURE HOLD SAMPLE [[id number]]     Order Status: Sent Specimen: Urine           I have reviewed previous records       Assessment and Plan:      Colitis - POA, suggested by CT and sepsis. Start Levaquin and flagyl by IV. Check procalcitonin and stool studies. Consulted GI      Sepsis / Bandemia / Sinus tachycardia - POA, presumed due to colitis, vs pyelo, vs other. Follow blood Cx. UA bland so far. Abx as above. Renal insufficiency / Hypokalemia / Dehydration - POA due to poor PO intake. Hydrate, replete K and follow    Nephrolithiasis / Hydronephrosis / Flank pain - POA, 1.0 cm calculous L UPJ with hydro. UA unremarkable. Urology consult.       Telemetry reviewed:   normal sinus rhythm    Risk of deterioration: high      Total time spent with patient: 48 Minutes I personally reviewed chart, notes, data and current medications in the medical record. I have personally examined and treated the patient at bedside during this period.                  Care Plan discussed with: Patient, Care Manager, Nursing Staff and >50% of time spent in counseling and coordination of care    Discussed:  Care Plan and D/C Planning       ___________________________________________________    Attending Physician: Argentina Zavala MD

## 2022-05-21 NOTE — ED TRIAGE NOTES
Patient reports rectal bleeding for over 1 year. Reports GI doctor states that she was diagnosed with infection in her rectum. Reports finished her antibiotics and now developed nausea, weakness, dizziness, diarrhea.  Reports abd pain

## 2022-05-22 PROBLEM — A04.72 CLOSTRIDIUM DIFFICILE DIARRHEA: Status: ACTIVE | Noted: 2022-05-22

## 2022-05-22 LAB
ALBUMIN SERPL-MCNC: 2.1 G/DL (ref 3.5–5)
ALBUMIN/GLOB SERPL: 0.6 {RATIO} (ref 1.1–2.2)
ALP SERPL-CCNC: 52 U/L (ref 45–117)
ALT SERPL-CCNC: 11 U/L (ref 12–78)
ANION GAP SERPL CALC-SCNC: 9 MMOL/L (ref 5–15)
AST SERPL-CCNC: 10 U/L (ref 15–37)
BILIRUB SERPL-MCNC: 0.3 MG/DL (ref 0.2–1)
BUN SERPL-MCNC: 17 MG/DL (ref 6–20)
BUN/CREAT SERPL: 21 (ref 12–20)
CALCIUM SERPL-MCNC: 8 MG/DL (ref 8.5–10.1)
CHLORIDE SERPL-SCNC: 112 MMOL/L (ref 97–108)
CO2 SERPL-SCNC: 22 MMOL/L (ref 21–32)
CREAT SERPL-MCNC: 0.81 MG/DL (ref 0.55–1.02)
CRP SERPL-MCNC: 7.67 MG/DL (ref 0–0.6)
ERYTHROCYTE [DISTWIDTH] IN BLOOD BY AUTOMATED COUNT: 13.6 % (ref 11.5–14.5)
GLOBULIN SER CALC-MCNC: 3.5 G/DL (ref 2–4)
GLUCOSE SERPL-MCNC: 102 MG/DL (ref 65–100)
HCT VFR BLD AUTO: 38.6 % (ref 35–47)
HGB BLD-MCNC: 12.8 G/DL (ref 11.5–16)
MAGNESIUM SERPL-MCNC: 1.4 MG/DL (ref 1.6–2.4)
MCH RBC QN AUTO: 30.9 PG (ref 26–34)
MCHC RBC AUTO-ENTMCNC: 33.2 G/DL (ref 30–36.5)
MCV RBC AUTO: 93.2 FL (ref 80–99)
NRBC # BLD: 0 K/UL (ref 0–0.01)
NRBC BLD-RTO: 0 PER 100 WBC
PLATELET # BLD AUTO: 211 K/UL (ref 150–400)
PMV BLD AUTO: 11.7 FL (ref 8.9–12.9)
POTASSIUM SERPL-SCNC: 3.2 MMOL/L (ref 3.5–5.1)
PROCALCITONIN SERPL-MCNC: <0.05 NG/ML
PROT SERPL-MCNC: 5.6 G/DL (ref 6.4–8.2)
RBC # BLD AUTO: 4.14 M/UL (ref 3.8–5.2)
SODIUM SERPL-SCNC: 143 MMOL/L (ref 136–145)
WBC # BLD AUTO: 9.7 K/UL (ref 3.6–11)

## 2022-05-22 PROCEDURE — 80053 COMPREHEN METABOLIC PANEL: CPT

## 2022-05-22 PROCEDURE — 74011250636 HC RX REV CODE- 250/636: Performed by: INTERNAL MEDICINE

## 2022-05-22 PROCEDURE — 86140 C-REACTIVE PROTEIN: CPT

## 2022-05-22 PROCEDURE — 84145 PROCALCITONIN (PCT): CPT

## 2022-05-22 PROCEDURE — 83735 ASSAY OF MAGNESIUM: CPT

## 2022-05-22 PROCEDURE — 74011250637 HC RX REV CODE- 250/637: Performed by: INTERNAL MEDICINE

## 2022-05-22 PROCEDURE — 85027 COMPLETE CBC AUTOMATED: CPT

## 2022-05-22 PROCEDURE — 36415 COLL VENOUS BLD VENIPUNCTURE: CPT

## 2022-05-22 PROCEDURE — 65270000029 HC RM PRIVATE

## 2022-05-22 RX ORDER — HYDROCORTISONE ACETATE 25 MG/1
25 SUPPOSITORY RECTAL EVERY 12 HOURS
Status: DISCONTINUED | OUTPATIENT
Start: 2022-05-22 | End: 2022-05-24

## 2022-05-22 RX ORDER — PROCHLORPERAZINE EDISYLATE 5 MG/ML
5 INJECTION INTRAMUSCULAR; INTRAVENOUS
Status: DISCONTINUED | OUTPATIENT
Start: 2022-05-22 | End: 2022-06-03 | Stop reason: HOSPADM

## 2022-05-22 RX ORDER — VANCOMYCIN HYDROCHLORIDE 250 MG/5ML
125 POWDER, FOR SOLUTION ORAL EVERY 6 HOURS
Status: DISCONTINUED | OUTPATIENT
Start: 2022-05-22 | End: 2022-05-23

## 2022-05-22 RX ORDER — METRONIDAZOLE 500 MG/100ML
500 INJECTION, SOLUTION INTRAVENOUS EVERY 8 HOURS
Status: DISCONTINUED | OUTPATIENT
Start: 2022-05-22 | End: 2022-05-22

## 2022-05-22 RX ORDER — MORPHINE SULFATE 2 MG/ML
2 INJECTION, SOLUTION INTRAMUSCULAR; INTRAVENOUS
Status: DISCONTINUED | OUTPATIENT
Start: 2022-05-22 | End: 2022-05-24

## 2022-05-22 RX ADMIN — ONDANSETRON 4 MG: 2 INJECTION INTRAMUSCULAR; INTRAVENOUS at 02:04

## 2022-05-22 RX ADMIN — ONDANSETRON 4 MG: 2 INJECTION INTRAMUSCULAR; INTRAVENOUS at 18:17

## 2022-05-22 RX ADMIN — METRONIDAZOLE 500 MG: 500 INJECTION, SOLUTION INTRAVENOUS at 10:12

## 2022-05-22 RX ADMIN — Medication 1 CAPSULE: at 10:12

## 2022-05-22 RX ADMIN — POTASSIUM CHLORIDE, DEXTROSE MONOHYDRATE AND SODIUM CHLORIDE: 300; 5; 450 INJECTION, SOLUTION INTRAVENOUS at 15:04

## 2022-05-22 RX ADMIN — VANCOMYCIN HYDROCHLORIDE 125 MG: 250 POWDER, FOR SOLUTION ORAL at 17:38

## 2022-05-22 RX ADMIN — MORPHINE SULFATE 2 MG: 2 INJECTION, SOLUTION INTRAMUSCULAR; INTRAVENOUS at 21:42

## 2022-05-22 RX ADMIN — MORPHINE SULFATE 2 MG: 2 INJECTION, SOLUTION INTRAMUSCULAR; INTRAVENOUS at 00:25

## 2022-05-22 RX ADMIN — VANCOMYCIN HYDROCHLORIDE 125 MG: 250 POWDER, FOR SOLUTION ORAL at 13:10

## 2022-05-22 RX ADMIN — PROCHLORPERAZINE EDISYLATE 5 MG: 5 INJECTION INTRAMUSCULAR; INTRAVENOUS at 21:43

## 2022-05-22 RX ADMIN — PROCHLORPERAZINE EDISYLATE 5 MG: 5 INJECTION INTRAMUSCULAR; INTRAVENOUS at 13:22

## 2022-05-22 RX ADMIN — MORPHINE SULFATE 2 MG: 2 INJECTION, SOLUTION INTRAMUSCULAR; INTRAVENOUS at 04:08

## 2022-05-22 RX ADMIN — MORPHINE SULFATE 2 MG: 2 INJECTION, SOLUTION INTRAMUSCULAR; INTRAVENOUS at 18:17

## 2022-05-22 RX ADMIN — ONDANSETRON 4 MG: 2 INJECTION INTRAMUSCULAR; INTRAVENOUS at 10:12

## 2022-05-22 RX ADMIN — ENOXAPARIN SODIUM 40 MG: 100 INJECTION SUBCUTANEOUS at 10:12

## 2022-05-22 RX ADMIN — MORPHINE SULFATE 2 MG: 2 INJECTION, SOLUTION INTRAMUSCULAR; INTRAVENOUS at 13:10

## 2022-05-22 RX ADMIN — POTASSIUM CHLORIDE, DEXTROSE MONOHYDRATE AND SODIUM CHLORIDE: 300; 5; 450 INJECTION, SOLUTION INTRAVENOUS at 02:20

## 2022-05-22 RX ADMIN — MORPHINE SULFATE 2 MG: 2 INJECTION, SOLUTION INTRAMUSCULAR; INTRAVENOUS at 10:11

## 2022-05-22 RX ADMIN — METRONIDAZOLE 500 MG: 500 INJECTION, SOLUTION INTRAVENOUS at 02:04

## 2022-05-22 NOTE — CONSULTS
[facility]  [phone] office   Gastroenterology Consultation Note      Admit Date: 5/21/2022  Consult Date: 5/22/2022   I greatly appreciate your asking me to see Shahida Rose, thank you very much for the opportunity to participate in her care. Narrative Assessment and Plan   · C. Difficile associated diarrhea in patient with recently diagnosed ulcerative proctitis. Agree with oral vancomycin, will discontinue Flagyl as it is no longer indicated for treatment of C. Difficile. Will start hydrocortisone suppositories twice daily. Further medication changes may be needed pending clinical course. Subjective:     Chief Complaint: diarrhea and abdominal pain    History of Present Illness: Mrs. Rhiannon Staton is a 59-year-old female who in March was diagnosed with ulcerative proctitis and follows with my partner Dr. Rishabh Hsu.  She was initially placed on prednisone taper and mesalamine suppositories. More recently she notes worsening GI symptoms which include abdominal pain, nausea, and diarrhea. She tells me she has had 15 bowel movements already today, most of which are watery. Blood noted as well. She is not anemic though inflammatory markers are elevated. Stool positive for C. Difficile, she has been started on oral vancomycin and IV Flagyl. CT scan shows diffuse thickening up to the rectosigmoid junction as well as some thickening within the transverse colon.     PCP:  None    Past Medical History:   Diagnosis Date    Hx of cervical cancer     Hx of Lyme disease     Hx of skin cancer, basal cell         Past Surgical History:   Procedure Laterality Date    HX GI      isaias colectomy    HX GYN      partial hysterectomy    HX GYN      c section    HX ORTHOPAEDIC      RIGHT shoulder surgery    HX ORTHOPAEDIC      RIGHT foot surgery    NEUROLOGICAL PROCEDURE UNLISTED      lumber surgery       Social History     Tobacco Use    Smoking status: Current Every Day Smoker Packs/day: 1.00    Smokeless tobacco: Never Used   Substance Use Topics    Alcohol use: Yes     Alcohol/week: 10.0 standard drinks     Types: 10 Shots of liquor per week        No family history on file. Allergies   Allergen Reactions    Darvocet A500 [Propoxyphene N-Acetaminophen] Shortness of Breath    Iodine Hives    Prednisone Nausea and Vomiting     \"All steroid cause upset stomach\"    Seafood Hives    Sulfa (Sulfonamide Antibiotics) Nausea and Vomiting            Home Medications:  Prior to Admission Medications   Prescriptions Last Dose Informant Patient Reported? Taking?   aspirin 81 mg chewable tablet Unknown at Unknown time  No No   Sig: Take 1 Tab by mouth daily. cholecalciferol (Vitamin D3) (1000 Units /25 mcg) tablet Unknown at Unknown time  Yes No   Sig: Take 1,000 Units by mouth daily.       Facility-Administered Medications: None       Hospital Medications:  Current Facility-Administered Medications   Medication Dose Route Frequency    vancomycin (FIRVANQ) 50 mg/mL oral solution 125 mg  125 mg Oral Q6H    metroNIDAZOLE (FLAGYL) IVPB premix 500 mg  500 mg IntraVENous Q8H    L.acidophilus-paracasei-S.thermophil-bifidobacter (RISAQUAD) 8 billion cell capsule  1 Capsule Oral DAILY    prochlorperazine (COMPAZINE) injection 5 mg  5 mg IntraVENous Q6H PRN    morphine injection 2 mg  2 mg IntraVENous Q3H PRN    dextrose 5% - 0.45% NaCl with KCl 40 mEq/L infusion   IntraVENous CONTINUOUS    acetaminophen (TYLENOL) tablet 650 mg  650 mg Oral Q6H PRN    polyethylene glycol (MIRALAX) packet 17 g  17 g Oral DAILY PRN    ondansetron (ZOFRAN) injection 4 mg  4 mg IntraVENous Q6H PRN    enoxaparin (LOVENOX) injection 40 mg  40 mg SubCUTAneous DAILY       Review of Systems: Admission ROS by Goran Juarez MD from 5/21/2022 were reviewed with the patient and changes (other than per HPI) include: none      Objective:     Physical Exam:  Visit Vitals  /71 (BP 1 Location: Right upper arm, BP Patient Position: At rest)   Pulse (!) 113   Temp 100.3 °F (37.9 °C)   Resp 18   Ht 5' 1\" (1.549 m)   Wt 60.4 kg (133 lb 2.5 oz)   SpO2 94%   BMI 25.16 kg/m²     SpO2 Readings from Last 6 Encounters:   05/22/22 94%   04/24/22 95%   05/28/21 95%   10/18/17 100%            Intake/Output Summary (Last 24 hours) at 5/22/2022 1519  Last data filed at 5/22/2022 0705  Gross per 24 hour   Intake 1583.33 ml   Output    Net 1583.33 ml      General: Comfortable, no distress    HEENT: Atraumatic skull, pupils equal  Lungs:  No abnormal audible breath sounds. Speaking in complete sentences  Heart:  Tachy, RR  Abdomen: Soft, Nondistended, nontender. Neurologic:  A&O  Psych:   Good insight. Not anxious nor agitated    Laboratory:    Recent Results (from the past 24 hour(s))   URINALYSIS W/MICROSCOPIC    Collection Time: 05/21/22  5:58 PM   Result Value Ref Range    Color YELLOW      Appearance CLOUDY (A) CLEAR      Specific gravity 1.024 1.003 - 1.030      pH (UA) 5.0 5.0 - 8.0      Protein 30 (A) NEG mg/dL    Glucose Negative NEG mg/dL    Ketone TRACE (A) NEG mg/dL    Bilirubin Negative NEG      Blood Negative NEG      Urobilinogen 0.2 0.2 - 1.0 EU/dL    Nitrites Negative NEG      Leukocyte Esterase SMALL (A) NEG      WBC 20-50 0 - 4 /hpf    RBC 5-10 0 - 5 /hpf    Epithelial cells MANY (A) FEW /lpf    Bacteria 1+ (A) NEG /hpf    Mucus 3+ (A) NEG /lpf    CA Oxalate crystals 2+ (A) NEG    Hyaline cast >20 (H) 0 - 5 /lpf    Granular cast 2-5 (A) NEG /lpf   URINE CULTURE HOLD SAMPLE    Collection Time: 05/21/22  5:58 PM    Specimen: Serum; Urine   Result Value Ref Range    Urine culture hold        Urine on hold in Microbiology dept for 2 days. If unpreserved urine is submitted, it cannot be used for addtional testing after 24 hours, recollection will be required.    PROCALCITONIN    Collection Time: 05/22/22  2:37 AM   Result Value Ref Range    Procalcitonin <0.05 ng/mL   C REACTIVE PROTEIN, QT    Collection Time: 05/22/22 2: 37 AM   Result Value Ref Range    C-Reactive protein 7.67 (H) 0.00 - 5.29 mg/dL   METABOLIC PANEL, COMPREHENSIVE    Collection Time: 05/22/22  2:37 AM   Result Value Ref Range    Sodium 143 136 - 145 mmol/L    Potassium 3.2 (L) 3.5 - 5.1 mmol/L    Chloride 112 (H) 97 - 108 mmol/L    CO2 22 21 - 32 mmol/L    Anion gap 9 5 - 15 mmol/L    Glucose 102 (H) 65 - 100 mg/dL    BUN 17 6 - 20 MG/DL    Creatinine 0.81 0.55 - 1.02 MG/DL    BUN/Creatinine ratio 21 (H) 12 - 20      GFR est AA >60 >60 ml/min/1.73m2    GFR est non-AA >60 >60 ml/min/1.73m2    Calcium 8.0 (L) 8.5 - 10.1 MG/DL    Bilirubin, total 0.3 0.2 - 1.0 MG/DL    ALT (SGPT) 11 (L) 12 - 78 U/L    AST (SGOT) 10 (L) 15 - 37 U/L    Alk. phosphatase 52 45 - 117 U/L    Protein, total 5.6 (L) 6.4 - 8.2 g/dL    Albumin 2.1 (L) 3.5 - 5.0 g/dL    Globulin 3.5 2.0 - 4.0 g/dL    A-G Ratio 0.6 (L) 1.1 - 2.2     MAGNESIUM    Collection Time: 05/22/22  2:37 AM   Result Value Ref Range    Magnesium 1.4 (L) 1.6 - 2.4 mg/dL   CBC W/O DIFF    Collection Time: 05/22/22  2:37 AM   Result Value Ref Range    WBC 9.7 3.6 - 11.0 K/uL    RBC 4.14 3.80 - 5.20 M/uL    HGB 12.8 11.5 - 16.0 g/dL    HCT 38.6 35.0 - 47.0 %    MCV 93.2 80.0 - 99.0 FL    MCH 30.9 26.0 - 34.0 PG    MCHC 33.2 30.0 - 36.5 g/dL    RDW 13.6 11.5 - 14.5 %    PLATELET 965 533 - 292 K/uL    MPV 11.7 8.9 - 12.9 FL    NRBC 0.0 0  WBC    ABSOLUTE NRBC 0.00 0.00 - 0.01 K/uL         Assessment/Plan:     Principal Problem:    Clostridium difficile diarrhea (5/22/2022)    Active Problems:    Nephrolithiasis (5/26/2021)      Hydronephrosis (5/26/2021)      Flank pain (5/26/2021)      Renal insufficiency (5/26/2021)      Bandemia (5/21/2022)      Hypokalemia (5/21/2022)      Dehydration (5/21/2022)      Colitis (5/21/2022)         See above narrative for full detail.

## 2022-05-22 NOTE — CONSULTS
Urology Consult    Subjective:     Date of Consultation:  May 22, 2022    Referring Physician: Molina Cervantes    Reason for Consultation:  Left UPJ stone    History of Present Illness:   Patient is a 75 y.o.  female who is being seen for left upj stone. She was admitted to the hospital for Colitis [K52.9]. Patient developed intense pain several weeks ago. Now pain is manageable however now with diarrhea. Previous stone procedures. CT showing upj stone. Currently being treated for C.diff. Past Medical History:   Diagnosis Date    Hx of cervical cancer     Hx of Lyme disease     Hx of skin cancer, basal cell       Past Surgical History:   Procedure Laterality Date    HX GI      isaias colectomy    HX GYN      partial hysterectomy    HX GYN      c section    HX ORTHOPAEDIC      RIGHT shoulder surgery    HX ORTHOPAEDIC      RIGHT foot surgery    NEUROLOGICAL PROCEDURE UNLISTED      lumber surgery      No family history on file. Social History     Tobacco Use    Smoking status: Current Every Day Smoker     Packs/day: 1.00    Smokeless tobacco: Never Used   Substance Use Topics    Alcohol use: Yes     Alcohol/week: 10.0 standard drinks     Types: 10 Shots of liquor per week     Allergies   Allergen Reactions    Darvocet A500 [Propoxyphene N-Acetaminophen] Shortness of Breath    Iodine Hives    Prednisone Nausea and Vomiting     \"All steroid cause upset stomach\"    Seafood Hives    Sulfa (Sulfonamide Antibiotics) Nausea and Vomiting      Prior to Admission medications    Medication Sig Start Date End Date Taking? Authorizing Provider   cholecalciferol (Vitamin D3) (1000 Units /25 mcg) tablet Take 1,000 Units by mouth daily. Provider, Historical   aspirin 81 mg chewable tablet Take 1 Tab by mouth daily. 10/19/17   Sameer Still MD         Review of Systems:  A comprehensive review of systems was negative except for that written in the HPI.     Objective:     Patient Vitals for the past 8 hrs:   BP Temp Pulse Resp SpO2   22 0704 104/72 98.7 °F (37.1 °C) (!) 104 18 94 %   22 0545 103/66 98.5 °F (36.9 °C) (!) 114 18    22 0409 103/66 98.5 °F (36.9 °C) (!) 114 18 93 %     Temp (24hrs), Av.5 °F (36.9 °C), Min:98 °F (36.7 °C), Max:98.7 °F (37.1 °C)      Intake and Output:    1901 -  0700  In: 1100 [I.V.:1100]  Out: -     Physical Exam:            General:    alert, cooperative, no distress, appears stated age                     Skin:  Normal.                HEENT:  Normal        Throat/Neck:  normal   Lymph nodes:                   Lungs:  nml effort      Cardiovascular:  nml cap refill             Abdomen[de-identified]  soft,mild tender             Genitalia:  exam deferred          Extremities:  negative       Assessment:     Principal Problem:    Clostridium difficile diarrhea (2022)    Active Problems:    Nephrolithiasis (2021)      Hydronephrosis (2021)      Flank pain (2021)      Renal insufficiency (2021)      Bandemia (2022)      Hypokalemia (2022)      Dehydration (2022)      Colitis (2022)          Patient is a 75 y.o.  female who is being seen for left upj stone. She was admitted to the hospital for Colitis [K52.9]. Patient developed intense pain several weeks ago. Now pain is manageable however now with diarrhea. Previous stone procedures. CT showing upj stone. Currently being treated for C.diff. Plan:     - NPO at midnight.     - Will attempt to arrange left ureteroscopy vs stent placement

## 2022-05-23 LAB
ALBUMIN SERPL-MCNC: 2.1 G/DL (ref 3.5–5)
ALBUMIN/GLOB SERPL: 0.6 {RATIO} (ref 1.1–2.2)
ALP SERPL-CCNC: 56 U/L (ref 45–117)
ALT SERPL-CCNC: 10 U/L (ref 12–78)
ANION GAP SERPL CALC-SCNC: 6 MMOL/L (ref 5–15)
AST SERPL-CCNC: 11 U/L (ref 15–37)
BACTERIA SPEC CULT: NORMAL
BILIRUB SERPL-MCNC: 0.4 MG/DL (ref 0.2–1)
BUN SERPL-MCNC: 7 MG/DL (ref 6–20)
BUN/CREAT SERPL: 8 (ref 12–20)
CALCIUM SERPL-MCNC: 8.2 MG/DL (ref 8.5–10.1)
CHLORIDE SERPL-SCNC: 114 MMOL/L (ref 97–108)
CO2 SERPL-SCNC: 24 MMOL/L (ref 21–32)
CREAT SERPL-MCNC: 0.83 MG/DL (ref 0.55–1.02)
ERYTHROCYTE [DISTWIDTH] IN BLOOD BY AUTOMATED COUNT: 13.5 % (ref 11.5–14.5)
GLOBULIN SER CALC-MCNC: 3.4 G/DL (ref 2–4)
GLUCOSE SERPL-MCNC: 103 MG/DL (ref 65–100)
HCT VFR BLD AUTO: 40.8 % (ref 35–47)
HGB BLD-MCNC: 13.2 G/DL (ref 11.5–16)
MAGNESIUM SERPL-MCNC: 1.4 MG/DL (ref 1.6–2.4)
MCH RBC QN AUTO: 30.6 PG (ref 26–34)
MCHC RBC AUTO-ENTMCNC: 32.4 G/DL (ref 30–36.5)
MCV RBC AUTO: 94.7 FL (ref 80–99)
NRBC # BLD: 0 K/UL (ref 0–0.01)
NRBC BLD-RTO: 0 PER 100 WBC
PHOSPHATE SERPL-MCNC: 2 MG/DL (ref 2.6–4.7)
PLATELET # BLD AUTO: 250 K/UL (ref 150–400)
PMV BLD AUTO: 11.6 FL (ref 8.9–12.9)
POTASSIUM SERPL-SCNC: 3.6 MMOL/L (ref 3.5–5.1)
PROT SERPL-MCNC: 5.5 G/DL (ref 6.4–8.2)
RBC # BLD AUTO: 4.31 M/UL (ref 3.8–5.2)
SERVICE CMNT-IMP: NORMAL
SODIUM SERPL-SCNC: 144 MMOL/L (ref 136–145)
WBC # BLD AUTO: 9.6 K/UL (ref 3.6–11)

## 2022-05-23 PROCEDURE — 84100 ASSAY OF PHOSPHORUS: CPT

## 2022-05-23 PROCEDURE — 65270000029 HC RM PRIVATE

## 2022-05-23 PROCEDURE — 83735 ASSAY OF MAGNESIUM: CPT

## 2022-05-23 PROCEDURE — 80053 COMPREHEN METABOLIC PANEL: CPT

## 2022-05-23 PROCEDURE — 74011250637 HC RX REV CODE- 250/637: Performed by: NURSE PRACTITIONER

## 2022-05-23 PROCEDURE — 36415 COLL VENOUS BLD VENIPUNCTURE: CPT

## 2022-05-23 PROCEDURE — 74011250636 HC RX REV CODE- 250/636: Performed by: INTERNAL MEDICINE

## 2022-05-23 PROCEDURE — 97161 PT EVAL LOW COMPLEX 20 MIN: CPT

## 2022-05-23 PROCEDURE — 2709999900 HC NON-CHARGEABLE SUPPLY

## 2022-05-23 PROCEDURE — 97116 GAIT TRAINING THERAPY: CPT

## 2022-05-23 PROCEDURE — 97535 SELF CARE MNGMENT TRAINING: CPT

## 2022-05-23 PROCEDURE — 77030040922 HC BLNKT HYPOTHRM STRY -A

## 2022-05-23 PROCEDURE — 85027 COMPLETE CBC AUTOMATED: CPT

## 2022-05-23 PROCEDURE — 74011250637 HC RX REV CODE- 250/637: Performed by: INTERNAL MEDICINE

## 2022-05-23 PROCEDURE — 97165 OT EVAL LOW COMPLEX 30 MIN: CPT

## 2022-05-23 PROCEDURE — 77030040361 HC SLV COMPR DVT MDII -B

## 2022-05-23 RX ORDER — VANCOMYCIN HYDROCHLORIDE 250 MG/5ML
500 POWDER, FOR SOLUTION ORAL EVERY 6 HOURS
Status: DISCONTINUED | OUTPATIENT
Start: 2022-05-23 | End: 2022-05-26

## 2022-05-23 RX ADMIN — VANCOMYCIN HYDROCHLORIDE 500 MG: 250 POWDER, FOR SOLUTION ORAL at 19:39

## 2022-05-23 RX ADMIN — VANCOMYCIN HYDROCHLORIDE 125 MG: 250 POWDER, FOR SOLUTION ORAL at 01:19

## 2022-05-23 RX ADMIN — VANCOMYCIN HYDROCHLORIDE 125 MG: 250 POWDER, FOR SOLUTION ORAL at 13:31

## 2022-05-23 RX ADMIN — POTASSIUM CHLORIDE, DEXTROSE MONOHYDRATE AND SODIUM CHLORIDE: 300; 5; 450 INJECTION, SOLUTION INTRAVENOUS at 22:03

## 2022-05-23 RX ADMIN — MORPHINE SULFATE 2 MG: 2 INJECTION, SOLUTION INTRAMUSCULAR; INTRAVENOUS at 20:56

## 2022-05-23 RX ADMIN — MORPHINE SULFATE 2 MG: 2 INJECTION, SOLUTION INTRAMUSCULAR; INTRAVENOUS at 01:19

## 2022-05-23 RX ADMIN — MORPHINE SULFATE 2 MG: 2 INJECTION, SOLUTION INTRAMUSCULAR; INTRAVENOUS at 11:50

## 2022-05-23 RX ADMIN — ONDANSETRON 4 MG: 2 INJECTION INTRAMUSCULAR; INTRAVENOUS at 06:44

## 2022-05-23 RX ADMIN — MORPHINE SULFATE 2 MG: 2 INJECTION, SOLUTION INTRAMUSCULAR; INTRAVENOUS at 05:52

## 2022-05-23 RX ADMIN — ONDANSETRON 4 MG: 2 INJECTION INTRAMUSCULAR; INTRAVENOUS at 20:56

## 2022-05-23 RX ADMIN — POTASSIUM CHLORIDE, DEXTROSE MONOHYDRATE AND SODIUM CHLORIDE: 300; 5; 450 INJECTION, SOLUTION INTRAVENOUS at 01:24

## 2022-05-23 RX ADMIN — PROCHLORPERAZINE EDISYLATE 5 MG: 5 INJECTION INTRAMUSCULAR; INTRAVENOUS at 08:50

## 2022-05-23 RX ADMIN — VANCOMYCIN HYDROCHLORIDE 125 MG: 250 POWDER, FOR SOLUTION ORAL at 06:41

## 2022-05-23 RX ADMIN — MORPHINE SULFATE 2 MG: 2 INJECTION, SOLUTION INTRAMUSCULAR; INTRAVENOUS at 16:52

## 2022-05-23 RX ADMIN — Medication 1 CAPSULE: at 11:55

## 2022-05-23 RX ADMIN — MORPHINE SULFATE 2 MG: 2 INJECTION, SOLUTION INTRAMUSCULAR; INTRAVENOUS at 08:48

## 2022-05-23 NOTE — PROGRESS NOTES
Urology Progress Note    Patient: Kaylan Sarmiento MRN: [id number]  SSN: [id number]    YOB: 1955  Age: 75 y.o.   Sex: female        Assessment:     Resting in bed, reporting RLQ abdominal pain and left flank pain persistent despite IV pain medications    AF, tachycardic  No leukocytosis  Cr 0.81 (1.07)   UCX negative   + CDiff    Plan:     10 mm left UPJ calculus with associated left hydronephrosis  -OK to eat today, NPO after midnight for cystoscopy, bilateral RPG, left ureteral stent placement tomorrow at 1300 with Dr Santiago Grimes  -Will need OP f/u to discuss definitive stone treatment     Reason For Visit:     Follow up for 10 mm left UPJ calculus with associated left hydronephrosis    ROS:     Denies fevers  Reports right sided abdominal pain, reports left flank pain   Denies hematuria, frequency    Objective:     Visit Vitals  BP (!) 91/59   Pulse 98   Temp 98.8 °F (37.1 °C)   Resp 18   Ht 5' 1\" (1.549 m)   Wt 60.4 kg (133 lb 2.5 oz)   SpO2 95%   BMI 25.16 kg/m²         Intake/Output Summary (Last 24 hours) at 5/23/2022 0839  Last data filed at 5/23/2022 0440  Gross per 24 hour   Intake 2158.33 ml   Output    Net 2158.33 ml       Physical Exam  General: NAD  Skin: warm, dry, intact   Respiratory: no distress, room air  Abdomen: soft, no distention, RLQ tenderness   : left CVA tenderness, voiding independently   Neuro: Appropriate, no focal neurological deficits    Labs reviewed, May 23, 2022  Recent Results (from the past 24 hour(s))   CBC W/O DIFF    Collection Time: 05/23/22  5:53 AM   Result Value Ref Range    WBC 9.6 3.6 - 11.0 K/uL    RBC 4.31 3.80 - 5.20 M/uL    HGB 13.2 11.5 - 16.0 g/dL    HCT 40.8 35.0 - 47.0 %    MCV 94.7 80.0 - 99.0 FL    MCH 30.6 26.0 - 34.0 PG    MCHC 32.4 30.0 - 36.5 g/dL    RDW 13.5 11.5 - 14.5 %    PLATELET 737 255 - 384 K/uL    MPV 11.6 8.9 - 12.9 FL    NRBC 0.0 0  WBC    ABSOLUTE NRBC 0.00 0.00 - 0.01 K/uL Imaging:  CT Results  (Last 48 hours)               05/21/22 1243  CT ABD PELV WO CONT Final result    Impression:  Left hydronephrosis and hydroureter secondary to a proximal ureteral 10 mm   calculus. Nonobstructing left renal calculus. Colitis of the left transverse colon as well as the rectum and lower sigmoid       Narrative:  EXAM: CT ABD PELV WO CONT       INDICATION: persistent pain, diarrhea       COMPARISON: 4/24/2022       IV CONTRAST: None. ORAL CONTRAST: None       TECHNIQUE:    Thin axial images were obtained through the abdomen and pelvis. Coronal and   sagittal reformats were generated. CT dose reduction was achieved through use of   a standardized protocol tailored for this examination and automatic exposure   control for dose modulation. The absence of intravenous contrast material reduces the sensitivity for   evaluation of the vasculature and solid organs. FINDINGS:    LOWER THORAX: No significant abnormality in the incidentally imaged lower chest.   LIVER: No mass. BILIARY TREE: Gallbladder is within normal limits. CBD is not dilated. SPLEEN: within normal limits. PANCREAS: No focal abnormality. ADRENALS: A plastic adrenal   KIDNEYS/URETERS: Left hydronephrosis secondary to a left proximal ureteral 10 mm   calculus. 2 mm left upper renal pole nonobstructing calculus. STOMACH: Unremarkable. SMALL BOWEL: No dilatation or wall thickening. COLON: Diffuse thickening of the rectum and lower sigmoid. Sigmoid anastomosis   on axial image 39. Diffuse thickening of a 8 cm segment of the left transverse   colon (image 30). APPENDIX: Not visualized   PERITONEUM: No ascites or pneumoperitoneum. RETROPERITONEUM: No lymphadenopathy or aortic aneurysm. REPRODUCTIVE ORGANS: Prior hysterectomy    URINARY BLADDER: No mass or calculus. BONES: No destructive bone lesion. Disc desiccation at the lumbosacral junction. ABDOMINAL WALL: No mass or hernia.    ADDITIONAL COMMENTS: N/A                 Signed By: Yobany Bailey NP - May 23, 2022

## 2022-05-23 NOTE — PROGRESS NOTES
Bedside and Verbal shift change report given to [address] RN (oncoming nurse) by Quinn Diaz RN (offgoing nurse). Report included the following information SBAR, Kardex, OR Summary, Procedure Summary, Intake/Output, MAR, Accordion and Recent Results.

## 2022-05-23 NOTE — PROGRESS NOTES
[facility]    Medical Progress Note      NAME: Valerie Winston   :  1955  MRM:  [id number]    Date/Time of service 2022  10:28 AM          Assessment and Plan:     Clostridium difficile diarrhea / Colitis / Ulcerative proctitis / BRBPR - POA, transverse colitis suggested by CT and sepsis. Continue PO vanco.  Undetectable procalcitonin. C diff DNA on stool studies. No anemia, despite report of chronic persistent BRBPR. Consulted GI, and they have discussed fecal transplant plan with patient      Sepsis / Delwyn Juan / Sinus tachycardia - POA, presumed due to C diff, vs pyelo, vs other. Follow blood Cx. UA bland so far. Abx as above.     Renal insufficiency / Hypokalemia / Dehydration - POA due to poor PO intake. Hydrated, replete K and follow. Better.     Nephrolithiasis / Hydronephrosis / Flank pain - POA, 1.0 cm calculous L UPJ with hydro. UA unremarkable. Urology consulted and are planning stenting procedure. Subjective:     Chief Complaint:  abd pain and nausea persist    ROS:  (bold if positive, if negative)    Abd Pain  SOB/VAZQUEZ   Nausea/VomitTolerating some PT  Tolerating trace clear Diet        Objective:     Last 24hrs VS reviewed since prior progress note.  Most recent are:    Visit Vitals  BP (!) 91/59   Pulse 98   Temp 98.8 °F (37.1 °C)   Resp 18   Ht 5' 1\" (1.549 m)   Wt 60.4 kg (133 lb 2.5 oz)   SpO2 95%   BMI 25.16 kg/m²     SpO2 Readings from Last 6 Encounters:   22 95%   22 95%   21 95%   10/18/17 100%            Intake/Output Summary (Last 24 hours) at 2022 3386  Last data filed at 2022 0440  Gross per 24 hour   Intake 2158.33 ml   Output    Net 2158.33 ml        Physical Exam:    Gen:  Frail, in no acute distress  HEENT:  Pink conjunctivae, PERRL, hearing intact to voice, moist mucous membranes  Neck:  Supple, without masses, thyroid non-tender  Resp:  No accessory muscle use, clear breath sounds without wheezes rales or rhonchi  Card:  No murmurs, normal S1, S2 without thrills, bruits or peripheral edema  Abd:  Soft, tender, non-distended, reduced bowel sounds are present, no mass  Lymph:  No cervical or inguinal adenopathy  Musc:  No cyanosis or clubbing  Skin:  No rashes or ulcers, skin turgor is good  Neuro:  Cranial nerves are grossly intact, mild motor weakness, follows commands   Psych:  Poor insight, oriented to person, place and time, anxious    Telemetry reviewed:   normal sinus rhythm  __________________________________________________________________  Medications Reviewed: (see below)  Medications:     Current Facility-Administered Medications   Medication Dose Route Frequency    vancomycin (FIRVANQ) 50 mg/mL oral solution 125 mg  125 mg Oral Q6H    L.acidophilus-paracasei-S.thermophil-bifidobacter (RISAQUAD) 8 billion cell capsule  1 Capsule Oral DAILY    prochlorperazine (COMPAZINE) injection 5 mg  5 mg IntraVENous Q6H PRN    morphine injection 2 mg  2 mg IntraVENous Q3H PRN    hydrocortisone (ANUSOL-HC) suppository 25 mg  25 mg Rectal Q12H    dextrose 5% - 0.45% NaCl with KCl 40 mEq/L infusion   IntraVENous CONTINUOUS    acetaminophen (TYLENOL) tablet 650 mg  650 mg Oral Q6H PRN    polyethylene glycol (MIRALAX) packet 17 g  17 g Oral DAILY PRN    ondansetron (ZOFRAN) injection 4 mg  4 mg IntraVENous Q6H PRN    enoxaparin (LOVENOX) injection 40 mg  40 mg SubCUTAneous DAILY        Lab Data Reviewed: (see below)  Lab Review:     Recent Labs     05/23/22  0553 05/22/22  0237 05/21/22  1223   WBC 9.6 9.7 10.7   HGB 13.2 12.8 15.0   HCT 40.8 38.6 45.8    211 292     Recent Labs     05/22/22  0237 05/21/22  1223    144   K 3.2* 3.0*   * 108   CO2 22 26   * 93   BUN 17 27*   CREA 0.81 1.07*   CA 8.0* 9.1   MG 1.4* 1.8   ALB 2.1* 2.8*   TBILI 0.3 0.5   ALT 11* 13     Lab Results   Component Value Date/Time    Glucose (POC) 93 10/16/2017 09:19 AM    Glucose (POC) 116 (H) 10/15/2017 02:12 PM Glucose (POC) 141 (H) 10/14/2017 01:14 PM     No results for input(s): PH, PCO2, PO2, HCO3, FIO2 in the last 72 hours. No results for input(s): INR, INREXT, INREXT in the last 72 hours. All Micro Results     Procedure Component Value Units Date/Time    CULTURE, URINE [[id number]] Collected: 05/21/22 1635    Order Status: Completed Specimen: Urine from Clean catch Updated: 05/22/22 0930    ENTERIC BACTERIA PANEL, DNA [[id number]] Collected: 05/21/22 1344    Order Status: Completed Specimen: Stool Updated: 05/21/22 2153     Shigella species, DNA Negative        Campylobacter species, DNA Negative        Vibrio species, DNA Negative        Enterotoxigen E Coli, DNA Negative        Shiga toxin producing, DNA Negative        Salmonella species, DNA Negative        P. shigelloides, DNA Negative        Y. enterocolitica, DNA Negative       C. DIFFICILE AG & TOXIN A/B [[id number]]  (Abnormal) Collected: 05/21/22 1344    Order Status: Completed Specimen: Stool Updated: 05/21/22 2145     PCR Reflex       See Reflex order for C. difficile (DNA)           INTERPRETATION       Indeterminate for Toxigenic C. difficile, DNA confirmation to follow. C. DIFFICILE (DNA) [[id number]]  (Abnormal) Collected: 05/21/22 1344    Order Status: Completed Specimen: Stool Updated: 05/21/22 2145     C. difficile (DNA) Positive        Comment: This specimen is positive for toxigenic C difficile by DNA amplification. Repeat testing is not recommended, samples received within 7 days of this positive result will be rejected. Assay is not approved to test for cure, since nucleic acids may persist after effective treatment and may prompt false positive results. CALLED TO AND READ BACK BY  [facility] FATIMAH RN AT 2144 ON 5/21/22.  AT         URINE CULTURE HOLD SAMPLE [[id number]] Collected: 05/21/22 2418    Order Status: Completed Specimen: Urine from Serum Updated: 05/21/22 1804     Urine culture hold       Urine on hold in Microbiology dept for 2 days. If unpreserved urine is submitted, it cannot be used for addtional testing after 24 hours, recollection will be required. Other pertinent lab: none    Total time spent with patient: 30 Minutes I personally reviewed chart, notes, data and current medications in the medical record. I have personally examined and treated the patient at bedside during this period.                  Care Plan discussed with: Patient, Care Manager, Nursing Staff, Consultant/Specialist and >50% of time spent in counseling and coordination of care    Discussed:  Care Plan and D/C Planning    Prophylaxis:  H2B/PPI    Disposition:  Home w/Family           ___________________________________________________    Attending Physician: Tre Latham MD

## 2022-05-23 NOTE — PROGRESS NOTES
Progress Note  Date:2022       Room:[facility]  Patient Name:Betsy Staton     YOB: 1955     Age:67 y.o. Subjective    Subjective:  Symptoms:  She reports diarrhea. Diet:  Adequate intake. No nausea or vomiting. Pain:  She complains of pain that is mild. Review of Systems   Constitutional: Positive for fatigue and fever. Negative for appetite change. Gastrointestinal: Positive for abdominal pain and diarrhea. Negative for blood in stool, nausea and vomiting. Skin: Negative for pallor. Objective         Vitals Last 24 Hours:  TEMPERATURE:  Temp  Av.6 °F (37 °C)  Min: 97.1 °F (36.2 °C)  Max: 100.4 °F (38 °C)  RESPIRATIONS RANGE: Resp  Av.2  Min: 16  Max: 18  PULSE OXIMETRY RANGE: SpO2  Av.8 %  Min: 92 %  Max: 98 %  PULSE RANGE: Pulse  Av  Min: 96  Max: 111  BLOOD PRESSURE RANGE: Systolic (74CWV), LU , Min:91 , OVK:058   ; Diastolic (58JNU), CYH:72, Min:59, Max:74    I/O (24Hr): Intake/Output Summary (Last 24 hours) at 2022 1558  Last data filed at 2022 0440  Gross per 24 hour   Intake 2158.33 ml   Output    Net 2158.33 ml     Objective:  General Appearance:  Comfortable. Vital signs: (most recent): Blood pressure 97/61, pulse 100, temperature 98 °F (36.7 °C), resp. rate 18, height 5' 1\" (1.549 m), weight 60.4 kg (133 lb 2.5 oz), SpO2 98 %. Fever. Output: Producing stool. HEENT: Normal HEENT exam.    Lungs:  Normal effort and normal respiratory rate. Breath sounds clear to auscultation. Heart: Normal rate. Regular rhythm. S1 normal and S2 normal.  No murmur. Abdomen: Abdomen is soft and non-distended. Bowel sounds are normal.   There is generalized tenderness. There is right lower quadrant tenderness. Extremities: Normal range of motion. There is no dependent edema. Pulses: Distal pulses are intact. Neurological: Patient is alert and oriented to person, place and time. Pupils:  Pupils are equal, round, and reactive to light. Skin:  Warm and dry. Labs/Imaging/Diagnostics    Labs:  CBC:  Recent Labs     05/23/22  0553 05/22/22  0237 05/21/22  1223   WBC 9.6 9.7 10.7   RBC 4.31 4.14 4.84   HGB 13.2 12.8 15.0   HCT 40.8 38.6 45.8   MCV 94.7 93.2 94.6   RDW 13.5 13.6 13.5    211 292     CHEMISTRIES:  Recent Labs     05/23/22  1012 05/22/22  0237 05/21/22  1223    143 144   K 3.6 3.2* 3.0*   * 112* 108   CO2 24 22 26   BUN 7 17 27*   CA 8.2* 8.0* 9.1   PHOS 2.0*  --   --    MG 1.4* 1.4* 1.8   PT/INR:No results for input(s): INR, INREXT in the last 72 hours. No lab exists for component: PROTIME  APTT:No results for input(s): APTT in the last 72 hours. LIVER PROFILE:  Recent Labs     05/23/22  1012 05/22/22  0237 05/21/22  1223   AST 11* 10* 11*   ALT 10* 11* 13     Lab Results   Component Value Date/Time    ALT (SGPT) 10 (L) 05/23/2022 10:12 AM    AST (SGOT) 11 (L) 05/23/2022 10:12 AM    Alk. phosphatase 56 05/23/2022 10:12 AM    Bilirubin, total 0.4 05/23/2022 10:12 AM       Imaging Last 24 Hours:  No results found. Assessment//Plan   Principal Problem:    Clostridium difficile diarrhea (5/22/2022)    Active Problems:    Nephrolithiasis (5/26/2021)      Hydronephrosis (5/26/2021)      Flank pain (5/26/2021)      Renal insufficiency (5/26/2021)      Bandemia (5/21/2022)      Hypokalemia (5/21/2022)      Dehydration (5/21/2022)      Colitis (5/21/2022)      Assessment:  (56-year-old female who in March was diagnosed with ulcerative proctitis and follows with my partner Dr. Kiki Tejada.  She was initially placed on prednisone taper and mesalamine suppositories. More recently she notes worsening GI symptoms which include abdominal pain, nausea, and diarrhea. She tells me she has had 15 bowel movements already today, most of which are watery. Blood noted as well. She is not anemic though inflammatory markers are elevated. Stool positive for C.  Difficile, she has been started on oral vancomycin and IV Flagyl. CT scan shows diffuse thickening up to the rectosigmoid junction as well as some thickening within the transverse colon. Last colonoscopy 3/2022 by Dr. Riddhi Koch with ulcerative proctitis. 5/23/2022: Reports ongoing diarrhea, unchanged by vancomycin. No rectal bleeding. Mild lower abdominal pain that is worse in the right lower quadrant on exam.  No nausea or vomiting. Continues to run a fever, T-max 100.4.). Plan:   ( We will increase vancomycin dose to 500 mg and follow for response. Dr. Nita Tompkins discussed possible fecal transplant which may be able to be done on Friday per her report of her conversation with him. Hopefully she will have a good response to vancomycin and we can move forward with outpatient follow-up.). Electronically signed by Constantine Spaulding NP on 5/23/2022 at 3:58 PM    She is reporting bowel movements every 15 to 30 minutes, nausea, fortunately absence of visible blood loss. Abdominal examination is remarkable for lower abdominal tenderness; there is no abdominal distention. I have advised her I will increase her dose of vancomycin; she understands there is a possibility of medication failure. Consequently consider  Discussed colonoscopy for the purposes of fecal transplant; she understands alternatives complications; all questions answered.     I have interviewed and examined patient with addendum to note above and formulation care plan to reflect my evaluation    Patricia Cullen M.D.

## 2022-05-23 NOTE — PROGRESS NOTES
OCCUPATIONAL THERAPY EVALUATION/DISCHARGE  Patient: Senia Weinberg (12 y.o. female)  Date: 5/23/2022  Primary Diagnosis: Colitis [K52.9]  Procedure(s) (LRB):  CYSTOSCOPY WITH BILATERAL RETROGRADES LEFT STENT INSERTION (Bilateral)  . (Bilateral)     Precautions:   Contact (CDiff)    ASSESSMENT  Based on the objective data described below, the patient presents with hospital admission secondary to colitis with C-Diff. Patient pleasant and cooperative and able to demonstrate ADL tasks and transfers with no assistance. Patient managing IV pole and ambulated to bathroom while therapist present in room. Patient does not require further acute care OT services. Functional Outcome Measure: The patient scored 100/100 on the Barthel Index outcome measure. Other factors to consider for discharge:      PLAN :  Recommend with staff:     Recommendation for discharge: (in order for the patient to meet his/her long term goals)  No skilled occupational therapy/ follow up rehabilitation needs identified at this time. This discharge recommendation:  Has been made in collaboration with the attending provider and/or case management    IF patient discharges home will need the following DME: none       SUBJECTIVE:   Patient stated Sandy Joya been dealing with all this for so long.     OBJECTIVE DATA SUMMARY:   HISTORY:   Past Medical History:   Diagnosis Date    Hx of cervical cancer     Hx of Lyme disease     Hx of skin cancer, basal cell      Past Surgical History:   Procedure Laterality Date    HX GI      isaias colectomy    HX GYN      partial hysterectomy    HX GYN      c section    HX ORTHOPAEDIC      RIGHT shoulder surgery    HX ORTHOPAEDIC      RIGHT foot surgery    NEUROLOGICAL PROCEDURE UNLISTED      lumber surgery       Prior Level of Function/Environment/Context: independent  Expanded or extensive additional review of patient history:   Home Situation  Home Environment: Private residence  # Steps to Enter: 4  Rails to Enter: Yes  One/Two Story Residence: One story  Living Alone: No  Support Systems: Child(randolph),Friend/Neighbor  Patient Expects to be Discharged to[de-identified] Home  Current DME Used/Available at Home: None  Tub or Shower Type: Tub/Shower combination    Hand dominance: Right    EXAMINATION OF PERFORMANCE DEFICITS:  Cognitive/Behavioral Status:  Neurologic State: Alert  Orientation Level: Oriented X4  Cognition: Appropriate decision making; Follows commands; Appropriate safety awareness  Perception: Appears intact  Perseveration: No perseveration noted  Safety/Judgement: Awareness of environment    Skin: intact as seen    Edema: none noted     Hearing:   Auditory  Auditory Impairment: None    Vision/Perceptual:                                     Range of Motion:  AROM: Within functional limits                         Strength:  Strength: Generally decreased, functional                Coordination:  Coordination: Within functional limits            Tone & Sensation:    Tone: Normal  Sensation: Intact                      Balance:  Sitting: Intact  Standing: Intact    Functional Mobility and Transfers for ADLs:  Bed Mobility:  Rolling: Independent  Supine to Sit: Independent  Scooting: Independent    Transfers:  Sit to Stand: Independent  Stand to Sit: Independent  Bed to Chair: Independent  Toilet Transfer : Independent    ADL Assessment:  Feeding: Independent    Oral Facial Hygiene/Grooming: Independent    Bathing: Independent    Upper Body Dressing: Independent    Lower Body Dressing: Independent    Toileting: Independent                ADL Intervention and task modifications:                                     Cognitive Retraining  Safety/Judgement: Awareness of environment    Therapeutic Exercise:     Functional Measure:    Barthel Index:  Bathin  Bladder: 10  Bowels: 10  Groomin  Dressing: 10  Feeding: 10  Mobility: 15  Stairs: 10  Toilet Use: 10  Transfer (Bed to Chair and Back): 15  Total: 100/100 The Barthel ADL Index: Guidelines  1. The index should be used as a record of what a patient does, not as a record of what a patient could do. 2. The main aim is to establish degree of independence from any help, physical or verbal, however minor and for whatever reason. 3. The need for supervision renders the patient not independent. 4. A patient's performance should be established using the best available evidence. Asking the patient, friends/relatives and nurses are the usual sources, but direct observation and common sense are also important. However direct testing is not needed. 5. Usually the patient's performance over the preceding 24-48 hours is important, but occasionally longer periods will be relevant. 6. Middle categories imply that the patient supplies over 50 per cent of the effort. 7. Use of aids to be independent is allowed. Score Interpretation (from [address] [address])    Independent   60-79 Minimally independent   40-59 Partially dependent   20-39 Very dependent   <20 Totally dependent     -Reinier Peñaloza., Barthel, D.W. (1965). Functional evaluation: the Barthel Index. [facility] ([address] (1997). The Barthel activities of daily living index: self-reporting versus actual performance in the old (> or = 75 years). Journal of [address] [place] [address] 457), [facility], J.JArnaldoArnaldoF, [place]., Jayleen Holden. (1999). Measuring the change in disability after inpatient rehabilitation; comparison of the responsiveness of the Barthel Index and Functional [place] Measure. Journal of Neurology, Neurosurgery, and Psychiatry, 66(4), 902-794. Sherin Echeverria, N.J.A, KYAW Ferreira, & Hitesh Arriaza MERICA. (2004) Assessment of post-stroke quality of life in cost-effectiveness studies: The usefulness of the Barthel Index and the EuroQoL-5D.  Quality of Life Research, 15, 912-09         Occupational Therapy Evaluation Charge Determination History Examination Decision-Making   LOW Complexity : Brief history review  LOW Complexity : 1-3 performance deficits relating to physical, cognitive , or psychosocial skils that result in activity limitations and / or participation restrictions  LOW Complexity : No comorbidities that affect functional and no verbal or physical assistance needed to complete eval tasks       Based on the above components, the patient evaluation is determined to be of the following complexity level: LOW   Pain Rating:  None reported     Activity Tolerance:   Good    After treatment patient left in no apparent distress:    Sitting in chair and Call bell within reach    COMMUNICATION/EDUCATION:   The patients plan of care was discussed with: Physical therapist and Registered nurse.      Thank you for this referral.  Mahogany Conley OTR/L  Time Calculation: 15 mins

## 2022-05-23 NOTE — PROGRESS NOTES
Problem: Risk for Spread of Infection  Goal: Prevent transmission of infectious organism to others  Description: Prevent the transmission of infectious organisms to other patients, staff members, and visitors. Outcome: Progressing Towards Goal     Problem: Falls - Risk of  Goal: *Absence of Falls  Description: Document Justin Felix Fall Risk and appropriate interventions in the flowsheet.   Outcome: Progressing Towards Goal  Note: Fall Risk Interventions:            Medication Interventions: Patient to call before getting OOB

## 2022-05-23 NOTE — PROGRESS NOTES
5/23/2022  10:09 AM    Reason for Admission:  Abdominal pain; CDiff+  Assessment completed in person with patient     PMH: Lyme disease; cervical cancer; skin cancer    At baseline, patient states she is independent with ADLs. She lives with a friend, Clemencia Duarte, who assists with meal preparation and transportation. The address is: [address] [place] [address], [address] [place] [address]  4 steps to enter, one-level. DME: None  HH/rehab: No Hx    Preferred Rx: [facility] [address]                   RUR Score:      9%, low               Plan for utilizing home health:      TBD, PT/OT evals pending    PCP: First and Last name:  None     Name of Practice:    Are you a current patient: Yes/No:    Approximate date of last visit:    Can you participate in a virtual visit with your PCP:                     Current Advanced Directive/Advance Care Plan: Full Code      Healthcare Decision Maker:   Tamar Cyndi, [phone], son                  Transition of Care Plan:                    1. CDiff, colitis - IV antbx; GI consult; urology consult - L stone, plan for stent placement tomorrow  2. Home with friend, PT/OT evals pending  3. Outpatient f/u - CM offered to provide list of PCP providers, patient declined stating she will schedule her own appt  4. Friend or son to transport  5. CM to continue to follow    Care Management Interventions  PCP Verified by CM: No (No PCP)  Palliative Care Criteria Met (RRAT>21 & CHF Dx)?: No  Mode of Transport at Discharge:  Other (see comment) (friend or family)  Transition of Care Consult (CM Consult): Discharge Planning  MyChart Signup: No  Discharge Durable Medical Equipment: No  Physical Therapy Consult: Yes  Occupational Therapy Consult: Yes  Speech Therapy Consult: No  Support Systems: Child(randolph),Friend/Neighbor  Confirm Follow Up Transport: Friends  The Plan for Transition of Care is Related to the Following Treatment Goals : abdominal pain  Discharge Location  Patient Expects to be Discharged to[de-identified] Home with family assistance    Rekha Carmona RN

## 2022-05-23 NOTE — PROGRESS NOTES
Problem: Mobility Impaired (Adult and Pediatric)  Goal: *Acute Goals and Plan of Care (Insert Text)  Outcome: Progressing Towards Goal   PHYSICAL THERAPY EVALUATION/DISCHARGE  Patient: Susu Samayoa (7 y.o. female)  Date: 5/23/2022  Primary Diagnosis: Colitis [K52.9]  Procedure(s) (LRB):  CYSTOSCOPY WITH BILATERAL RETROGRADES LEFT STENT INSERTION (Bilateral)  . (Bilateral)     Precautions:   Contact (CDiff)      ASSESSMENT  Based on the objective data described below, the patient presents with admission due to Colitis with CDiff. Pt received supine in bed. Demonstrating independence with bed mobility, donning of shoes on EOB, sit to stand and gait of 25' in room holding IV pole. Pt toileted self independently. Returned to chair in NAD. Further skilled acute physical therapy is not indicated at this time. Functional Measurement: pt scored 100/100 on barthel measure      Other factors to consider for discharge: per above; none          PLAN :  Recommendation for discharge: (in order for the patient to meet his/her long term goals)  No skilled physical therapy/ follow up rehabilitation needs identified at this time. This discharge recommendation:  Has been made in collaboration with the attending provider and/or case management    IF patient discharges home will need the following DME: none       SUBJECTIVE:   Patient stated I hope this is finally cured.     OBJECTIVE DATA SUMMARY:   HISTORY:    Past Medical History:   Diagnosis Date    Hx of cervical cancer     Hx of Lyme disease     Hx of skin cancer, basal cell      Past Surgical History:   Procedure Laterality Date    HX GI      isaias colectomy    HX GYN      partial hysterectomy    HX GYN      c section    HX ORTHOPAEDIC      RIGHT shoulder surgery    HX ORTHOPAEDIC      RIGHT foot surgery    NEUROLOGICAL PROCEDURE UNLISTED      lumber surgery       Prior level of function: independent  Personal factors and/or comorbidities impacting plan of care: none    Home Situation  Home Environment: Private residence  # Steps to Enter: 4  Rails to Enter: Yes  One/Two Story Residence: One story  Living Alone: No  Support Systems: Child(randolph),Friend/Neighbor  Patient Expects to be Discharged to[de-identified] Home  Current DME Used/Available at Home: None  Tub or Shower Type: Tub/Shower combination    EXAMINATION/PRESENTATION/DECISION MAKING:   Critical Behavior:  Neurologic State: Alert  Orientation Level: Oriented X4  Cognition: Appropriate decision making,Follows commands,Appropriate safety awareness     Hearing: Auditory  Auditory Impairment: None  Skin:  IV  Edema: WNL  Range Of Motion:  AROM: Within functional limits                       Strength:    Strength: Generally decreased, functional                    Tone & Sensation:   Tone: Normal              Sensation: Intact               Coordination:  Coordination: Within functional limits  Vision:      Functional Mobility:  Bed Mobility:  Rolling: Independent  Supine to Sit: Independent     Scooting: Independent  Transfers:  Sit to Stand: Independent  Stand to Sit: Independent                       Balance:   Sitting: Intact  Standing: Intact  Ambulation/Gait Training:  Distance (ft): 25 Feet (ft)  Assistive Device:  (IV pole)  Ambulation - Level of Assistance: Independent                                           Functional Measure:  Barthel Index:    Bathin  Bladder: 10  Bowels: 10  Groomin  Dressing: 10  Feeding: 10  Mobility: 15  Stairs: 10  Toilet Use: 10  Transfer (Bed to Chair and Back): 15  Total: 100/100       The Barthel ADL Index: Guidelines  1. The index should be used as a record of what a patient does, not as a record of what a patient could do. 2. The main aim is to establish degree of independence from any help, physical or verbal, however minor and for whatever reason. 3. The need for supervision renders the patient not independent.   4. A patient's performance should be established using the best available evidence. Asking the patient, friends/relatives and nurses are the usual sources, but direct observation and common sense are also important. However direct testing is not needed. 5. Usually the patient's performance over the preceding 24-48 hours is important, but occasionally longer periods will be relevant. 6. Middle categories imply that the patient supplies over 50 per cent of the effort. 7. Use of aids to be independent is allowed. Score Interpretation (from [address] [facility] [address])    Independent   60-79 Minimally independent   40-59 Partially dependent   20-39 Very dependent   <20 Totally dependent     -Reinier Peñaloza., Barthel, D.W. (1965). Functional evaluation: the Barthel Index. [address] ([address] (1997). The Barthel activities of daily living index: self-reporting versus actual performance in the old (> or = 75 years). Journal of [address] [place] [address] 45(7), [facility], ANANYA, Ac Dear., Jose Tubbs. (1999). Measuring the change in disability after inpatient rehabilitation; comparison of the responsiveness of the Barthel Index and Functional [place] Measure. Journal of Neurology, Neurosurgery, and Psychiatry, 66(4), 543-169. Karthik Bob, N.J.A, KYAW Ferreira, & Lily Bailey, M.A. (2004) Assessment of post-stroke quality of life in cost-effectiveness studies: The usefulness of the Barthel Index and the EuroQoL-5D.  Quality of Life Research, 15, 392-73           Physical Therapy Evaluation Charge Determination   History Examination Presentation Decision-Making   MEDIUM  Complexity : 1-2 comorbidities / personal factors will impact the outcome/ POC  LOW Complexity : 1-2 Standardized tests and measures addressing body structure, function, activity limitation and / or participation in recreation  LOW Complexity : Stable, uncomplicated  Other outcome measures barthel  LOW       Based on the above components, the patient evaluation is determined to be of the following complexity level: LOW     Pain Rating:  none    Activity Tolerance:   Good      After treatment patient left in no apparent distress:   Sitting in chair and Call bell within reach    COMMUNICATION/EDUCATION:   The patients plan of care was discussed with: Occupational therapist and Registered nurse. Fall prevention education was provided and the patient/caregiver indicated understanding., Patient/family have participated as able in goal setting and plan of care. , and Patient/family agree to work toward stated goals and plan of care.     Thank you for this referral.  Daisy Carrizales, PT   Time Calculation: 26 mins

## 2022-05-24 ENCOUNTER — ANESTHESIA EVENT (OUTPATIENT)
Dept: SURGERY | Age: 67
DRG: 660 | End: 2022-05-24
Payer: MEDICARE

## 2022-05-24 ENCOUNTER — ANESTHESIA (OUTPATIENT)
Dept: SURGERY | Age: 67
DRG: 660 | End: 2022-05-24
Payer: MEDICARE

## 2022-05-24 ENCOUNTER — APPOINTMENT (OUTPATIENT)
Dept: GENERAL RADIOLOGY | Age: 67
DRG: 660 | End: 2022-05-24
Attending: INTERNAL MEDICINE
Payer: MEDICARE

## 2022-05-24 LAB
ALBUMIN SERPL-MCNC: 2 G/DL (ref 3.5–5)
ALBUMIN/GLOB SERPL: 0.6 {RATIO} (ref 1.1–2.2)
ALP SERPL-CCNC: 55 U/L (ref 45–117)
ALT SERPL-CCNC: 10 U/L (ref 12–78)
ANION GAP SERPL CALC-SCNC: 6 MMOL/L (ref 5–15)
AST SERPL-CCNC: 10 U/L (ref 15–37)
BILIRUB SERPL-MCNC: 0.2 MG/DL (ref 0.2–1)
BUN SERPL-MCNC: 6 MG/DL (ref 6–20)
BUN/CREAT SERPL: 7 (ref 12–20)
CALCIUM SERPL-MCNC: 8.4 MG/DL (ref 8.5–10.1)
CHLORIDE SERPL-SCNC: 114 MMOL/L (ref 97–108)
CO2 SERPL-SCNC: 21 MMOL/L (ref 21–32)
CREAT SERPL-MCNC: 0.86 MG/DL (ref 0.55–1.02)
ERYTHROCYTE [DISTWIDTH] IN BLOOD BY AUTOMATED COUNT: 13.7 % (ref 11.5–14.5)
GLOBULIN SER CALC-MCNC: 3.5 G/DL (ref 2–4)
GLUCOSE SERPL-MCNC: 119 MG/DL (ref 65–100)
HCT VFR BLD AUTO: 38.5 % (ref 35–47)
HGB BLD-MCNC: 12.6 G/DL (ref 11.5–16)
MAGNESIUM SERPL-MCNC: 1.4 MG/DL (ref 1.6–2.4)
MCH RBC QN AUTO: 30.8 PG (ref 26–34)
MCHC RBC AUTO-ENTMCNC: 32.7 G/DL (ref 30–36.5)
MCV RBC AUTO: 94.1 FL (ref 80–99)
NRBC # BLD: 0 K/UL (ref 0–0.01)
NRBC BLD-RTO: 0 PER 100 WBC
PHOSPHATE SERPL-MCNC: 1.9 MG/DL (ref 2.6–4.7)
PLATELET # BLD AUTO: 233 K/UL (ref 150–400)
PMV BLD AUTO: 11 FL (ref 8.9–12.9)
POTASSIUM SERPL-SCNC: 4.1 MMOL/L (ref 3.5–5.1)
PROT SERPL-MCNC: 5.5 G/DL (ref 6.4–8.2)
RBC # BLD AUTO: 4.09 M/UL (ref 3.8–5.2)
SODIUM SERPL-SCNC: 141 MMOL/L (ref 136–145)
WBC # BLD AUTO: 12 K/UL (ref 3.6–11)

## 2022-05-24 PROCEDURE — 0T778DZ DILATION OF LEFT URETER WITH INTRALUMINAL DEVICE, VIA NATURAL OR ARTIFICIAL OPENING ENDOSCOPIC: ICD-10-PCS | Performed by: UROLOGY

## 2022-05-24 PROCEDURE — 77030020143 HC AIRWY LARYN INTUB CGAS -A: Performed by: NURSE ANESTHETIST, CERTIFIED REGISTERED

## 2022-05-24 PROCEDURE — 65270000029 HC RM PRIVATE

## 2022-05-24 PROCEDURE — 74011250636 HC RX REV CODE- 250/636: Performed by: INTERNAL MEDICINE

## 2022-05-24 PROCEDURE — 74011250637 HC RX REV CODE- 250/637: Performed by: NURSE PRACTITIONER

## 2022-05-24 PROCEDURE — 77030040361 HC SLV COMPR DVT MDII -B

## 2022-05-24 PROCEDURE — 74011000250 HC RX REV CODE- 250: Performed by: INTERNAL MEDICINE

## 2022-05-24 PROCEDURE — BT1FYZZ FLUOROSCOPY OF LEFT KIDNEY, URETER AND BLADDER USING OTHER CONTRAST: ICD-10-PCS | Performed by: UROLOGY

## 2022-05-24 PROCEDURE — 74011000258 HC RX REV CODE- 258: Performed by: NURSE ANESTHETIST, CERTIFIED REGISTERED

## 2022-05-24 PROCEDURE — 76210000034 HC AMBSU PH I REC 0.5 TO 1 HR: Performed by: UROLOGY

## 2022-05-24 PROCEDURE — 74011250637 HC RX REV CODE- 250/637: Performed by: INTERNAL MEDICINE

## 2022-05-24 PROCEDURE — 77030040922 HC BLNKT HYPOTHRM STRY -A

## 2022-05-24 PROCEDURE — 76030000000 HC AMB SURG OR TIME 0.5 TO 1: Performed by: UROLOGY

## 2022-05-24 PROCEDURE — 80053 COMPREHEN METABOLIC PANEL: CPT

## 2022-05-24 PROCEDURE — 83735 ASSAY OF MAGNESIUM: CPT

## 2022-05-24 PROCEDURE — 51798 US URINE CAPACITY MEASURE: CPT

## 2022-05-24 PROCEDURE — 36415 COLL VENOUS BLD VENIPUNCTURE: CPT

## 2022-05-24 PROCEDURE — 85027 COMPLETE CBC AUTOMATED: CPT

## 2022-05-24 PROCEDURE — 77030027138 HC INCENT SPIROMETER -A

## 2022-05-24 PROCEDURE — C2617 STENT, NON-COR, TEM W/O DEL: HCPCS | Performed by: UROLOGY

## 2022-05-24 PROCEDURE — C1769 GUIDE WIRE: HCPCS | Performed by: UROLOGY

## 2022-05-24 PROCEDURE — 74011000636 HC RX REV CODE- 636: Performed by: UROLOGY

## 2022-05-24 PROCEDURE — C1758 CATHETER, URETERAL: HCPCS | Performed by: UROLOGY

## 2022-05-24 PROCEDURE — 74011000250 HC RX REV CODE- 250: Performed by: NURSE ANESTHETIST, CERTIFIED REGISTERED

## 2022-05-24 PROCEDURE — 84100 ASSAY OF PHOSPHORUS: CPT

## 2022-05-24 PROCEDURE — 76000 FLUOROSCOPY <1 HR PHYS/QHP: CPT

## 2022-05-24 PROCEDURE — 74011250636 HC RX REV CODE- 250/636: Performed by: NURSE ANESTHETIST, CERTIFIED REGISTERED

## 2022-05-24 PROCEDURE — 94762 N-INVAS EAR/PLS OXIMTRY CONT: CPT

## 2022-05-24 PROCEDURE — 2709999900 HC NON-CHARGEABLE SUPPLY: Performed by: UROLOGY

## 2022-05-24 PROCEDURE — 76060000061 HC AMB SURG ANES 0.5 TO 1 HR: Performed by: UROLOGY

## 2022-05-24 DEVICE — URETERAL STENT
Type: IMPLANTABLE DEVICE | Site: URETER | Status: FUNCTIONAL
Brand: CONTOUR™

## 2022-05-24 RX ORDER — PROPOFOL 10 MG/ML
INJECTION, EMULSION INTRAVENOUS AS NEEDED
Status: DISCONTINUED | OUTPATIENT
Start: 2022-05-24 | End: 2022-05-24 | Stop reason: HOSPADM

## 2022-05-24 RX ORDER — SODIUM CHLORIDE 0.9 % (FLUSH) 0.9 %
5-40 SYRINGE (ML) INJECTION AS NEEDED
Status: DISCONTINUED | OUTPATIENT
Start: 2022-05-24 | End: 2022-05-24 | Stop reason: HOSPADM

## 2022-05-24 RX ORDER — SODIUM CHLORIDE, SODIUM LACTATE, POTASSIUM CHLORIDE, CALCIUM CHLORIDE 600; 310; 30; 20 MG/100ML; MG/100ML; MG/100ML; MG/100ML
100 INJECTION, SOLUTION INTRAVENOUS CONTINUOUS
Status: DISCONTINUED | OUTPATIENT
Start: 2022-05-24 | End: 2022-05-24

## 2022-05-24 RX ORDER — ONDANSETRON 2 MG/ML
INJECTION INTRAMUSCULAR; INTRAVENOUS AS NEEDED
Status: DISCONTINUED | OUTPATIENT
Start: 2022-05-24 | End: 2022-05-24 | Stop reason: HOSPADM

## 2022-05-24 RX ORDER — HYDROMORPHONE HYDROCHLORIDE 1 MG/ML
.25-1 INJECTION, SOLUTION INTRAMUSCULAR; INTRAVENOUS; SUBCUTANEOUS
Status: DISCONTINUED | OUTPATIENT
Start: 2022-05-24 | End: 2022-05-24 | Stop reason: HOSPADM

## 2022-05-24 RX ORDER — SODIUM CHLORIDE, SODIUM LACTATE, POTASSIUM CHLORIDE, CALCIUM CHLORIDE 600; 310; 30; 20 MG/100ML; MG/100ML; MG/100ML; MG/100ML
INJECTION, SOLUTION INTRAVENOUS
Status: DISCONTINUED | OUTPATIENT
Start: 2022-05-24 | End: 2022-05-24 | Stop reason: HOSPADM

## 2022-05-24 RX ORDER — MESALAMINE 1000 MG/1
1 SUPPOSITORY RECTAL
Status: DISCONTINUED | OUTPATIENT
Start: 2022-05-24 | End: 2022-06-03 | Stop reason: HOSPADM

## 2022-05-24 RX ORDER — OXYCODONE HYDROCHLORIDE 5 MG/1
5 TABLET ORAL
Status: DISCONTINUED | OUTPATIENT
Start: 2022-05-24 | End: 2022-06-02

## 2022-05-24 RX ORDER — MIDAZOLAM HYDROCHLORIDE 1 MG/ML
INJECTION, SOLUTION INTRAMUSCULAR; INTRAVENOUS AS NEEDED
Status: DISCONTINUED | OUTPATIENT
Start: 2022-05-24 | End: 2022-05-24 | Stop reason: HOSPADM

## 2022-05-24 RX ORDER — OXYCODONE HYDROCHLORIDE 5 MG/1
5 TABLET ORAL AS NEEDED
Status: DISCONTINUED | OUTPATIENT
Start: 2022-05-24 | End: 2022-05-24 | Stop reason: HOSPADM

## 2022-05-24 RX ORDER — FENTANYL CITRATE 50 UG/ML
INJECTION, SOLUTION INTRAMUSCULAR; INTRAVENOUS AS NEEDED
Status: DISCONTINUED | OUTPATIENT
Start: 2022-05-24 | End: 2022-05-24 | Stop reason: HOSPADM

## 2022-05-24 RX ORDER — ONDANSETRON 2 MG/ML
4 INJECTION INTRAMUSCULAR; INTRAVENOUS AS NEEDED
Status: DISCONTINUED | OUTPATIENT
Start: 2022-05-24 | End: 2022-05-24 | Stop reason: HOSPADM

## 2022-05-24 RX ORDER — MAGNESIUM SULFATE HEPTAHYDRATE 40 MG/ML
2 INJECTION, SOLUTION INTRAVENOUS ONCE
Status: COMPLETED | OUTPATIENT
Start: 2022-05-24 | End: 2022-05-24

## 2022-05-24 RX ORDER — SODIUM CHLORIDE, SODIUM LACTATE, POTASSIUM CHLORIDE, CALCIUM CHLORIDE 600; 310; 30; 20 MG/100ML; MG/100ML; MG/100ML; MG/100ML
75 INJECTION, SOLUTION INTRAVENOUS CONTINUOUS
Status: DISCONTINUED | OUTPATIENT
Start: 2022-05-24 | End: 2022-05-30

## 2022-05-24 RX ORDER — DIPHENHYDRAMINE HYDROCHLORIDE 50 MG/ML
INJECTION, SOLUTION INTRAMUSCULAR; INTRAVENOUS AS NEEDED
Status: DISCONTINUED | OUTPATIENT
Start: 2022-05-24 | End: 2022-05-24 | Stop reason: HOSPADM

## 2022-05-24 RX ORDER — METRONIDAZOLE 500 MG/100ML
500 INJECTION, SOLUTION INTRAVENOUS EVERY 12 HOURS
Status: DISCONTINUED | OUTPATIENT
Start: 2022-05-24 | End: 2022-05-25

## 2022-05-24 RX ORDER — CEFAZOLIN SODIUM 1 G/3ML
INJECTION, POWDER, FOR SOLUTION INTRAMUSCULAR; INTRAVENOUS AS NEEDED
Status: DISCONTINUED | OUTPATIENT
Start: 2022-05-24 | End: 2022-05-24 | Stop reason: HOSPADM

## 2022-05-24 RX ORDER — LIDOCAINE HYDROCHLORIDE 20 MG/ML
INJECTION, SOLUTION EPIDURAL; INFILTRATION; INTRACAUDAL; PERINEURAL AS NEEDED
Status: DISCONTINUED | OUTPATIENT
Start: 2022-05-24 | End: 2022-05-24 | Stop reason: HOSPADM

## 2022-05-24 RX ORDER — DIPHENHYDRAMINE HYDROCHLORIDE 50 MG/ML
12.5 INJECTION, SOLUTION INTRAMUSCULAR; INTRAVENOUS AS NEEDED
Status: DISCONTINUED | OUTPATIENT
Start: 2022-05-24 | End: 2022-05-24 | Stop reason: HOSPADM

## 2022-05-24 RX ORDER — HYDROMORPHONE HYDROCHLORIDE 1 MG/ML
1 INJECTION, SOLUTION INTRAMUSCULAR; INTRAVENOUS; SUBCUTANEOUS
Status: DISCONTINUED | OUTPATIENT
Start: 2022-05-24 | End: 2022-05-31

## 2022-05-24 RX ORDER — SODIUM CHLORIDE 0.9 % (FLUSH) 0.9 %
5-40 SYRINGE (ML) INJECTION EVERY 8 HOURS
Status: DISCONTINUED | OUTPATIENT
Start: 2022-05-24 | End: 2022-05-24 | Stop reason: HOSPADM

## 2022-05-24 RX ADMIN — OXYCODONE 5 MG: 5 TABLET ORAL at 16:59

## 2022-05-24 RX ADMIN — PROPOFOL 150 MG: 10 INJECTION, EMULSION INTRAVENOUS at 13:14

## 2022-05-24 RX ADMIN — MORPHINE SULFATE 2 MG: 2 INJECTION, SOLUTION INTRAMUSCULAR; INTRAVENOUS at 10:06

## 2022-05-24 RX ADMIN — HYDROMORPHONE HYDROCHLORIDE 1 MG: 1 INJECTION, SOLUTION INTRAMUSCULAR; INTRAVENOUS; SUBCUTANEOUS at 18:06

## 2022-05-24 RX ADMIN — FENTANYL CITRATE 25 MCG: 50 INJECTION, SOLUTION INTRAMUSCULAR; INTRAVENOUS at 13:15

## 2022-05-24 RX ADMIN — MIDAZOLAM 2 MG: 1 INJECTION, SOLUTION INTRAMUSCULAR; INTRAVENOUS at 13:07

## 2022-05-24 RX ADMIN — VANCOMYCIN HYDROCHLORIDE 500 MG: 250 POWDER, FOR SOLUTION ORAL at 22:34

## 2022-05-24 RX ADMIN — MORPHINE SULFATE 2 MG: 2 INJECTION, SOLUTION INTRAMUSCULAR; INTRAVENOUS at 15:28

## 2022-05-24 RX ADMIN — POTASSIUM PHOSPHATE, MONOBASIC POTASSIUM PHOSPHATE, DIBASIC: 224; 236 INJECTION, SOLUTION, CONCENTRATE INTRAVENOUS at 11:55

## 2022-05-24 RX ADMIN — VANCOMYCIN HYDROCHLORIDE 500 MG: 250 POWDER, FOR SOLUTION ORAL at 15:23

## 2022-05-24 RX ADMIN — SODIUM CHLORIDE, POTASSIUM CHLORIDE, SODIUM LACTATE AND CALCIUM CHLORIDE: 600; 310; 30; 20 INJECTION, SOLUTION INTRAVENOUS at 13:07

## 2022-05-24 RX ADMIN — ONDANSETRON 4 MG: 2 INJECTION INTRAMUSCULAR; INTRAVENOUS at 17:42

## 2022-05-24 RX ADMIN — METRONIDAZOLE 500 MG: 500 INJECTION, SOLUTION INTRAVENOUS at 09:42

## 2022-05-24 RX ADMIN — SODIUM CHLORIDE 80 MCG: 9 INJECTION, SOLUTION INTRAVENOUS at 13:17

## 2022-05-24 RX ADMIN — CEFAZOLIN SODIUM 1 G: 1 POWDER, FOR SOLUTION INTRAMUSCULAR; INTRAVENOUS at 13:25

## 2022-05-24 RX ADMIN — MORPHINE SULFATE 2 MG: 2 INJECTION, SOLUTION INTRAMUSCULAR; INTRAVENOUS at 00:13

## 2022-05-24 RX ADMIN — FENTANYL CITRATE 50 MCG: 50 INJECTION, SOLUTION INTRAMUSCULAR; INTRAVENOUS at 13:36

## 2022-05-24 RX ADMIN — Medication 1 CAPSULE: at 15:22

## 2022-05-24 RX ADMIN — DIPHENHYDRAMINE HYDROCHLORIDE 6.25 MG: 50 INJECTION, SOLUTION INTRAMUSCULAR; INTRAVENOUS at 13:25

## 2022-05-24 RX ADMIN — ONDANSETRON HYDROCHLORIDE 4 MG: 2 SOLUTION INTRAMUSCULAR; INTRAVENOUS at 13:25

## 2022-05-24 RX ADMIN — LIDOCAINE HYDROCHLORIDE 60 MG: 20 INJECTION, SOLUTION INTRAVENOUS at 13:14

## 2022-05-24 RX ADMIN — SODIUM CHLORIDE 80 MCG: 9 INJECTION, SOLUTION INTRAVENOUS at 13:33

## 2022-05-24 RX ADMIN — METRONIDAZOLE 500 MG: 500 INJECTION, SOLUTION INTRAVENOUS at 21:34

## 2022-05-24 RX ADMIN — PROCHLORPERAZINE EDISYLATE 5 MG: 5 INJECTION INTRAMUSCULAR; INTRAVENOUS at 00:13

## 2022-05-24 RX ADMIN — SODIUM CHLORIDE, POTASSIUM CHLORIDE, SODIUM LACTATE AND CALCIUM CHLORIDE 75 ML/HR: 600; 310; 30; 20 INJECTION, SOLUTION INTRAVENOUS at 11:56

## 2022-05-24 RX ADMIN — MAGNESIUM SULFATE HEPTAHYDRATE 2 G: 40 INJECTION, SOLUTION INTRAVENOUS at 09:44

## 2022-05-24 RX ADMIN — SODIUM CHLORIDE 80 MCG: 9 INJECTION, SOLUTION INTRAVENOUS at 13:30

## 2022-05-24 RX ADMIN — OXYCODONE 5 MG: 5 TABLET ORAL at 21:34

## 2022-05-24 RX ADMIN — HYDROMORPHONE HYDROCHLORIDE 1 MG: 1 INJECTION, SOLUTION INTRAMUSCULAR; INTRAVENOUS; SUBCUTANEOUS at 22:32

## 2022-05-24 RX ADMIN — SODIUM CHLORIDE 100 MCG: 9 INJECTION, SOLUTION INTRAVENOUS at 13:58

## 2022-05-24 RX ADMIN — VANCOMYCIN HYDROCHLORIDE 500 MG: 250 POWDER, FOR SOLUTION ORAL at 00:13

## 2022-05-24 RX ADMIN — VANCOMYCIN HYDROCHLORIDE 500 MG: 250 POWDER, FOR SOLUTION ORAL at 06:50

## 2022-05-24 RX ADMIN — SODIUM CHLORIDE 120 MCG: 9 INJECTION, SOLUTION INTRAVENOUS at 13:18

## 2022-05-24 RX ADMIN — SODIUM CHLORIDE 80 MCG: 9 INJECTION, SOLUTION INTRAVENOUS at 13:22

## 2022-05-24 RX ADMIN — FENTANYL CITRATE 25 MCG: 50 INJECTION, SOLUTION INTRAMUSCULAR; INTRAVENOUS at 13:32

## 2022-05-24 RX ADMIN — SODIUM CHLORIDE 100 MCG: 9 INJECTION, SOLUTION INTRAVENOUS at 14:00

## 2022-05-24 RX ADMIN — MORPHINE SULFATE 2 MG: 2 INJECTION, SOLUTION INTRAMUSCULAR; INTRAVENOUS at 06:56

## 2022-05-24 NOTE — PROGRESS NOTES
Urology Progress Note    Patient: Alvaro Krishna MRN: [id number]  SSN: [id number]    YOB: 1955  Age: 75 y.o. Sex: female        Assessment:     Resting in bed, NAD   AF, VSS   WBC 12 (9.6)   Cr 0.86 (0.83)   UCX negative   + CDiff    Plan:     10 mm left UPJ calculus with associated left hydronephrosis  -NPO.  Cystoscopy, bilateral RPG, left ureteral stent placement today at 1300 with Dr Arik Richardson  -Will need OP f/u to discuss definitive stone treatment    Reason For Visit:     Follow up for 10 mm left UPJ calculus with associated left hydronephrosis    ROS:     Denies fevers  Reports diffuse abdominal pain, left flank pain   Denies hematuria, frequency    Objective:     Visit Vitals  BP 96/65 (BP 1 Location: Right upper arm, BP Patient Position: At rest)   Pulse 95   Temp 98.3 °F (36.8 °C)   Resp 16   Ht 5' 1\" (1.549 m)   Wt 60.4 kg (133 lb 2.5 oz)   SpO2 95%   BMI 25.16 kg/m²         Intake/Output Summary (Last 24 hours) at 5/24/2022 0947  Last data filed at 5/24/2022 0640  Gross per 24 hour   Intake 1734 ml   Output    Net 1734 ml       Physical Exam  General: NAD  Respiratory: no distress, room air  Abdomen: soft, no distention  : left CVA tenderness  Neuro: Appropriate, no focal neurological deficits    Labs reviewed, May 24, 2022  Recent Results (from the past 24 hour(s))   MAGNESIUM    Collection Time: 05/23/22 10:12 AM   Result Value Ref Range    Magnesium 1.4 (L) 1.6 - 2.4 mg/dL   METABOLIC PANEL, COMPREHENSIVE    Collection Time: 05/23/22 10:12 AM   Result Value Ref Range    Sodium 144 136 - 145 mmol/L    Potassium 3.6 3.5 - 5.1 mmol/L    Chloride 114 (H) 97 - 108 mmol/L    CO2 24 21 - 32 mmol/L    Anion gap 6 5 - 15 mmol/L    Glucose 103 (H) 65 - 100 mg/dL    BUN 7 6 - 20 MG/DL    Creatinine 0.83 0.55 - 1.02 MG/DL    BUN/Creatinine ratio 8 (L) 12 - 20      GFR est AA >60 >60 ml/min/1.73m2    GFR est non-AA >60 >60 ml/min/1.73m2    Calcium 8.2 (L) 8.5 - 10.1 MG/DL    Bilirubin, total 0.4 0.2 - 1.0 MG/DL    ALT (SGPT) 10 (L) 12 - 78 U/L    AST (SGOT) 11 (L) 15 - 37 U/L    Alk. phosphatase 56 45 - 117 U/L    Protein, total 5.5 (L) 6.4 - 8.2 g/dL    Albumin 2.1 (L) 3.5 - 5.0 g/dL    Globulin 3.4 2.0 - 4.0 g/dL    A-G Ratio 0.6 (L) 1.1 - 2.2     PHOSPHORUS    Collection Time: 05/23/22 10:12 AM   Result Value Ref Range    Phosphorus 2.0 (L) 2.6 - 4.7 MG/DL   CBC W/O DIFF    Collection Time: 05/24/22 12:23 AM   Result Value Ref Range    WBC 12.0 (H) 3.6 - 11.0 K/uL    RBC 4.09 3.80 - 5.20 M/uL    HGB 12.6 11.5 - 16.0 g/dL    HCT 38.5 35.0 - 47.0 %    MCV 94.1 80.0 - 99.0 FL    MCH 30.8 26.0 - 34.0 PG    MCHC 32.7 30.0 - 36.5 g/dL    RDW 13.7 11.5 - 14.5 %    PLATELET 618 724 - 691 K/uL    MPV 11.0 8.9 - 12.9 FL    NRBC 0.0 0  WBC    ABSOLUTE NRBC 0.00 0.00 - 0.01 K/uL   MAGNESIUM    Collection Time: 05/24/22 12:23 AM   Result Value Ref Range    Magnesium 1.4 (L) 1.6 - 2.4 mg/dL   METABOLIC PANEL, COMPREHENSIVE    Collection Time: 05/24/22 12:23 AM   Result Value Ref Range    Sodium 141 136 - 145 mmol/L    Potassium 4.1 3.5 - 5.1 mmol/L    Chloride 114 (H) 97 - 108 mmol/L    CO2 21 21 - 32 mmol/L    Anion gap 6 5 - 15 mmol/L    Glucose 119 (H) 65 - 100 mg/dL    BUN 6 6 - 20 MG/DL    Creatinine 0.86 0.55 - 1.02 MG/DL    BUN/Creatinine ratio 7 (L) 12 - 20      GFR est AA >60 >60 ml/min/1.73m2    GFR est non-AA >60 >60 ml/min/1.73m2    Calcium 8.4 (L) 8.5 - 10.1 MG/DL    Bilirubin, total 0.2 0.2 - 1.0 MG/DL    ALT (SGPT) 10 (L) 12 - 78 U/L    AST (SGOT) 10 (L) 15 - 37 U/L    Alk.  phosphatase 55 45 - 117 U/L    Protein, total 5.5 (L) 6.4 - 8.2 g/dL    Albumin 2.0 (L) 3.5 - 5.0 g/dL    Globulin 3.5 2.0 - 4.0 g/dL    A-G Ratio 0.6 (L) 1.1 - 2.2     PHOSPHORUS    Collection Time: 05/24/22 12:23 AM   Result Value Ref Range    Phosphorus 1.9 (L) 2.6 - 4.7 MG/DL       Imaging:  CT Results  (Last 48 hours)    None          Signed By: Marleni Ugarte NP - May 24, 2022

## 2022-05-24 NOTE — PROGRESS NOTES
OZIEL:   1) Home with family and follow up appointments   2) Pt's family will drive pt home at time of discharge and as needed  3) Risk of readmission- 9% [place] [address] Day 3:   11:43 AM- Pt remains on Med. Surg- plans for stent to be placed today at 1:00 PM. Per IDRS- pt to get a stent placed today and d/c this evening vs. Tomorrow. CM spoke with pt regarding anticipated d/c date of tomorrow to discus transportation- pt became very agitated with CM stating \"No, no no I am not going anywhere tomorrow or anytime soon\" CM advised pt this was part of our protocol- identifying transportation before discharge and pt declined to provide CM with information stating she will do so as it gets closer to d/c. Anticipate pt to go home with family and follow up appointments- CM will continue to follow and assist as needed.      Adele Morgan, MSW, [phone]

## 2022-05-24 NOTE — BRIEF OP NOTE
Brief Postoperative Note    Patient: Dereje Majano  YOB: 1955  MRN: [id number]    Date of Procedure: 5/24/2022     Pre-Op Diagnosis: L RENAL CALCULUS    Post-Op Diagnosis: Same as preoperative diagnosis. Procedure(s):  CYSTOSCOPY WITH L  RETROGRADES LEFT STENT INSERTION  . Surgeon(s):  Edilberto Davis MD    Surgical Assistant: None    Anesthesia: General     Estimated Blood Loss (mL): Minimal    Complications: None    Specimens: * No specimens in log *     Implants:   Implant Name Type Inv.  Item Serial No.  Lot No. LRB No. Used Action   STENT URET 6FR L24CM PERCFLX HYDR+ SFT PGTL TAPR TIP W/O - SNA  STENT URET 6FR L24CM PERCFLX HYDR+ SFT PGTL TAPR TIP W/O NA [employer] UROLOGY_WD [id number] Left 1 Implanted       Drains: * No LDAs found *    Findings: L upj stone    Electronically Signed by Carmine Lester MD on 5/24/2022 at 1:45 PM

## 2022-05-24 NOTE — PROGRESS NOTES
Progress Note  Date:2022       Room:[facility]  Patient Name:Betsy Albright     YOB: 1955     Age:67 y.o. Subjective    Subjective:  Symptoms:  She reports diarrhea. Diet:  Adequate intake. No nausea or vomiting. Pain:  She complains of pain that is mild. Review of Systems   Constitutional: Positive for fatigue and fever. Negative for appetite change. Gastrointestinal: Positive for abdominal pain, blood in stool and diarrhea. Negative for nausea and vomiting. Skin: Negative for pallor. Objective         Vitals Last 24 Hours:  TEMPERATURE:  Temp  Av.8 °F (37.1 °C)  Min: 98 °F (36.7 °C)  Max: 99.6 °F (37.6 °C)  RESPIRATIONS RANGE: Resp  Av.7  Min: 16  Max: 18  PULSE OXIMETRY RANGE: SpO2  Av %  Min: 93 %  Max: 100 %  PULSE RANGE: Pulse  Av.2  Min: 95  Max: 108  BLOOD PRESSURE RANGE: Systolic (27KCP), EHH:279 , Min:96 , ZTE:978   ; Diastolic (67VBX), QT, Min:60, Max:70    I/O (24Hr): Intake/Output Summary (Last 24 hours) at 2022 0940  Last data filed at 2022 0640  Gross per 24 hour   Intake 1734 ml   Output --   Net 1734 ml     Objective:  General Appearance:  Comfortable. Vital signs: (most recent): Blood pressure 96/65, pulse 95, temperature 98.3 °F (36.8 °C), resp. rate 16, height 5' 1\" (1.549 m), weight 60.4 kg (133 lb 2.5 oz), SpO2 95 %. Fever. Output: Producing stool. HEENT: Normal HEENT exam.    Lungs:  Normal effort and normal respiratory rate. Breath sounds clear to auscultation. Heart: Normal rate. Regular rhythm. S1 normal and S2 normal.  No murmur. Abdomen: Abdomen is soft and non-distended. Bowel sounds are normal.   There is generalized tenderness. There is right lower quadrant tenderness. Extremities: Normal range of motion. There is no dependent edema. Pulses: Distal pulses are intact. Neurological: Patient is alert and oriented to person, place and time.     Pupils: Pupils are equal, round, and reactive to light. Skin:  Warm and dry. Labs/Imaging/Diagnostics    Labs:  CBC:  Recent Labs     05/24/22  0023 05/23/22  0553 05/22/22  0237   WBC 12.0* 9.6 9.7   RBC 4.09 4.31 4.14   HGB 12.6 13.2 12.8   HCT 38.5 40.8 38.6   MCV 94.1 94.7 93.2   RDW 13.7 13.5 13.6    250 211     CHEMISTRIES:  Recent Labs     05/24/22  0023 05/23/22  1012 05/22/22  0237    144 143   K 4.1 3.6 3.2*   * 114* 112*   CO2 21 24 22   BUN 6 7 17   CA 8.4* 8.2* 8.0*   PHOS 1.9* 2.0*  --    MG 1.4* 1.4* 1.4*   PT/INR:No results for input(s): INR, INREXT, INREXT in the last 72 hours. No lab exists for component: PROTIME  APTT:No results for input(s): APTT in the last 72 hours. LIVER PROFILE:  Recent Labs     05/24/22  0023 05/23/22  1012 05/22/22  0237   AST 10* 11* 10*   ALT 10* 10* 11*     Lab Results   Component Value Date/Time    ALT (SGPT) 10 (L) 05/24/2022 12:23 AM    AST (SGOT) 10 (L) 05/24/2022 12:23 AM    Alk. phosphatase 55 05/24/2022 12:23 AM    Bilirubin, total 0.2 05/24/2022 12:23 AM       Imaging Last 24 Hours:  No results found. Assessment//Plan   Principal Problem:    Clostridium difficile diarrhea (5/22/2022)    Active Problems:    Nephrolithiasis (5/26/2021)      Hydronephrosis (5/26/2021)      Flank pain (5/26/2021)      Renal insufficiency (5/26/2021)      Bandemia (5/21/2022)      Hypokalemia (5/21/2022)      Dehydration (5/21/2022)      Colitis (5/21/2022)      Assessment:  (60-year-old female who in March was diagnosed with ulcerative proctitis and follows with my partner Dr. Belén Whitten.  She was initially placed on prednisone taper and mesalamine suppositories. More recently she notes worsening GI symptoms which include abdominal pain, nausea, and diarrhea. She tells me she has had 15 bowel movements already today, most of which are watery. Blood noted as well. She is not anemic though inflammatory markers are elevated. Stool positive for C.  Difficile, she has been started on oral vancomycin and IV Flagyl. CT scan shows diffuse thickening up to the rectosigmoid junction as well as some thickening within the transverse colon. Last colonoscopy 3/2022 by Dr. Vignesh Scheaffer with ulcerative proctitis. 5/23/2022: Reports ongoing diarrhea, unchanged by vancomycin. No rectal bleeding. Mild lower abdominal pain that is worse in the right lower quadrant on exam.  No nausea or vomiting. Continues to run a fever, T-max 100.4.    5/24/2022: Vancomycin increased to 500 mg yesterday. Diarrhea seems less. Had diarrhea first thing this morning and reports rectal bleeding that she says has been going on for a year although yesterday she said she was not having rectal bleeding. Ulcerative proctitis diagnosed 3/2022. She says the [facility]. suppositories have never helped and she stopped taking them at home. Abdominal pain is less but persists to some degree. Some nausea this morning. For cystoscopy with ureteral stent placement today. ). Plan:   (- Continue vancomycin. Dr. Jodie Reid discussed possible fecal transplant which she states she was told may be able to be done on Friday but doubtful this could be arranged for this week as there is some work up front to be done prior to these procedures in terms of procuring the fecal sample. Hopefully she will have a good response to vancomycin and we can move forward with outpatient follow-up. - Will discontinue Anusol suppository and start Canasa at HS. ). Electronically signed by Miriam Rayo NP on 5/24/2022 at 3:58 PM    Hoping to see improvement with vancomycin therapy. If she has failure to respond would recommend change to dificid / fidaxomicin prior to consideration for FMT as there are concerns for transmission of SARS-CoV-2 with that treatment.   Eryn Mcintosh MD

## 2022-05-24 NOTE — ANESTHESIA PREPROCEDURE EVALUATION
Relevant Problems   RENAL FAILURE   (+) Hydronephrosis   (+) Nephrolithiasis   (+) Renal insufficiency       Anesthetic History   No history of anesthetic complications            Review of Systems / Medical History  Patient summary reviewed and pertinent labs reviewed    Pulmonary  Within defined limits                 Neuro/Psych         TIA  Pertinent negatives: No seizures and CVA  Comments: Pt reports remote TIA in setting of polycythemia Cardiovascular                Pertinent negatives: No past MI, angina and CHF  Exercise tolerance: >4 METS     GI/Hepatic/Renal         Renal disease: stones  Hiatal hernia  Pertinent negatives: No GERD  Comments: Pt endorses hiatal hernia but denies uncontrolled GERD Endo/Other  Within defined limits        Pertinent negatives: No diabetes   Other Findings   Comments: C. diff           Physical Exam    Airway  Mallampati: III  TM Distance: 4 - 6 cm  Neck ROM: normal range of motion   Mouth opening: Normal     Cardiovascular      Rate: normal         Dental    Dentition: Poor dentition     Pulmonary  Breath sounds clear to auscultation               Abdominal  GI exam deferred       Other Findings            Anesthetic Plan    ASA: 2  Anesthesia type: general          Induction: Intravenous  Anesthetic plan and risks discussed with: Patient      GA, LMA

## 2022-05-24 NOTE — ANESTHESIA POSTPROCEDURE EVALUATION
Procedure(s):  CYSTOSCOPY WITH BILATERAL RETROGRADES LEFT STENT INSERTION  . .    general    Anesthesia Post Evaluation        Patient location during evaluation: PACU  Patient participation: complete - patient participated  Level of consciousness: sleepy but conscious  Pain management: adequate  Airway patency: patent  Anesthetic complications: no  Cardiovascular status: acceptable and stable  Respiratory status: acceptable and unassisted  Hydration status: acceptable  Comments: The patient was seen and evaluated in the post-operative period. The time of my evaluation may not match the time of this note. The patient denied uncontrolled pain or nausea, and there were no significant complications evident.     Jaguar Gutierres MD      Post anesthesia nausea and vomiting:  none  Final Post Anesthesia Temperature Assessment:  Normothermia (36.0-37.5 degrees C)      INITIAL Post-op Vital signs:   Vitals Value Taken Time   /65 05/24/22 1410   Temp 35.6 °C (96 °F) 05/24/22 1403   Pulse 80 05/24/22 1410   Resp 20 05/24/22 1410   SpO2 98 % 05/24/22 1410

## 2022-05-24 NOTE — OP NOTES
Shaan Egan [facility] 78  OPERATIVE REPORT    Name:  Jt Ahumada  MR#:  [id number]  :  1955  ACCOUNT #:  [de-identified]  DATE OF SERVICE:  2022    PREOPERATIVE DIAGNOSIS:  Obstructing left ureteropelvic junction stone. POSTOPERATIVE DIAGNOSIS:  Obstructing left ureteropelvic junction stone. PROCEDURES PERFORMED:  Cystoscopy, left retrograde pyelogram, left 6 x 24 stent. SURGEON:  Christel Khan MD    ASSISTANT:  None. ANESTHESIA:  General.    COMPLICATIONS:  0.    SPECIMENS REMOVED:  None. IMPLANTS:  6 x 24 stent. ESTIMATED BLOOD LOSS:  Minimal.    INDICATIONS:  The patient was admitted to the hospital and being treated for C. diff colitis. CT scan showed she had a left UPJ stone with hydronephrosis. Urine cultures negative. She is being brought to the operating room to undergo cysto and stent placement while she is being treated for her C. diff colitis. FINDINGS AT THE TIME OF THE PROCEDURE:  Obstructing left UPJ stone with hydronephrosis above it. PROCEDURE:  After consent was obtained, the patient was taken to the operating room. After adequate anesthesia was obtained, she was prepped and draped in the lithotomy position. A rigid cystoscope was inserted in the bladder and the bladder viewed in its entirety. Trigone was unremarkable. No lesions were seen. I then engaged the left ureter with an open-ended catheter and injected contrast retrograde. There was no evidence of mass filling defect or obstruction of lower ureter. There was a filling defect and irregularities at the UPJ consistent with her stone on the left. There was dilation of the pelvic calyceal system above that. I then passed a sensor wire through the open-ended catheter and up into the upper pole calyx. The open-ended catheter was removed leaving the wire in place. I then passed a 6 x 24 stent.   The proximal end coiled in the renal pelvis and the distal end coiled within the bladder. A short string was left attached and deployed in the bladder. The bladder was then drained. The scope was withdrawn. She was then awakened from anesthesia and taken to the recovery room in stable condition. She will be admitted for continued antibiotic care for her C. diff colitis. We will monitor her along with you. We will arrange for followup as an outpatient and decide on definitive stone treatment once her C. diff has resolved.       Adela Medellin MD [id number]  D:  05/24/2022 13:52  T:  05/24/2022 18:27  JOB #:  [id number]

## 2022-05-24 NOTE — ROUTINE PROCESS
TRANSFER - OUT REPORT:    Verbal report given to CHANCE Louis(name) on Alvaro Krishna  being transferred to Room 519(unit) for routine post - op       Report consisted of patients Situation, Background, Assessment and   Recommendations(SBAR). Information from the following report(s) SBAR, MAR, IN/ OUT, NSR was reviewed with the receiving nurse. Lines:   Peripheral IV 05/23/22 Anterior; Left Forearm (Active)   Site Assessment Clean, dry, & intact 05/24/22 1435   Phlebitis Assessment 0 05/24/22 1435   Infiltration Assessment 0 05/24/22 1435   Dressing Status Clean, dry, & intact 05/24/22 1435   Dressing Type 4 X 4;Tape;Transparent 05/24/22 1435   Hub Color/Line Status Pink;Patent 05/24/22 1435   Action Taken Open ports on tubing capped 05/24/22 0800   Alcohol Cap Used Yes 05/24/22 1435        Opportunity for questions and clarification was provided.       Patient transported with:   Registered Nurse

## 2022-05-24 NOTE — PROGRESS NOTES
Problem: Risk for Spread of Infection  Goal: Prevent transmission of infectious organism to others  Description: Prevent the transmission of infectious organisms to other patients, staff members, and visitors. Outcome: Progressing Towards Goal     Problem: Falls - Risk of  Goal: *Absence of Falls  Description: Document Pearl Garcias Fall Risk and appropriate interventions in the flowsheet. Outcome: Progressing Towards Goal  Note: Fall Risk Interventions:            Medication Interventions: Evaluate medications/consider consulting pharmacy,Patient to call before getting OOB,Teach patient to arise slowly    Elimination Interventions: Call light in reach              Problem: Patient Education: Go to Patient Education Activity  Goal: Patient/Family Education  Outcome: Progressing Towards Goal     Problem:  Activity Intolerance  Goal: *Oxygen saturation during activity within specified parameters  Outcome: Progressing Towards Goal  Goal: *Able to remain out of bed as prescribed  Outcome: Progressing Towards Goal     Problem: Patient Education: Go to Patient Education Activity  Goal: Patient/Family Education  Outcome: Progressing Towards Goal

## 2022-05-24 NOTE — PROGRESS NOTES
Shaan Egan [facility]  [address] [facility], [address] [place] [address]  [phone]      Medical Progress Note      NAME: Adrienne Acosta   :  1955  MRM:  [id number]    Date/Time of service: 2022  2:36 PM       Subjective:     Chief Complaint:  Patient was personally seen and examined by me during this time period. Chart reviewed. S/p cysto and L ureteral stent       Objective:       Vitals:       Last 24hrs VS reviewed since prior progress note.  Most recent are:    Visit Vitals  /78   Pulse 98   Temp (!) 96 °F (35.6 °C)   Resp 18   Ht 5' 1\" (1.549 m)   Wt 60.4 kg (133 lb 2.5 oz)   SpO2 95%   BMI 25.16 kg/m²     SpO2 Readings from Last 6 Encounters:   22 95%   22 95%   21 95%   10/18/17 100%    O2 Flow Rate (L/min): 2 l/min       Intake/Output Summary (Last 24 hours) at 2022 1436  Last data filed at 2022 1432  Gross per 24 hour   Intake 2617.33 ml   Output 2 ml   Net 2615.33 ml        Exam:     Physical Exam:    Gen:  Elderly, ill-appearing, NAD  HEENT:  Pink conjunctivae, PERRL, hearing intact to voice, moist mucous membranes  Neck:  Supple, without masses, thyroid non-tender  Resp:  No accessory muscle use, clear breath sounds without wheezes rales or rhonchi  Card:  No murmurs, normal S1, S2 without thrills, bruits or peripheral edema  Abd:  Soft, non-tender, non-distended, normoactive bowel sounds are present  Musc:  No cyanosis or clubbing  Skin:  No rashes  Neuro:  Cranial nerves 3-12 are grossly intact, follows commands appropriately  Psych:  poor insight, oriented to person, place and time, alert    Medications Reviewed: (see below)    Lab Data Reviewed: (see below)    ______________________________________________________________________    Medications:     Current Facility-Administered Medications   Medication Dose Route Frequency    oxyCODONE IR (ROXICODONE) tablet 5 mg  5 mg Oral Q4H PRN    lactated Ringers infusion  75 mL/hr IntraVENous CONTINUOUS    potassium phosphate 30 mmol in 0.9% sodium chloride 250 mL infusion   IntraVENous ONCE    metroNIDAZOLE (FLAGYL) IVPB premix 500 mg  500 mg IntraVENous Q12H    mesalamine (CANASA) suppository 1,000 mg  1 Suppository Rectal QHS    lactated Ringers infusion  100 mL/hr IntraVENous CONTINUOUS    sodium chloride (NS) flush 5-40 mL  5-40 mL IntraVENous Q8H    sodium chloride (NS) flush 5-40 mL  5-40 mL IntraVENous PRN    oxyCODONE IR (ROXICODONE) tablet 5 mg  5 mg Oral PRN    HYDROmorphone (DILAUDID) syringe 0.25-1 mg  0.25-1 mg IntraVENous Multiple    ondansetron (ZOFRAN) injection 4 mg  4 mg IntraVENous PRN    diphenhydrAMINE (BENADRYL) injection 12.5 mg  12.5 mg IntraVENous PRN    diatrizoate meglumine (ISAAK 30) 30 % solution soln    PRN    vancomycin (FIRVANQ) 50 mg/mL oral solution 500 mg  500 mg Oral Q6H    L.acidophilus-paracasei-S.thermophil-bifidobacter (RISAQUAD) 8 billion cell capsule  1 Capsule Oral DAILY    prochlorperazine (COMPAZINE) injection 5 mg  5 mg IntraVENous Q6H PRN    morphine injection 2 mg  2 mg IntraVENous Q3H PRN    acetaminophen (TYLENOL) tablet 650 mg  650 mg Oral Q6H PRN    polyethylene glycol (MIRALAX) packet 17 g  17 g Oral DAILY PRN    ondansetron (ZOFRAN) injection 4 mg  4 mg IntraVENous Q6H PRN    enoxaparin (LOVENOX) injection 40 mg  40 mg SubCUTAneous DAILY          Lab Review:     Recent Labs     05/24/22  0023 05/23/22  0553 05/22/22  0237   WBC 12.0* 9.6 9.7   HGB 12.6 13.2 12.8   HCT 38.5 40.8 38.6    250 211     Recent Labs     05/24/22  0023 05/23/22  1012 05/22/22  0237    144 143   K 4.1 3.6 3.2*   * 114* 112*   CO2 21 24 22   * 103* 102*   BUN 6 7 17   CREA 0.86 0.83 0.81   CA 8.4* 8.2* 8.0*   MG 1.4* 1.4* 1.4*   PHOS 1.9* 2.0*  --    ALB 2.0* 2.1* 2.1*   TBILI 0.2 0.4 0.3   ALT 10* 10* 11*     Lab Results   Component Value Date/Time    Glucose (POC) 93 10/16/2017 09:19 AM    Glucose (POC) 116 (H) 10/15/2017 02:12 PM Glucose (POC) 141 (H) 10/14/2017 01:14 PM          Assessment / Plan:     76 yo hx of ulcerative colitis, presented w/ sepsis, C-diff colitis, L hydronephrosis w/ obstructing stone, SPENCER    1) C-diff colitis: sepsis POA, now improving. Cont PO Vanc. Add IV flagyl, pro-biotics. GI following, plan for fecal transplant    2) L hydronephrosis from an obstructing stone: s/p cysto and L ureteral stent by Urology on 04/03    3) Complicated UTI due to obstructing stone: urine Cx neg. Will hold on systemic IV abx due to C-diff    4) UC: cont mesalamine.   Management per GI    5) HypoMg/phos: replete IV    Total time spent with patient care: 30 Minutes **I personally saw and examined the patient during this time period**                 Care Plan discussed with: Patient, nursing     Discussed:  Care Plan    Prophylaxis:  Lovenox    Disposition:  SNF/LTC           ___________________________________________________    Attending Physician: Teddy Kevin MD

## 2022-05-24 NOTE — PROGRESS NOTES
1751  Patient still c/o 10/10 LLQ aching pain not relieved by morphine nor oxycodone. No active bleeding noted. Soft abdomen. Dr. Clayton Lennox notified with order to bladder scan patient and change PRN morphine to DILAUDID IV 1mg Q4hrs PRN for severe pain. Read back and verified. 1810  Patient relieved from Nausea. Bladder scan done= 77mL noted. PRN IV dilaudid administered. Will continue to monitor. 1830  Patient stated feeling much better after PRN IV dilaudid. Will continue to monitor. 1915  Bedside and Verbal shift change report given to Conrado (oncoming nurse) by Elisabeth Sung RN (offgoing nurse). Report included the following information SBAR, Kardex, Intake/Output and Recent Results.

## 2022-05-25 LAB
ANION GAP SERPL CALC-SCNC: 8 MMOL/L (ref 5–15)
BUN SERPL-MCNC: 12 MG/DL (ref 6–20)
BUN/CREAT SERPL: 14 (ref 12–20)
CALCIUM SERPL-MCNC: 8.2 MG/DL (ref 8.5–10.1)
CHLORIDE SERPL-SCNC: 110 MMOL/L (ref 97–108)
CO2 SERPL-SCNC: 23 MMOL/L (ref 21–32)
CREAT SERPL-MCNC: 0.87 MG/DL (ref 0.55–1.02)
ERYTHROCYTE [DISTWIDTH] IN BLOOD BY AUTOMATED COUNT: 13.7 % (ref 11.5–14.5)
GLUCOSE SERPL-MCNC: 136 MG/DL (ref 65–100)
HCT VFR BLD AUTO: 37.5 % (ref 35–47)
HGB BLD-MCNC: 12.2 G/DL (ref 11.5–16)
MAGNESIUM SERPL-MCNC: 2 MG/DL (ref 1.6–2.4)
MCH RBC QN AUTO: 30.4 PG (ref 26–34)
MCHC RBC AUTO-ENTMCNC: 32.5 G/DL (ref 30–36.5)
MCV RBC AUTO: 93.5 FL (ref 80–99)
NRBC # BLD: 0 K/UL (ref 0–0.01)
NRBC BLD-RTO: 0 PER 100 WBC
PHOSPHATE SERPL-MCNC: 2.8 MG/DL (ref 2.6–4.7)
PLATELET # BLD AUTO: 256 K/UL (ref 150–400)
PMV BLD AUTO: 11 FL (ref 8.9–12.9)
POTASSIUM SERPL-SCNC: 4.4 MMOL/L (ref 3.5–5.1)
RBC # BLD AUTO: 4.01 M/UL (ref 3.8–5.2)
SODIUM SERPL-SCNC: 141 MMOL/L (ref 136–145)
WBC # BLD AUTO: 11.8 K/UL (ref 3.6–11)

## 2022-05-25 PROCEDURE — 80048 BASIC METABOLIC PNL TOTAL CA: CPT

## 2022-05-25 PROCEDURE — 74011250637 HC RX REV CODE- 250/637: Performed by: NURSE PRACTITIONER

## 2022-05-25 PROCEDURE — 74011250636 HC RX REV CODE- 250/636: Performed by: INTERNAL MEDICINE

## 2022-05-25 PROCEDURE — 74011250637 HC RX REV CODE- 250/637: Performed by: INTERNAL MEDICINE

## 2022-05-25 PROCEDURE — 36415 COLL VENOUS BLD VENIPUNCTURE: CPT

## 2022-05-25 PROCEDURE — 85027 COMPLETE CBC AUTOMATED: CPT

## 2022-05-25 PROCEDURE — 84100 ASSAY OF PHOSPHORUS: CPT

## 2022-05-25 PROCEDURE — 65270000029 HC RM PRIVATE

## 2022-05-25 PROCEDURE — 83735 ASSAY OF MAGNESIUM: CPT

## 2022-05-25 RX ORDER — PANTOPRAZOLE SODIUM 40 MG/1
40 TABLET, DELAYED RELEASE ORAL DAILY
Status: DISCONTINUED | OUTPATIENT
Start: 2022-05-25 | End: 2022-06-02

## 2022-05-25 RX ADMIN — ONDANSETRON 4 MG: 2 INJECTION INTRAMUSCULAR; INTRAVENOUS at 01:42

## 2022-05-25 RX ADMIN — OXYCODONE 5 MG: 5 TABLET ORAL at 01:42

## 2022-05-25 RX ADMIN — SODIUM CHLORIDE, POTASSIUM CHLORIDE, SODIUM LACTATE AND CALCIUM CHLORIDE 75 ML/HR: 600; 310; 30; 20 INJECTION, SOLUTION INTRAVENOUS at 06:22

## 2022-05-25 RX ADMIN — OXYCODONE 5 MG: 5 TABLET ORAL at 06:37

## 2022-05-25 RX ADMIN — ENOXAPARIN SODIUM 40 MG: 100 INJECTION SUBCUTANEOUS at 08:23

## 2022-05-25 RX ADMIN — METRONIDAZOLE 500 MG: 500 INJECTION, SOLUTION INTRAVENOUS at 08:23

## 2022-05-25 RX ADMIN — HYDROMORPHONE HYDROCHLORIDE 1 MG: 1 INJECTION, SOLUTION INTRAMUSCULAR; INTRAVENOUS; SUBCUTANEOUS at 03:00

## 2022-05-25 RX ADMIN — OXYCODONE 5 MG: 5 TABLET ORAL at 15:49

## 2022-05-25 RX ADMIN — OXYCODONE 5 MG: 5 TABLET ORAL at 11:18

## 2022-05-25 RX ADMIN — HYDROMORPHONE HYDROCHLORIDE 1 MG: 1 INJECTION, SOLUTION INTRAMUSCULAR; INTRAVENOUS; SUBCUTANEOUS at 08:23

## 2022-05-25 RX ADMIN — VANCOMYCIN HYDROCHLORIDE 500 MG: 250 POWDER, FOR SOLUTION ORAL at 08:44

## 2022-05-25 RX ADMIN — HYDROMORPHONE HYDROCHLORIDE 1 MG: 1 INJECTION, SOLUTION INTRAMUSCULAR; INTRAVENOUS; SUBCUTANEOUS at 13:30

## 2022-05-25 RX ADMIN — Medication 1 CAPSULE: at 08:23

## 2022-05-25 RX ADMIN — VANCOMYCIN HYDROCHLORIDE 500 MG: 250 POWDER, FOR SOLUTION ORAL at 20:22

## 2022-05-25 RX ADMIN — VANCOMYCIN HYDROCHLORIDE 500 MG: 250 POWDER, FOR SOLUTION ORAL at 03:00

## 2022-05-25 RX ADMIN — MESALAMINE 1000 MG: 1000 SUPPOSITORY RECTAL at 21:00

## 2022-05-25 RX ADMIN — HYDROMORPHONE HYDROCHLORIDE 1 MG: 1 INJECTION, SOLUTION INTRAMUSCULAR; INTRAVENOUS; SUBCUTANEOUS at 18:36

## 2022-05-25 RX ADMIN — VANCOMYCIN HYDROCHLORIDE 500 MG: 250 POWDER, FOR SOLUTION ORAL at 15:49

## 2022-05-25 NOTE — PROGRESS NOTES
Progress Note  Date:2022       Room:[facility]  Patient Name:Betsy Cisneros     YOB: 1955     Age:67 y.o. Subjective    Subjective:  Symptoms:  She reports diarrhea. Diet:  Adequate intake. No nausea or vomiting. Pain:  She complains of pain that is mild. Review of Systems   Constitutional: Positive for fatigue. Negative for appetite change and fever. Gastrointestinal: Positive for abdominal pain, blood in stool and diarrhea. Negative for nausea and vomiting. Skin: Negative for pallor. Objective         Vitals Last 24 Hours:  TEMPERATURE:  Temp  Av.9 °F (36.6 °C)  Min: 96 °F (35.6 °C)  Max: 98.9 °F (37.2 °C)  RESPIRATIONS RANGE: Resp  Av.9  Min: 16  Max: 22  PULSE OXIMETRY RANGE: SpO2  Av.8 %  Min: 93 %  Max: 99 %  PULSE RANGE: Pulse  Av.6  Min: 55  Max: 109  BLOOD PRESSURE RANGE: Systolic (47XTW), UVP:331 , Min:82 , EJB:328   ; Diastolic (95EDP), ZGB:71, Min:55, Max:78    I/O (24Hr): Intake/Output Summary (Last 24 hours) at 2022 0945  Last data filed at 2022 0210  Gross per 24 hour   Intake 2107.5 ml   Output 2 ml   Net 2105.5 ml     Objective:  General Appearance:  Comfortable. Vital signs: (most recent): Blood pressure 120/73, pulse 95, temperature 98.6 °F (37 °C), resp. rate 16, height 5' 1\" (1.549 m), weight 60.4 kg (133 lb 2.5 oz), SpO2 94 %. No fever. Output: Producing stool. HEENT: Normal HEENT exam.    Lungs:  Normal effort and normal respiratory rate. Breath sounds clear to auscultation. Heart: Normal rate. Regular rhythm. S1 normal and S2 normal.  No murmur. Abdomen: Abdomen is soft and non-distended. Bowel sounds are normal.     Extremities: Normal range of motion. There is no dependent edema. Pulses: Distal pulses are intact. Neurological: Patient is alert and oriented to person, place and time. Pupils:  Pupils are equal, round, and reactive to light.     Skin:  Warm and dry.      Labs/Imaging/Diagnostics    Labs:  CBC:  Recent Labs     05/25/22  0302 05/24/22  0023 05/23/22  0553   WBC 11.8* 12.0* 9.6   RBC 4.01 4.09 4.31   HGB 12.2 12.6 13.2   HCT 37.5 38.5 40.8   MCV 93.5 94.1 94.7   RDW 13.7 13.7 13.5    233 250     CHEMISTRIES:  Recent Labs     05/25/22  0302 05/24/22  0023 05/23/22  1012    141 144   K 4.4 4.1 3.6   * 114* 114*   CO2 23 21 24   BUN 12 6 7   CA 8.2* 8.4* 8.2*   PHOS 2.8 1.9* 2.0*   MG 2.0 1.4* 1.4*   PT/INR:No results for input(s): INR, INREXT, INREXT in the last 72 hours. No lab exists for component: PROTIME  APTT:No results for input(s): APTT in the last 72 hours. LIVER PROFILE:  Recent Labs     05/24/22  0023 05/23/22  1012   AST 10* 11*   ALT 10* 10*     Lab Results   Component Value Date/Time    ALT (SGPT) 10 (L) 05/24/2022 12:23 AM    AST (SGOT) 10 (L) 05/24/2022 12:23 AM    Alk. phosphatase 55 05/24/2022 12:23 AM    Bilirubin, total 0.2 05/24/2022 12:23 AM       Imaging Last 24 Hours:  XR FLUOROSCOPY UNDER 60 MINUTES    Result Date: 5/24/2022  INTERPRETATION PROVIDED FOR COMPLIANCE ONLY AT NO CHARGE FINDINGS: Procedural fluoroscopy was utilized by urology during ureteroscopy and stent placement. No operative complication is evident. .    Procedural fluoroscopy supervised by urology. Assessment//Plan   Principal Problem:    Clostridium difficile diarrhea (5/22/2022)    Active Problems:    Nephrolithiasis (5/26/2021)      Hydronephrosis (5/26/2021)      Flank pain (5/26/2021)      Renal insufficiency (5/26/2021)      Bandemia (5/21/2022)      Hypokalemia (5/21/2022)      Dehydration (5/21/2022)      Colitis (5/21/2022)      Assessment:  (69-year-old female who in March was diagnosed with ulcerative proctitis and follows with my partner Dr. Armando Pace.  She was initially placed on prednisone taper and mesalamine suppositories. More recently she notes worsening GI symptoms which include abdominal pain, nausea, and diarrhea.   She tells me she has had 15 bowel movements already today, most of which are watery. Blood noted as well. She is not anemic though inflammatory markers are elevated. Stool positive for C. Difficile, she has been started on oral vancomycin and IV Flagyl. CT scan shows diffuse thickening up to the rectosigmoid junction as well as some thickening within the transverse colon. Last colonoscopy 3/2022 by Dr. Kandice Levin with ulcerative proctitis. 5/23/2022: Reports ongoing diarrhea, unchanged by vancomycin. No rectal bleeding. Mild lower abdominal pain that is worse in the right lower quadrant on exam.  No nausea or vomiting. Continues to run a fever, T-max 100.4.    5/24/2022: Vancomycin increased to 500 mg yesterday. Diarrhea seems less. Had diarrhea first thing this morning and reports rectal bleeding that she says has been going on for a year although yesterday she said she was not having rectal bleeding. Ulcerative proctitis diagnosed 3/2022. She says the [facility]. suppositories have never helped and she stopped taking them at home. Abdominal pain is less but persists to some degree. Some nausea this morning. For cystoscopy with ureteral stent placement today. 5/25/2022: Stool more pasty, less watery. Not as much bleeding. Refused suppository last night because she thought it was the Anusol she has been taking although we discussed started mesalamine suppository yesterday. Less pain but more bloating. No nausea or vomiting. Tolerating diet. ). Plan:   (- Continue vancomycin. Dr. Lolis Madrid discussed possible fecal transplant which she states she was told may be able to be done on Friday but doubtful this could be arranged for this week as there is some work up front to be done prior to these procedures in terms of procuring the fecal sample. Plan to try Dificid before moving forward with FMT.  Seems to be improving with higher dose vancomycin.   - Continue Canasa suppository, she is agreeable to taking it tonight after refusing it last night.  ). Electronically signed by Pedro Wayne NP on 5/25/2022 at 3:58 PM      Continuing to monitor progress, if we  treatment failure with vancomycin next option in my opinion would be trial of fidaxomicin.   Brown Bell MD

## 2022-05-25 NOTE — PROGRESS NOTES
Problem: Risk for Spread of Infection  Goal: Prevent transmission of infectious organism to others  Description: Prevent the transmission of infectious organisms to other patients, staff members, and visitors. Outcome: Progressing Towards Goal     Problem: Falls - Risk of  Goal: *Absence of Falls  Description: Document Deysi Kwok Fall Risk and appropriate interventions in the flowsheet. Outcome: Progressing Towards Goal  Note: Fall Risk Interventions:            Medication Interventions: Evaluate medications/consider consulting pharmacy,Patient to call before getting OOB,Teach patient to arise slowly    Elimination Interventions: Call light in reach              Problem: Pain  Goal: *Control of Pain  Outcome: Progressing Towards Goal  Goal: *PALLIATIVE CARE:  Alleviation of Pain  Outcome: Progressing Towards Goal     Problem: Activity Intolerance  Goal: *Oxygen saturation during activity within specified parameters  Outcome: Progressing Towards Goal  Goal: *Able to remain out of bed as prescribed  Outcome: Progressing Towards Goal     Problem:  Activity Intolerance  Goal: *Able to remain out of bed as prescribed  Outcome: Progressing Towards Goal

## 2022-05-25 NOTE — PROGRESS NOTES
Shaan Egan [facility]  [address]  [phone]      Medical Progress Note      NAME: Mukul Bowman   :  1955  MRM:  [id number]    Date/Time of service: 2022  12:44 PM       Subjective:     Chief Complaint:  Patient was personally seen and examined by me during this time period. Chart reviewed. Still having diarrhea about 4 times per hour. No abd pain       Objective:       Vitals:       Last 24hrs VS reviewed since prior progress note.  Most recent are:    Visit Vitals  /68 (BP 1 Location: Right upper arm, BP Patient Position: At rest)   Pulse 97   Temp 99.2 °F (37.3 °C)   Resp 16   Ht 5' 1\" (1.549 m)   Wt 60.4 kg (133 lb 2.5 oz)   SpO2 95%   BMI 25.16 kg/m²     SpO2 Readings from Last 6 Encounters:   22 95%   22 95%   21 95%   10/18/17 100%    O2 Flow Rate (L/min): 2 l/min       Intake/Output Summary (Last 24 hours) at 2022 1244  Last data filed at 2022 0210  Gross per 24 hour   Intake 2107.5 ml   Output 2 ml   Net 2105.5 ml        Exam:     Physical Exam:    Gen:  Elderly, ill-appearing, NAD  HEENT:  Pink conjunctivae, PERRL, hearing intact to voice, moist mucous membranes  Neck:  Supple, without masses, thyroid non-tender  Resp:  No accessory muscle use, clear breath sounds without wheezes rales or rhonchi  Card:  No murmurs, normal S1, S2 without thrills, bruits or peripheral edema  Abd:  Soft, non-tender, non-distended, normoactive bowel sounds are present  Musc:  No cyanosis or clubbing  Skin:  No rashes  Neuro:  Cranial nerves 3-12 are grossly intact, follows commands appropriately  Psych:  poor insight, oriented to person, place and time, alert    Medications Reviewed: (see below)    Lab Data Reviewed: (see below)    ______________________________________________________________________    Medications:     Current Facility-Administered Medications   Medication Dose Route Frequency    fidaxomicin (DIFICID) tablet 200 mg  200 mg Oral BID    oxyCODONE IR (ROXICODONE) tablet 5 mg  5 mg Oral Q4H PRN    lactated Ringers infusion  75 mL/hr IntraVENous CONTINUOUS    mesalamine (CANASA) suppository 1,000 mg  1 Suppository Rectal QHS    psyllium husk-aspartame (METAMUCIL FIBER) packet 1 Packet  1 Packet Oral BID    HYDROmorphone (DILAUDID) injection 1 mg  1 mg IntraVENous Q4H PRN    vancomycin (FIRVANQ) 50 mg/mL oral solution 500 mg  500 mg Oral Q6H    L.acidophilus-paracasei-S.thermophil-bifidobacter (RISAQUAD) 8 billion cell capsule  1 Capsule Oral DAILY    prochlorperazine (COMPAZINE) injection 5 mg  5 mg IntraVENous Q6H PRN    acetaminophen (TYLENOL) tablet 650 mg  650 mg Oral Q6H PRN    polyethylene glycol (MIRALAX) packet 17 g  17 g Oral DAILY PRN    ondansetron (ZOFRAN) injection 4 mg  4 mg IntraVENous Q6H PRN    enoxaparin (LOVENOX) injection 40 mg  40 mg SubCUTAneous DAILY          Lab Review:     Recent Labs     05/25/22  0302 05/24/22  0023 05/23/22  0553   WBC 11.8* 12.0* 9.6   HGB 12.2 12.6 13.2   HCT 37.5 38.5 40.8    233 250     Recent Labs     05/25/22  0302 05/24/22  0023 05/23/22  1012    141 144   K 4.4 4.1 3.6   * 114* 114*   CO2 23 21 24   * 119* 103*   BUN 12 6 7   CREA 0.87 0.86 0.83   CA 8.2* 8.4* 8.2*   MG 2.0 1.4* 1.4*   PHOS 2.8 1.9* 2.0*   ALB  --  2.0* 2.1*   TBILI  --  0.2 0.4   ALT  --  10* 10*     Lab Results   Component Value Date/Time    Glucose (POC) 93 10/16/2017 09:19 AM    Glucose (POC) 116 (H) 10/15/2017 02:12 PM    Glucose (POC) 141 (H) 10/14/2017 01:14 PM          Assessment / Plan:     76 yo hx of ulcerative colitis, presented w/ sepsis, C-diff colitis, L hydronephrosis w/ obstructing stone, SPENCER    1) C-diff colitis: sepsis POA, now improving. Still with significant diarrhea. Cont PO Vanc, pro-biotics. Add Dificid.   GI following, plan for fecal transplant if not better     2) L hydronephrosis from an obstructing stone: s/p cysto and L ureteral stent by Urology on 03/74    3) Complicated UTI due to obstructing stone: urine Cx neg. Will hold on systemic IV abx due to C-diff    4) UC: cont mesalamine.   Management per GI    5) HypoMg/phos: repleted IV, monitor     Total time spent with patient care: 35 Minutes **I personally saw and examined the patient during this time period**                 Care Plan discussed with: Patient, nursing     Discussed:  Care Plan    Prophylaxis:  Lovenox    Disposition:  SNF/LTC           ___________________________________________________    Attending Physician: Josh Lu MD

## 2022-05-26 LAB
ANION GAP SERPL CALC-SCNC: 7 MMOL/L (ref 5–15)
BUN SERPL-MCNC: 12 MG/DL (ref 6–20)
BUN/CREAT SERPL: 11 (ref 12–20)
CALCIUM SERPL-MCNC: 8.7 MG/DL (ref 8.5–10.1)
CHLORIDE SERPL-SCNC: 110 MMOL/L (ref 97–108)
CO2 SERPL-SCNC: 26 MMOL/L (ref 21–32)
CREAT SERPL-MCNC: 1.08 MG/DL (ref 0.55–1.02)
ERYTHROCYTE [DISTWIDTH] IN BLOOD BY AUTOMATED COUNT: 13.9 % (ref 11.5–14.5)
GLUCOSE SERPL-MCNC: 95 MG/DL (ref 65–100)
HCT VFR BLD AUTO: 39.3 % (ref 35–47)
HGB BLD-MCNC: 12.3 G/DL (ref 11.5–16)
MAGNESIUM SERPL-MCNC: 1.9 MG/DL (ref 1.6–2.4)
MCH RBC QN AUTO: 30.3 PG (ref 26–34)
MCHC RBC AUTO-ENTMCNC: 31.3 G/DL (ref 30–36.5)
MCV RBC AUTO: 96.8 FL (ref 80–99)
NRBC # BLD: 0 K/UL (ref 0–0.01)
NRBC BLD-RTO: 0 PER 100 WBC
PHOSPHATE SERPL-MCNC: 3.6 MG/DL (ref 2.6–4.7)
PLATELET # BLD AUTO: 270 K/UL (ref 150–400)
PMV BLD AUTO: 10.7 FL (ref 8.9–12.9)
POTASSIUM SERPL-SCNC: 3.9 MMOL/L (ref 3.5–5.1)
RBC # BLD AUTO: 4.06 M/UL (ref 3.8–5.2)
SODIUM SERPL-SCNC: 143 MMOL/L (ref 136–145)
WBC # BLD AUTO: 10.3 K/UL (ref 3.6–11)

## 2022-05-26 PROCEDURE — 74011250637 HC RX REV CODE- 250/637: Performed by: UROLOGY

## 2022-05-26 PROCEDURE — 74011250636 HC RX REV CODE- 250/636: Performed by: INTERNAL MEDICINE

## 2022-05-26 PROCEDURE — 80048 BASIC METABOLIC PNL TOTAL CA: CPT

## 2022-05-26 PROCEDURE — 65270000029 HC RM PRIVATE

## 2022-05-26 PROCEDURE — 74011250636 HC RX REV CODE- 250/636: Performed by: UROLOGY

## 2022-05-26 PROCEDURE — 36415 COLL VENOUS BLD VENIPUNCTURE: CPT

## 2022-05-26 PROCEDURE — 74011250637 HC RX REV CODE- 250/637: Performed by: NURSE PRACTITIONER

## 2022-05-26 PROCEDURE — 84100 ASSAY OF PHOSPHORUS: CPT

## 2022-05-26 PROCEDURE — 85027 COMPLETE CBC AUTOMATED: CPT

## 2022-05-26 PROCEDURE — 83735 ASSAY OF MAGNESIUM: CPT

## 2022-05-26 RX ADMIN — FIDAXOMICIN 200 MG: 200 TABLET, FILM COATED ORAL at 16:47

## 2022-05-26 RX ADMIN — HYDROMORPHONE HYDROCHLORIDE 1 MG: 1 INJECTION, SOLUTION INTRAMUSCULAR; INTRAVENOUS; SUBCUTANEOUS at 13:11

## 2022-05-26 RX ADMIN — VANCOMYCIN HYDROCHLORIDE 500 MG: 250 POWDER, FOR SOLUTION ORAL at 10:19

## 2022-05-26 RX ADMIN — Medication 1 CAPSULE: at 08:57

## 2022-05-26 RX ADMIN — HYDROMORPHONE HYDROCHLORIDE 1 MG: 1 INJECTION, SOLUTION INTRAMUSCULAR; INTRAVENOUS; SUBCUTANEOUS at 01:20

## 2022-05-26 RX ADMIN — VANCOMYCIN HYDROCHLORIDE 500 MG: 250 POWDER, FOR SOLUTION ORAL at 04:37

## 2022-05-26 RX ADMIN — ONDANSETRON 4 MG: 2 INJECTION INTRAMUSCULAR; INTRAVENOUS at 06:31

## 2022-05-26 RX ADMIN — ONDANSETRON 4 MG: 2 INJECTION INTRAMUSCULAR; INTRAVENOUS at 21:22

## 2022-05-26 RX ADMIN — ONDANSETRON 4 MG: 2 INJECTION INTRAMUSCULAR; INTRAVENOUS at 13:18

## 2022-05-26 RX ADMIN — HYDROMORPHONE HYDROCHLORIDE 1 MG: 1 INJECTION, SOLUTION INTRAMUSCULAR; INTRAVENOUS; SUBCUTANEOUS at 18:45

## 2022-05-26 RX ADMIN — HYDROMORPHONE HYDROCHLORIDE 1 MG: 1 INJECTION, SOLUTION INTRAMUSCULAR; INTRAVENOUS; SUBCUTANEOUS at 06:31

## 2022-05-26 RX ADMIN — ENOXAPARIN SODIUM 40 MG: 100 INJECTION SUBCUTANEOUS at 08:57

## 2022-05-26 RX ADMIN — OXYCODONE 5 MG: 5 TABLET ORAL at 21:22

## 2022-05-26 NOTE — PROGRESS NOTES
Urology Progress Note    Subjective:     Daily Progress Note: 2022 8:37 AM    Lucy Anguiano is 2 Days Post-Op and doing good. She reports pain is absent. She has no new complaints. She is tolerating a solid diet. Patient is voiding without difficulty. She reports hematuria sp ureteral stent placement and that \"its getting better, more like a pink color now\". Denies dysuria or flank discomfort. Remains IP for tx of CDiff     Objective:     Visit Vitals  /70   Pulse 95   Temp 98.9 °F (37.2 °C)   Resp 16   Ht 5' 1\" (1.549 m)   Wt 60.4 kg (133 lb 2.5 oz)   SpO2 94%   BMI 25.16 kg/m²        Temp (24hrs), Av.7 °F (37.1 °C), Min:98.2 °F (36.8 °C), Max:99.2 °F (37.3 °C)      Intake and Output:   1901 -  0700  In: 3907.5 [P.O.:3040; I.V.:867.5]  Out: -   No intake/output data recorded.     Physical Exam:   General appearance: alert, cooperative, no distress, appears stated age  Respiratory: no distress, room air   Abdomen: not distended, soft, no tenderness  : no CVA tenderness, voiding independently   Extremities: moves all     Data Review:    Recent Results (from the past 24 hour(s))   CBC W/O DIFF    Collection Time: 22  1:05 AM   Result Value Ref Range    WBC 10.3 3.6 - 11.0 K/uL    RBC 4.06 3.80 - 5.20 M/uL    HGB 12.3 11.5 - 16.0 g/dL    HCT 39.3 35.0 - 47.0 %    MCV 96.8 80.0 - 99.0 FL    MCH 30.3 26.0 - 34.0 PG    MCHC 31.3 30.0 - 36.5 g/dL    RDW 13.9 11.5 - 14.5 %    PLATELET 619 602 - 088 K/uL    MPV 10.7 8.9 - 12.9 FL    NRBC 0.0 0  WBC    ABSOLUTE NRBC 0.00 0.00 - 8.90 K/uL   METABOLIC PANEL, BASIC    Collection Time: 22  1:05 AM   Result Value Ref Range    Sodium 143 136 - 145 mmol/L    Potassium 3.9 3.5 - 5.1 mmol/L    Chloride 110 (H) 97 - 108 mmol/L    CO2 26 21 - 32 mmol/L    Anion gap 7 5 - 15 mmol/L    Glucose 95 65 - 100 mg/dL    BUN 12 6 - 20 MG/DL    Creatinine 1.08 (H) 0.55 - 1.02 MG/DL    BUN/Creatinine ratio 11 (L) 12 - 20      GFR est AA >60 >60 ml/min/1.73m2    GFR est non-AA 50 (L) >60 ml/min/1.73m2    Calcium 8.7 8.5 - 10.1 MG/DL   PHOSPHORUS    Collection Time: 05/26/22  1:05 AM   Result Value Ref Range    Phosphorus 3.6 2.6 - 4.7 MG/DL   MAGNESIUM    Collection Time: 05/26/22  1:05 AM   Result Value Ref Range    Magnesium 1.9 1.6 - 2.4 mg/dL       Assessment/Plan:     Principal Problem:    Clostridium difficile diarrhea (5/22/2022)    Active Problems:    Nephrolithiasis (5/26/2021)      Hydronephrosis (5/26/2021)      Flank pain (5/26/2021)      Renal insufficiency (5/26/2021)      Bandemia (5/21/2022)      Hypokalemia (5/21/2022)      Dehydration (5/21/2022)      Colitis (5/21/2022)        Status Post:  Procedure(s):  CYSTOSCOPY WITH left ureteral RETROGRADES LEFT STENT INSERTION     Plan:   -No further  interventions planned at this time  -OP f/u has been arranged to discuss definitive stone treatment     Please call with questions or concerns     Signed By: Marleni Ugarte NP                         May 26, 2022

## 2022-05-26 NOTE — PROGRESS NOTES
Bedside and Verbal shift change report given to Marlys Ca (oncoming nurse) by Ashley Alexander (offgoing nurse). Report included the following information SBAR, Kardex, Procedure Summary, Intake/Output, MAR and Recent Results.

## 2022-05-26 NOTE — PROGRESS NOTES
Progress Note  Date:2022       Room:[facility]  Patient Name:Betsy Carmona     YOB: 1955     Age:67 y.o. Subjective    Subjective:  Symptoms:  She reports diarrhea. Diet:  Poor intake. She reports  nausea. No vomiting. Pain:  She complains of pain that is mild. Review of Systems   Constitutional: Positive for fatigue and fever. Negative for appetite change. Gastrointestinal: Positive for abdominal pain, blood in stool, diarrhea and nausea. Negative for vomiting. Skin: Negative for pallor. Objective         Vitals Last 24 Hours:  TEMPERATURE:  Temp  Av.5 °F (36.9 °C)  Min: 97.6 °F (36.4 °C)  Max: 99.1 °F (37.3 °C)  RESPIRATIONS RANGE: Resp  Av.6  Min: 16  Max: 18  PULSE OXIMETRY RANGE: SpO2  Av %  Min: 92 %  Max: 95 %  PULSE RANGE: Pulse  Av.2  Min: 94  Max: 100  BLOOD PRESSURE RANGE: Systolic (48UNX), XVU:620 , Min:95 , VPP:610   ; Diastolic (03ZMM), LTO:19, Min:62, Max:73    I/O (24Hr): Intake/Output Summary (Last 24 hours) at 2022 1454  Last data filed at 2022 0502  Gross per 24 hour   Intake 2640 ml   Output --   Net 2640 ml     Objective:  General Appearance:  Uncomfortable. Vital signs: (most recent): Blood pressure 116/72, pulse 94, temperature 99.1 °F (37.3 °C), resp. rate 16, height 5' 1\" (1.549 m), weight 60.4 kg (133 lb 2.5 oz), SpO2 94 %. Fever. Output: Producing stool. HEENT: Normal HEENT exam.    Lungs:  Normal effort and normal respiratory rate. Breath sounds clear to auscultation. Heart: Normal rate. Regular rhythm. S1 normal and S2 normal.  No murmur. Abdomen: Abdomen is soft and non-distended. Bowel sounds are normal.   There is generalized tenderness. Extremities: Normal range of motion. There is no dependent edema. Pulses: Distal pulses are intact. Neurological: Patient is alert and oriented to person, place and time.     Pupils:  Pupils are equal, round, and reactive to light. Skin:  Warm and dry. Labs/Imaging/Diagnostics    Labs:  CBC:  Recent Labs     05/26/22  0105 05/25/22  0302 05/24/22  0023   WBC 10.3 11.8* 12.0*   RBC 4.06 4.01 4.09   HGB 12.3 12.2 12.6   HCT 39.3 37.5 38.5   MCV 96.8 93.5 94.1   RDW 13.9 13.7 13.7    256 233     CHEMISTRIES:  Recent Labs     05/26/22  0105 05/25/22  0302 05/24/22  0023    141 141   K 3.9 4.4 4.1   * 110* 114*   CO2 26 23 21   BUN 12 12 6   CA 8.7 8.2* 8.4*   PHOS 3.6 2.8 1.9*   MG 1.9 2.0 1.4*   PT/INR:No results for input(s): INR, INREXT, INREXT in the last 72 hours. No lab exists for component: PROTIME  APTT:No results for input(s): APTT in the last 72 hours. LIVER PROFILE:  Recent Labs     05/24/22  0023   AST 10*   ALT 10*     Lab Results   Component Value Date/Time    ALT (SGPT) 10 (L) 05/24/2022 12:23 AM    AST (SGOT) 10 (L) 05/24/2022 12:23 AM    Alk. phosphatase 55 05/24/2022 12:23 AM    Bilirubin, total 0.2 05/24/2022 12:23 AM       Imaging Last 24 Hours:  No results found. Assessment//Plan   Principal Problem:    Clostridium difficile diarrhea (5/22/2022)    Active Problems:    Nephrolithiasis (5/26/2021)      Hydronephrosis (5/26/2021)      Flank pain (5/26/2021)      Renal insufficiency (5/26/2021)      Bandemia (5/21/2022)      Hypokalemia (5/21/2022)      Dehydration (5/21/2022)      Colitis (5/21/2022)      Assessment:  (69-year-old female who in March was diagnosed with ulcerative proctitis and follows with my partner Dr. Partha Mchugh.  She was initially placed on prednisone taper and mesalamine suppositories. More recently she notes worsening GI symptoms which include abdominal pain, nausea, and diarrhea. She tells me she has had 15 bowel movements already today, most of which are watery. Blood noted as well. She is not anemic though inflammatory markers are elevated. Stool positive for C. Difficile, she has been started on oral vancomycin and IV Flagyl.   CT scan shows diffuse thickening up to the rectosigmoid junction as well as some thickening within the transverse colon. Last colonoscopy 3/2022 by Dr. Chris Villalobos with ulcerative proctitis. 5/23/2022: Reports ongoing diarrhea, unchanged by vancomycin. No rectal bleeding. Mild lower abdominal pain that is worse in the right lower quadrant on exam.  No nausea or vomiting. Continues to run a fever, T-max 100.4.    5/24/2022: Vancomycin increased to 500 mg yesterday. Diarrhea seems less. Had diarrhea first thing this morning and reports rectal bleeding that she says has been going on for a year although yesterday she said she was not having rectal bleeding. Ulcerative proctitis diagnosed 3/2022. She says the [facility]. suppositories have never helped and she stopped taking them at home. Abdominal pain is less but persists to some degree. Some nausea this morning. For cystoscopy with ureteral stent placement today. 5/25/2022: Stool more pasty, less watery. Not as much bleeding. Refused suppository last night because she thought it was the Anusol she has been taking although we discussed started mesalamine suppository yesterday. Less pain but more bloating. No nausea or vomiting. Tolerating diet. 5/26/2022: Was having some improvement with her diarrhea but reports multiple episodes of diarrhea this morning and that she has had a \"bad morning\". Had some mucus with a small amount of blood mixed in with her stool. She is concerned about fluid on her abdomen which she says has been there for months. Review of the CT does not show any fluid in her abdomen so I suspect this is ongoing issues with bloating. She has had some nausea but no vomiting. Has not been able to eat today. Tmax 99.2. ). Plan:   (- While she seemed to be initially improving with the higher dose of vancomycin today she reports again multiple episodes of diarrhea. She had been taking vancomycin for 4 days so we will discontinue that and start Dificid.   She appears uncomfortable and has low-grade fever but does not have worsening abdominal pain. Could consider repeating CT scan if her pain were to worsen or her fever persists. - Continue Canasa suppository at HS ). Electronically signed by Luca Hurt NP on 5/26/2022 at 3:58 PM    Apparent treatment failure with vancomycin, will attempt fidaxomicin.   Constantine Covarrubias MD

## 2022-05-26 NOTE — PROGRESS NOTES
[facility]ign[facility] [address] [place] [address]  [phone]      Medical Progress Note      NAME: Evelyn Samuel   :  1955  MRM:  [id number]    Date/Time of service: 2022  10:51 AM       Subjective:     Chief Complaint:  Patient was personally seen and examined by me during this time period. Chart reviewed. Had diarrhea x15 times since 6am.  Stated that stool is less watery        Objective:       Vitals:       Last 24hrs VS reviewed since prior progress note.  Most recent are:    Visit Vitals  BP 95/62 (BP 1 Location: Left upper arm, BP Patient Position: At rest)   Pulse 95   Temp 97.6 °F (36.4 °C)   Resp 18   Ht 5' 1\" (1.549 m)   Wt 60.4 kg (133 lb 2.5 oz)   SpO2 95%   BMI 25.16 kg/m²     SpO2 Readings from Last 6 Encounters:   22 95%   22 95%   21 95%   10/18/17 100%    O2 Flow Rate (L/min): 2 l/min       Intake/Output Summary (Last 24 hours) at 2022 1051  Last data filed at 2022 0502  Gross per 24 hour   Intake 2640 ml   Output    Net 2640 ml        Exam:     Physical Exam:    Gen:  Elderly, ill-appearing, NAD  HEENT:  Pink conjunctivae, PERRL, hearing intact to voice, moist mucous membranes  Neck:  Supple, without masses, thyroid non-tender  Resp:  No accessory muscle use, clear breath sounds without wheezes rales or rhonchi  Card:  No murmurs, normal S1, S2 without thrills, bruits or peripheral edema  Abd:  Soft, non-tender, non-distended, normoactive bowel sounds are present  Musc:  No cyanosis or clubbing  Skin:  No rashes  Neuro:  Cranial nerves 3-12 are grossly intact, follows commands appropriately  Psych:  poor insight, oriented to person, place and time, alert    Medications Reviewed: (see below)    Lab Data Reviewed: (see below)    ______________________________________________________________________    Medications:     Current Facility-Administered Medications   Medication Dose Route Frequency    methen-m.blue-s.phos-phsal-hyo (URELLE) 81-10.8-40.8 mg tablet tab 1 Tablet  1 Tablet Oral QID PRN    pantoprazole (PROTONIX) tablet 40 mg  40 mg Oral DAILY    oxyCODONE IR (ROXICODONE) tablet 5 mg  5 mg Oral Q4H PRN    lactated Ringers infusion  75 mL/hr IntraVENous CONTINUOUS    mesalamine (CANASA) suppository 1,000 mg  1 Suppository Rectal QHS    psyllium husk-aspartame (METAMUCIL FIBER) packet 1 Packet  1 Packet Oral BID    HYDROmorphone (DILAUDID) injection 1 mg  1 mg IntraVENous Q4H PRN    vancomycin (FIRVANQ) 50 mg/mL oral solution 500 mg  500 mg Oral Q6H    L.acidophilus-paracasei-S.thermophil-bifidobacter (RISAQUAD) 8 billion cell capsule  1 Capsule Oral DAILY    prochlorperazine (COMPAZINE) injection 5 mg  5 mg IntraVENous Q6H PRN    acetaminophen (TYLENOL) tablet 650 mg  650 mg Oral Q6H PRN    polyethylene glycol (MIRALAX) packet 17 g  17 g Oral DAILY PRN    ondansetron (ZOFRAN) injection 4 mg  4 mg IntraVENous Q6H PRN    enoxaparin (LOVENOX) injection 40 mg  40 mg SubCUTAneous DAILY          Lab Review:     Recent Labs     05/26/22  0105 05/25/22  0302 05/24/22  0023   WBC 10.3 11.8* 12.0*   HGB 12.3 12.2 12.6   HCT 39.3 37.5 38.5    256 233     Recent Labs     05/26/22  0105 05/25/22  0302 05/24/22  0023    141 141   K 3.9 4.4 4.1   * 110* 114*   CO2 26 23 21   GLU 95 136* 119*   BUN 12 12 6   CREA 1.08* 0.87 0.86   CA 8.7 8.2* 8.4*   MG 1.9 2.0 1.4*   PHOS 3.6 2.8 1.9*   ALB  --   --  2.0*   TBILI  --   --  0.2   ALT  --   --  10*     Lab Results   Component Value Date/Time    Glucose (POC) 93 10/16/2017 09:19 AM    Glucose (POC) 116 (H) 10/15/2017 02:12 PM    Glucose (POC) 141 (H) 10/14/2017 01:14 PM          Assessment / Plan:     76 yo hx of ulcerative colitis, presented w/ sepsis, C-diff colitis, L hydronephrosis w/ obstructing stone, SPENCER    1) C-diff colitis: sepsis POA, now resolved. Still with significant diarrhea, but slowly improving.   Cont PO Vanc, pro-biotics. Defer to GI regarding Dificid and fecal transplant    2) L hydronephrosis from an obstructing stone: s/p cysto and L ureteral stent by Urology on 05/24. Will f/u with [facility] for definitive stone treatments     3) Complicated UTI due to obstructing stone: urine Cx neg. Will hold on systemic IV abx due to C-diff    4) UC: cont mesalamine.   Management per GI    5) HypoMg/phos: repleted IV, monitor prn    Total time spent with patient care: 30 Minutes **I personally saw and examined the patient during this time period**                 Care Plan discussed with: Patient, nursing     Discussed:  Care Plan    Prophylaxis:  Lovenox    Disposition:  SNF/LTC           ___________________________________________________    Attending Physician: Josh Lu MD

## 2022-05-26 NOTE — PROGRESS NOTES
Bedside and Verbal shift change report given to [address] [address] [facility] [address] Katie MOSES RN (oncoming nurse) by Gianna Grey (offgoing nurse). Report included the following information SBAR, Intake/Output, MAR, Recent Results and Med Rec Status. Mom instructed to call back end of April for May appt scheduling

## 2022-05-26 NOTE — PROGRESS NOTES
Nutrition Education    · Educated on [place] supplement for diarrhea management  RD explained timing of Banatrol, ideas for adding it to yogurt, applesauce or similar soft foods. · Learners: Patient and and RN  · Readiness: Ranjith Pitts  · Method: Explanation and Demonstration  · Response: Verbalizes Understanding  · Contact name and number provided.     Latoya Dhaliwal MS, RD  Contact via [place] or office [phone]

## 2022-05-26 NOTE — PROGRESS NOTES
OZIEL:   1) Home with family and follow up appointments   2) Pt's family will drive pt home at time of discharge and as needed  3) Risk of readmission- 9% [place] [address] Day 5:   1:50 PM- Pt remains on Med. Surg- pt continues to have C-Diff. Anticipated d/c greater than 48 hours- CM will continue to follow and assist as needed.      Paola Llanos, MSW, [phone]

## 2022-05-27 PROCEDURE — 74011250637 HC RX REV CODE- 250/637: Performed by: NURSE PRACTITIONER

## 2022-05-27 PROCEDURE — 74011250636 HC RX REV CODE- 250/636: Performed by: UROLOGY

## 2022-05-27 PROCEDURE — 74011250637 HC RX REV CODE- 250/637: Performed by: INTERNAL MEDICINE

## 2022-05-27 PROCEDURE — 74011250637 HC RX REV CODE- 250/637: Performed by: UROLOGY

## 2022-05-27 PROCEDURE — 65270000029 HC RM PRIVATE

## 2022-05-27 PROCEDURE — 74011250636 HC RX REV CODE- 250/636: Performed by: INTERNAL MEDICINE

## 2022-05-27 RX ORDER — ALPRAZOLAM 0.5 MG/1
0.5 TABLET ORAL
Status: DISCONTINUED | OUTPATIENT
Start: 2022-05-27 | End: 2022-05-28

## 2022-05-27 RX ADMIN — SODIUM CHLORIDE, POTASSIUM CHLORIDE, SODIUM LACTATE AND CALCIUM CHLORIDE 75 ML/HR: 600; 310; 30; 20 INJECTION, SOLUTION INTRAVENOUS at 17:05

## 2022-05-27 RX ADMIN — HYDROMORPHONE HYDROCHLORIDE 1 MG: 1 INJECTION, SOLUTION INTRAMUSCULAR; INTRAVENOUS; SUBCUTANEOUS at 12:56

## 2022-05-27 RX ADMIN — MESALAMINE 1000 MG: 1000 SUPPOSITORY RECTAL at 00:22

## 2022-05-27 RX ADMIN — Medication 1 CAPSULE: at 08:50

## 2022-05-27 RX ADMIN — HYDROMORPHONE HYDROCHLORIDE 1 MG: 1 INJECTION, SOLUTION INTRAMUSCULAR; INTRAVENOUS; SUBCUTANEOUS at 07:43

## 2022-05-27 RX ADMIN — HYDROMORPHONE HYDROCHLORIDE 1 MG: 1 INJECTION, SOLUTION INTRAMUSCULAR; INTRAVENOUS; SUBCUTANEOUS at 22:54

## 2022-05-27 RX ADMIN — ONDANSETRON 4 MG: 2 INJECTION INTRAMUSCULAR; INTRAVENOUS at 20:47

## 2022-05-27 RX ADMIN — ALPRAZOLAM 0.5 MG: 0.5 TABLET ORAL at 22:53

## 2022-05-27 RX ADMIN — PANTOPRAZOLE SODIUM 40 MG: 40 TABLET, DELAYED RELEASE ORAL at 08:50

## 2022-05-27 RX ADMIN — OXYCODONE 5 MG: 5 TABLET ORAL at 20:47

## 2022-05-27 RX ADMIN — SODIUM CHLORIDE, POTASSIUM CHLORIDE, SODIUM LACTATE AND CALCIUM CHLORIDE 75 ML/HR: 600; 310; 30; 20 INJECTION, SOLUTION INTRAVENOUS at 03:33

## 2022-05-27 RX ADMIN — ENOXAPARIN SODIUM 40 MG: 100 INJECTION SUBCUTANEOUS at 08:50

## 2022-05-27 RX ADMIN — ONDANSETRON 4 MG: 2 INJECTION INTRAMUSCULAR; INTRAVENOUS at 07:43

## 2022-05-27 RX ADMIN — OXYCODONE 5 MG: 5 TABLET ORAL at 03:43

## 2022-05-27 RX ADMIN — FIDAXOMICIN 200 MG: 200 TABLET, FILM COATED ORAL at 17:19

## 2022-05-27 RX ADMIN — HYDROMORPHONE HYDROCHLORIDE 1 MG: 1 INJECTION, SOLUTION INTRAMUSCULAR; INTRAVENOUS; SUBCUTANEOUS at 00:59

## 2022-05-27 RX ADMIN — MESALAMINE 1000 MG: 1000 SUPPOSITORY RECTAL at 22:53

## 2022-05-27 RX ADMIN — FIDAXOMICIN 200 MG: 200 TABLET, FILM COATED ORAL at 08:59

## 2022-05-27 RX ADMIN — HYDROMORPHONE HYDROCHLORIDE 1 MG: 1 INJECTION, SOLUTION INTRAMUSCULAR; INTRAVENOUS; SUBCUTANEOUS at 17:05

## 2022-05-27 NOTE — PROGRESS NOTES
Progress Note  Date:2022       Room:Replaced by [facility]  Patient Name:Betsy Jones     YOB: 1955     Age:67 y.o. Subjective    Subjective:  Symptoms:  She reports diarrhea. Diet:  Adequate intake. No nausea or vomiting. Pain:  She complains of pain that is mild. Review of Systems   Constitutional: Positive for fatigue and fever. Negative for appetite change. Gastrointestinal: Positive for abdominal pain and diarrhea. Negative for blood in stool, nausea and vomiting. Skin: Negative for pallor. Objective         Vitals Last 24 Hours:  TEMPERATURE:  Temp  Av.9 °F (37.2 °C)  Min: 98.8 °F (37.1 °C)  Max: 99.1 °F (37.3 °C)  RESPIRATIONS RANGE: Resp  Av.4  Min: 16  Max: 18  PULSE OXIMETRY RANGE: SpO2  Av.8 %  Min: 92 %  Max: 95 %  PULSE RANGE: Pulse  Av.6  Min: 80  Max: 100  BLOOD PRESSURE RANGE: Systolic (54CHG), VRX:759 , Min:102 , LHT:587   ; Diastolic (57XLK), INL:13, Min:66, Max:76    I/O (24Hr): Intake/Output Summary (Last 24 hours) at 2022 0855  Last data filed at 2022 0606  Gross per 24 hour   Intake 3182 ml   Output --   Net 3182 ml     Objective:  General Appearance:  Comfortable. Vital signs: (most recent): Blood pressure 103/66, pulse 80, temperature 98.9 °F (37.2 °C), resp. rate 18, height 5' 1\" (1.549 m), weight 60.4 kg (133 lb 2.5 oz), SpO2 93 %. Fever. Output: Producing stool. HEENT: Normal HEENT exam.    Lungs:  Normal effort and normal respiratory rate. Breath sounds clear to auscultation. Heart: Normal rate. Regular rhythm. S1 normal and S2 normal.  No murmur. Abdomen: Abdomen is soft and non-distended. Bowel sounds are normal.   There is generalized tenderness. Extremities: Normal range of motion. There is no dependent edema. Pulses: Distal pulses are intact. Neurological: Patient is alert and oriented to person, place and time.     Pupils:  Pupils are equal, round, and reactive to light. Skin:  Warm and dry. Labs/Imaging/Diagnostics    Labs:  CBC:  Recent Labs     05/26/22  0105 05/25/22  0302   WBC 10.3 11.8*   RBC 4.06 4.01   HGB 12.3 12.2   HCT 39.3 37.5   MCV 96.8 93.5   RDW 13.9 13.7    256     CHEMISTRIES:  Recent Labs     05/26/22  0105 05/25/22  0302    141   K 3.9 4.4   * 110*   CO2 26 23   BUN 12 12   CA 8.7 8.2*   PHOS 3.6 2.8   MG 1.9 2.0   PT/INR:No results for input(s): INR, INREXT, INREXT in the last 72 hours. No lab exists for component: PROTIME  APTT:No results for input(s): APTT in the last 72 hours. LIVER PROFILE:  No results for input(s): AST, ALT in the last 72 hours. No lab exists for component: BILIDIR, BILITOT, ALKPHOS  Lab Results   Component Value Date/Time    ALT (SGPT) 10 (L) 05/24/2022 12:23 AM    AST (SGOT) 10 (L) 05/24/2022 12:23 AM    Alk. phosphatase 55 05/24/2022 12:23 AM    Bilirubin, total 0.2 05/24/2022 12:23 AM       Imaging Last 24 Hours:  No results found. Assessment//Plan   Principal Problem:    Clostridium difficile diarrhea (5/22/2022)    Active Problems:    Nephrolithiasis (5/26/2021)      Hydronephrosis (5/26/2021)      Flank pain (5/26/2021)      Renal insufficiency (5/26/2021)      Bandemia (5/21/2022)      Hypokalemia (5/21/2022)      Dehydration (5/21/2022)      Colitis (5/21/2022)      Assessment:  (70-year-old female who in March was diagnosed with ulcerative proctitis and follows with my partner Dr. Olga White.  She was initially placed on prednisone taper and mesalamine suppositories. More recently she notes worsening GI symptoms which include abdominal pain, nausea, and diarrhea. She tells me she has had 15 bowel movements already today, most of which are watery. Blood noted as well. She is not anemic though inflammatory markers are elevated. Stool positive for C. Difficile, she has been started on oral vancomycin and IV Flagyl.   CT scan shows diffuse thickening up to the rectosigmoid junction as well as some thickening within the transverse colon. Last colonoscopy 3/2022 by Dr. Schuyler Kevin with ulcerative proctitis. 5/23/2022: Reports ongoing diarrhea, unchanged by vancomycin. No rectal bleeding. Mild lower abdominal pain that is worse in the right lower quadrant on exam.  No nausea or vomiting. Continues to run a fever, T-max 100.4.    5/24/2022: Vancomycin increased to 500 mg yesterday. Diarrhea seems less. Had diarrhea first thing this morning and reports rectal bleeding that she says has been going on for a year although yesterday she said she was not having rectal bleeding. Ulcerative proctitis diagnosed 3/2022. She says the [facility] [place]. suppositories have never helped and she stopped taking them at home. Abdominal pain is less but persists to some degree. Some nausea this morning. For cystoscopy with ureteral stent placement today. 5/25/2022: Stool more pasty, less watery. Not as much bleeding. Refused suppository last night because she thought it was the Anusol she has been taking although we discussed started mesalamine suppository yesterday. Less pain but more bloating. No nausea or vomiting. Tolerating diet. 5/26/2022: Was having some improvement with her diarrhea but reports multiple episodes of diarrhea this morning and that she has had a \"bad morning\". Had some mucus with a small amount of blood mixed in with her stool. She is concerned about fluid on her abdomen which she says has been there for months. Review of the CT does not show any fluid in her abdomen so I suspect this is ongoing issues with bloating. She has had some nausea but no vomiting. Has not been able to eat today. Tmax 99.2.     5/27/2022: Ongoing diarrhea, 9 episodes documented. No rectal bleeding. Hungry this morning, breakfast is late. Low grade fever, 99.1. No nausea or vomiting. Abdominal pain somewhat better. ). Plan:   (- Continue Dificid, has only received 1 dose thus far.  Hopefully will see some improvement in diarrhea soon. - Continue Canasa suppository at HS ). Electronically signed by Laura Beltran NP on 5/27/2022 at 3:58 PM    Too soon to  treatment failure with fidaxomicin. Would continue.   No Headley MD

## 2022-05-27 NOTE — PROGRESS NOTES
Comprehensive Nutrition Assessment    Type and Reason for Visit: Initial,RD nutrition re-screen/LOS    Nutrition Recommendations/Plan:   1. Continue regular diet order  2. Continue Banatrol TID to aid in BM management      Malnutrition Assessment:  Malnutrition Status:  Mild malnutrition (05/27/22 1020)    Context:  Acute illness     Findings of the 6 clinical characteristics of malnutrition:   Energy Intake:  No significant decrease in energy intake  Weight Loss:  No significant weight loss     Body Fat Loss:  No significant body fat loss,     Muscle Mass Loss:  Mild muscle mass loss, Clavicles (pectoralis & deltoids)  Fluid Accumulation:  No significant fluid accumulation,     Strength:  Not performed     Nutrition Assessment:     Pt is a 75year old female admitted with Colitis [K52.9]. She  has a past medical history of C. difficile colitis, cervical cancer, Lyme disease, skin cancer, basal cell, and Kidney stones. Patient states -130#; quit smoking in December 2020 and gained weight from 115#. Per documentation, patient has weighed ~130# x 5 years. No N/V. No chewing/swallowing problems. Noted allergy to 'seafood'. States appetite is 'okay', usually eats 5-6 small meals/day so has trouble consuming 3 larger meals. Was started on banatrol yesterday to control diarrhea, patient reports consuming 100% of this last night and 'already seeing a difference'. Has not yet consumed this morning. No need for ONS at this time, but patient with mild clavicle muscle loss. Confirms episode of diarrhea this morning.      Wt Readings from Last 10 Encounters:   05/21/22 60.4 kg (133 lb 2.5 oz)   04/24/22 54.4 kg (120 lb)   05/26/21 58.1 kg (128 lb)   10/18/17 58.1 kg (128 lb 1.4 oz)   10/14/17 59.3 kg (130 lb 11.2 oz)     Patient Vitals for the past 168 hrs:   % Diet Eaten   05/26/22 0502 0%   05/25/22 2000 0%   05/24/22 1701 76 - 100%   05/24/22 1009 0%   05/23/22 0440 0%   05/22/22 2241 0%   05/22/22 2049 0% Nutrition Related Findings:      Wound Type: Surgical incision (perineum)     Last Bowel Movement Date: 05/27/22  Stool Appearance: Loose  Abdominal Assessment: Distended  Appetite: Fair  Bowel Sounds: Active   Edema:No data recorded    Nutr. Labs:      Lab Results   Component Value Date/Time    GFR est AA >60 05/26/2022 01:05 AM    GFR est non-AA 50 (L) 05/26/2022 01:05 AM    Creatinine 1.08 (H) 05/26/2022 01:05 AM    BUN 12 05/26/2022 01:05 AM    Sodium 143 05/26/2022 01:05 AM    Potassium 3.9 05/26/2022 01:05 AM    Chloride 110 (H) 05/26/2022 01:05 AM    CO2 26 05/26/2022 01:05 AM       Lab Results   Component Value Date/Time    Glucose 95 05/26/2022 01:05 AM    Glucose (POC) 93 10/16/2017 09:19 AM       Lab Results   Component Value Date/Time    Hemoglobin A1c 5.5 10/15/2017 02:24 PM       Nutr. Meds:  Lovenox, dificid, risaquad, lactated ringers @ 75, canasa, zofran PRN, protonix, miralax PRN     Current Nutrition Intake & Therapies:  Average Meal Intake: %  Average Supplement Intake: %  ADULT DIET Regular  ADULT ORAL NUTRITION SUPPLEMENT Breakfast, Lunch, Dinner; Other Supplement; Banatrol with meals    Anthropometric Measures:  Height: 5' 1\" (154.9 cm)  Ideal Body Weight (IBW): 105 lbs (48 kg)  Admission Body Weight: 133 lb 2.5 oz  Current Body Wt:  60.4 kg (133 lb 2.5 oz), 126.8 % IBW. Stated  Current BMI (kg/m2): 25.2        Weight Adjustment: No adjustment                 BMI Category: Overweight (BMI 25.0-29. 9)    Estimated Daily Nutrient Needs:  Energy Requirements Based On: Formula  Weight Used for Energy Requirements: Current  Energy (kcal/day): 1394 (MSJ x 1.3)  Weight Used for Protein Requirements: Current  Protein (g/day): 60 (1.0 g/kg)  Method Used for Fluid Requirements: 1 ml/kcal  Fluid (ml/day): 9012    Nutrition Diagnosis:   · Inadequate oral intake (predicted) related to inadequate protein-energy intake as evidenced by mild muscle loss, reports of low PO intake PTA    Nutrition Interventions:   Food and/or Nutrient Delivery: Continue current diet  Nutrition Education/Counseling: No recommendations at this time  Coordination of Nutrition Care: Continue to monitor while inpatient,Interdisciplinary rounds       Goals:     Goals: Meet at least 75% of estimated needs,by next RD assessment       Nutrition Monitoring and Evaluation:   Behavioral-Environmental Outcomes: None identified  Food/Nutrient Intake Outcomes: Food and nutrient intake,Supplement intake  Physical Signs/Symptoms Outcomes: Biochemical data,Fluid status or edema,Diarrhea,Weight    Discharge Planning:     Too soon to determine    Ghassan Edwards MS, RD  Contact: Ext: [phone], or via [employer] T[place]or[place] Counseling:  Patient advised that medication is irritating and drying.  Patient may need to apply sparingly and wash off after an hour before eventually leaving it on overnight.  The patient verbalized understanding of the proper use and possible adverse effects of tazorac.  All of the patient's questions and concerns were addressed.

## 2022-05-27 NOTE — PROGRESS NOTES
Problem: Risk for Spread of Infection  Goal: Prevent transmission of infectious organism to others  Description: Prevent the transmission of infectious organisms to other patients, staff members, and visitors. Outcome: Progressing Towards Goal     Problem: Patient Education:  Go to Education Activity  Goal: Patient/Family Education  Outcome: Progressing Towards Goal     Problem: Falls - Risk of  Goal: *Absence of Falls  Description: Document Pearl Garcias Fall Risk and appropriate interventions in the flowsheet. Outcome: Progressing Towards Goal  Note: Fall Risk Interventions:            Medication Interventions: Teach patient to arise slowly    Elimination Interventions: Call light in reach              Problem: Patient Education: Go to Patient Education Activity  Goal: Patient/Family Education  Outcome: Progressing Towards Goal     Problem: Pain  Goal: *Control of Pain  Outcome: Progressing Towards Goal  Goal: *PALLIATIVE CARE:  Alleviation of Pain  Outcome: Progressing Towards Goal     Problem: Patient Education: Go to Patient Education Activity  Goal: Patient/Family Education  Outcome: Progressing Towards Goal     Problem:  Activity Intolerance  Goal: *Oxygen saturation during activity within specified parameters  Outcome: Progressing Towards Goal  Goal: *Able to remain out of bed as prescribed  Outcome: Progressing Towards Goal     Problem: Patient Education: Go to Patient Education Activity  Goal: Patient/Family Education  Outcome: Progressing Towards Goal     Problem: Patient Education: Go to Patient Education Activity  Goal: Patient/Family Education  Outcome: Progressing Towards Goal

## 2022-05-27 NOTE — PROGRESS NOTES
Shaan Egan [facility] 79  [address] [facility], [address] [place] [address]  [phone]      Medical Progress Note      NAME: Isabella Stuart   :  1955  MRM:  [id number]    Date/Time of service: 2022  12:49 PM       Subjective:     Chief Complaint:  Patient was personally seen and examined by me during this time period. Chart reviewed. Diarrhea the same. GI is switching to Dificid. Wanted something for sleep        Objective:       Vitals:       Last 24hrs VS reviewed since prior progress note.  Most recent are:    Visit Vitals  /66   Pulse 80   Temp 98.9 °F (37.2 °C)   Resp 18   Ht 5' 1\" (1.549 m)   Wt 60.4 kg (133 lb 2.5 oz)   SpO2 93%   BMI 25.16 kg/m²     SpO2 Readings from Last 6 Encounters:   22 93%   22 95%   21 95%   10/18/17 100%    O2 Flow Rate (L/min): 2 l/min       Intake/Output Summary (Last 24 hours) at 2022 1249  Last data filed at 2022 0606  Gross per 24 hour   Intake 3182 ml   Output    Net 3182 ml        Exam:     Physical Exam:    Gen:  Elderly, ill-appearing, NAD  HEENT:  Pink conjunctivae, PERRL, hearing intact to voice, moist mucous membranes  Neck:  Supple, without masses, thyroid non-tender  Resp:  No accessory muscle use, clear breath sounds without wheezes rales or rhonchi  Card:  No murmurs, normal S1, S2 without thrills, bruits or peripheral edema  Abd:  Soft, non-tender, non-distended, normoactive bowel sounds are present  Musc:  No cyanosis or clubbing  Skin:  No rashes  Neuro:  Cranial nerves 3-12 are grossly intact, follows commands appropriately  Psych:  poor insight, oriented to person, place and time, alert    Medications Reviewed: (see below)    Lab Data Reviewed: (see below)    ______________________________________________________________________    Medications:     Current Facility-Administered Medications   Medication Dose Route Frequency    ALPRAZolam (XANAX) tablet 0.5 mg  0.5 mg Oral QHS PRN    methen-m.blue-s.phos-phsal-hyo (URELLE) 81-10.8-40.8 mg tablet tab 1 Tablet  1 Tablet Oral QID PRN    fidaxomicin (DIFICID) tablet 200 mg  200 mg Oral BID    pantoprazole (PROTONIX) tablet 40 mg  40 mg Oral DAILY    oxyCODONE IR (ROXICODONE) tablet 5 mg  5 mg Oral Q4H PRN    lactated Ringers infusion  75 mL/hr IntraVENous CONTINUOUS    mesalamine (CANASA) suppository 1,000 mg  1 Suppository Rectal QHS    HYDROmorphone (DILAUDID) injection 1 mg  1 mg IntraVENous Q4H PRN    L.acidophilus-paracasei-S.thermophil-bifidobacter (RISAQUAD) 8 billion cell capsule  1 Capsule Oral DAILY    prochlorperazine (COMPAZINE) injection 5 mg  5 mg IntraVENous Q6H PRN    acetaminophen (TYLENOL) tablet 650 mg  650 mg Oral Q6H PRN    polyethylene glycol (MIRALAX) packet 17 g  17 g Oral DAILY PRN    ondansetron (ZOFRAN) injection 4 mg  4 mg IntraVENous Q6H PRN    enoxaparin (LOVENOX) injection 40 mg  40 mg SubCUTAneous DAILY          Lab Review:     Recent Labs     05/26/22  0105 05/25/22  0302   WBC 10.3 11.8*   HGB 12.3 12.2   HCT 39.3 37.5    256     Recent Labs     05/26/22  0105 05/25/22  0302    141   K 3.9 4.4   * 110*   CO2 26 23   GLU 95 136*   BUN 12 12   CREA 1.08* 0.87   CA 8.7 8.2*   MG 1.9 2.0   PHOS 3.6 2.8     Lab Results   Component Value Date/Time    Glucose (POC) 93 10/16/2017 09:19 AM    Glucose (POC) 116 (H) 10/15/2017 02:12 PM    Glucose (POC) 141 (H) 10/14/2017 01:14 PM          Assessment / Plan:     76 yo hx of ulcerative colitis, presented w/ sepsis, C-diff colitis, L hydronephrosis w/ obstructing stone, SPENCER    1) C-diff colitis: sepsis POA, now resolved. Still with significant diarrhea. GI changing PO Vanc to Dificid. Cont pro-biotics. Defer to GI regarding fecal transplant    2) L hydronephrosis from an obstructing stone: s/p cysto and L ureteral stent by Urology on 05/24.   Will f/u with [address] [facility] for definitive stone treatments     3) Complicated UTI due to obstructing stone: urine Cx neg. Will hold on systemic IV abx due to C-diff    4) UC: cont mesalamine.   Management per GI    5) HypoMg/phos: repleted IV, monitor prn    6) Insomnia: will try low dose xanax at night     Total time spent with patient care: 30 Minutes **I personally saw and examined the patient during this time period**                 Care Plan discussed with: Patient, nursing     Discussed:  Care Plan    Prophylaxis:  Lovenox    Disposition:  SNF/LTC           ___________________________________________________    Attending Physician: Reid Massey MD

## 2022-05-27 NOTE — PROGRESS NOTES
Physician Progress Note      PATIENT:               Lucio Martienz  CSN #:                  [id number]  :                       1955  ADMIT DATE:       2022 11:57 AM  DISCH DATE:  RESPONDING  PROVIDER #:        Larry Garcia DO, MD          QUERY TEXT:    Dear Attending,    Patient admitted with C-diff colitis and Left hydronephrosis with UTI, noted to have  RD assessment. If possible, please document in progress notes and discharge summary if you are evaluating and /or treating any of the following: The medical record reflects the following:  Risk Factors: C-diff colitis, diarrhea  Clinical Indicators:  RD- Malnutrition Status:  Mild malnutrition (22 1020)  Context:  Acute illness  Muscle Mass Loss:  Mild muscle mass loss, Clavicles (pectoralis & deltoids)  Treatment: RD assessment, Banatrol with meals breakfast, lunch, and dinner, monitoring    ASPEN Criteria:  https://aspenjournals. onlinelibrary. stevens. com/doi/full/10.1177/6708984213777589      Thank you,    Boris Horan RN  [facility]  [phone]  Options provided:  -- Protein calorie malnutrition mild  -- Protein calorie malnutrition- unspecified  -- Other - I will add my own diagnosis  -- Disagree - Not applicable / Not valid  -- Disagree - Clinically unable to determine / Unknown  -- Refer to Clinical Documentation Reviewer    PROVIDER RESPONSE TEXT:    This patient has mild protein calorie malnutrition.     Query created by: Malena Reyes on 2022 1:55 PM      Electronically signed by:  Chris Mckeon MD 2022 2:00 PM

## 2022-05-28 LAB
ANION GAP SERPL CALC-SCNC: 6 MMOL/L (ref 5–15)
BUN SERPL-MCNC: 6 MG/DL (ref 6–20)
BUN/CREAT SERPL: 7 (ref 12–20)
CALCIUM SERPL-MCNC: 8.8 MG/DL (ref 8.5–10.1)
CHLORIDE SERPL-SCNC: 106 MMOL/L (ref 97–108)
CO2 SERPL-SCNC: 31 MMOL/L (ref 21–32)
CREAT SERPL-MCNC: 0.86 MG/DL (ref 0.55–1.02)
GLUCOSE SERPL-MCNC: 108 MG/DL (ref 65–100)
MAGNESIUM SERPL-MCNC: 2.1 MG/DL (ref 1.6–2.4)
PHOSPHATE SERPL-MCNC: 3.6 MG/DL (ref 2.6–4.7)
POTASSIUM SERPL-SCNC: 3.7 MMOL/L (ref 3.5–5.1)
SODIUM SERPL-SCNC: 143 MMOL/L (ref 136–145)

## 2022-05-28 PROCEDURE — 74011250637 HC RX REV CODE- 250/637: Performed by: UROLOGY

## 2022-05-28 PROCEDURE — 74011250636 HC RX REV CODE- 250/636: Performed by: UROLOGY

## 2022-05-28 PROCEDURE — 65270000029 HC RM PRIVATE

## 2022-05-28 PROCEDURE — 84100 ASSAY OF PHOSPHORUS: CPT

## 2022-05-28 PROCEDURE — 36415 COLL VENOUS BLD VENIPUNCTURE: CPT

## 2022-05-28 PROCEDURE — 74011250637 HC RX REV CODE- 250/637: Performed by: INTERNAL MEDICINE

## 2022-05-28 PROCEDURE — 80048 BASIC METABOLIC PNL TOTAL CA: CPT

## 2022-05-28 PROCEDURE — 74011250637 HC RX REV CODE- 250/637: Performed by: NURSE PRACTITIONER

## 2022-05-28 PROCEDURE — 83735 ASSAY OF MAGNESIUM: CPT

## 2022-05-28 PROCEDURE — 74011250636 HC RX REV CODE- 250/636: Performed by: INTERNAL MEDICINE

## 2022-05-28 RX ORDER — ALPRAZOLAM 0.5 MG/1
0.25 TABLET ORAL
Status: DISCONTINUED | OUTPATIENT
Start: 2022-05-28 | End: 2022-05-30

## 2022-05-28 RX ADMIN — ENOXAPARIN SODIUM 40 MG: 100 INJECTION SUBCUTANEOUS at 10:00

## 2022-05-28 RX ADMIN — HYDROMORPHONE HYDROCHLORIDE 1 MG: 1 INJECTION, SOLUTION INTRAMUSCULAR; INTRAVENOUS; SUBCUTANEOUS at 10:13

## 2022-05-28 RX ADMIN — FIDAXOMICIN 200 MG: 200 TABLET, FILM COATED ORAL at 10:03

## 2022-05-28 RX ADMIN — SODIUM CHLORIDE, POTASSIUM CHLORIDE, SODIUM LACTATE AND CALCIUM CHLORIDE 75 ML/HR: 600; 310; 30; 20 INJECTION, SOLUTION INTRAVENOUS at 18:02

## 2022-05-28 RX ADMIN — HYDROMORPHONE HYDROCHLORIDE 1 MG: 1 INJECTION, SOLUTION INTRAMUSCULAR; INTRAVENOUS; SUBCUTANEOUS at 05:01

## 2022-05-28 RX ADMIN — ONDANSETRON 4 MG: 2 INJECTION INTRAMUSCULAR; INTRAVENOUS at 05:01

## 2022-05-28 RX ADMIN — HYDROMORPHONE HYDROCHLORIDE 1 MG: 1 INJECTION, SOLUTION INTRAMUSCULAR; INTRAVENOUS; SUBCUTANEOUS at 17:06

## 2022-05-28 RX ADMIN — ALPRAZOLAM 0.25 MG: 0.5 TABLET ORAL at 21:39

## 2022-05-28 RX ADMIN — FIDAXOMICIN 200 MG: 200 TABLET, FILM COATED ORAL at 18:50

## 2022-05-28 RX ADMIN — MESALAMINE 1000 MG: 1000 SUPPOSITORY RECTAL at 21:39

## 2022-05-28 RX ADMIN — SODIUM CHLORIDE, POTASSIUM CHLORIDE, SODIUM LACTATE AND CALCIUM CHLORIDE 75 ML/HR: 600; 310; 30; 20 INJECTION, SOLUTION INTRAVENOUS at 05:01

## 2022-05-28 RX ADMIN — PANTOPRAZOLE SODIUM 40 MG: 40 TABLET, DELAYED RELEASE ORAL at 09:59

## 2022-05-28 RX ADMIN — OXYCODONE 5 MG: 5 TABLET ORAL at 18:50

## 2022-05-28 RX ADMIN — Medication 1 CAPSULE: at 09:59

## 2022-05-28 RX ADMIN — ONDANSETRON 4 MG: 2 INJECTION INTRAMUSCULAR; INTRAVENOUS at 10:09

## 2022-05-28 RX ADMIN — HYDROMORPHONE HYDROCHLORIDE 1 MG: 1 INJECTION, SOLUTION INTRAMUSCULAR; INTRAVENOUS; SUBCUTANEOUS at 21:39

## 2022-05-28 NOTE — PROGRESS NOTES
Bedside and Verbal shift change report given to [address] [place] [address] Ne (oncoming nurse) by Tex Barrera RN (offgoing nurse). Report included the following information SBAR, Kardex, Intake/Output, MAR and Recent Results.

## 2022-05-28 NOTE — PROGRESS NOTES
Problem: Risk for Spread of Infection  Goal: Prevent transmission of infectious organism to others  Description: Prevent the transmission of infectious organisms to other patients, staff members, and visitors. Outcome: Progressing Towards Goal     Problem: Patient Education:  Go to Education Activity  Goal: Patient/Family Education  Outcome: Progressing Towards Goal     Problem: Falls - Risk of  Goal: *Absence of Falls  Description: Document Lawrence Topton Fall Risk and appropriate interventions in the flowsheet. Outcome: Progressing Towards Goal  Note: Fall Risk Interventions:            Medication Interventions: Teach patient to arise slowly    Elimination Interventions: Call light in reach              Problem: Patient Education: Go to Patient Education Activity  Goal: Patient/Family Education  Outcome: Progressing Towards Goal     Problem: Pain  Goal: *Control of Pain  Outcome: Progressing Towards Goal  Goal: *PALLIATIVE CARE:  Alleviation of Pain  Outcome: Progressing Towards Goal     Problem: Patient Education: Go to Patient Education Activity  Goal: Patient/Family Education  Outcome: Progressing Towards Goal     Problem:  Activity Intolerance  Goal: *Oxygen saturation during activity within specified parameters  Outcome: Progressing Towards Goal  Goal: *Able to remain out of bed as prescribed  Outcome: Progressing Towards Goal     Problem: Patient Education: Go to Patient Education Activity  Goal: Patient/Family Education  Outcome: Progressing Towards Goal     Problem: Patient Education: Go to Patient Education Activity  Goal: Patient/Family Education  Outcome: Progressing Towards Goal     Problem: Nutrition Deficit  Goal: *Optimize nutritional status  Outcome: Progressing Towards Goal

## 2022-05-28 NOTE — PROGRESS NOTES
[facility]  [address] [facility], [address] [place] [address]  [phone]      Medical Progress Note      NAME:         Senia Weinberg   :        1955  MRM:        [id number]    Date of service: 2022      HPI:    Subjective: Patient has been seen and examined as a follow up for multiple medical issues. Chart, labs, diagnostics reviewed. She has less diarrhea and now stool is pasty-like. No abdominal pain, fever or chills. No nausea or vomiting    ROS: She denies any cough. No SOB. No headaches. No dysuria or frequency    Objective:    Vital Signs:    Visit Vitals  /69 (BP 1 Location: Left upper arm, BP Patient Position: At rest)   Pulse 92   Temp 98.8 °F (37.1 °C)   Resp 18   Ht 5' 1\" (1.549 m)   Wt 60.4 kg (133 lb 2.5 oz)   SpO2 93%   BMI 25.16 kg/m²      No intake or output data in the 24 hours ending 22 1043     Physical Examination:    General:   Weak looking and not in any distress   Eyes:   pink conjunctivae, PERRLA with no discharge. ENT:   no ottorrhea or rhinorrhea with dry mucous membranes  Neck: no masses, thyroid non-tender and trachea central.  Pulm:  clear breath sounds without crackles or wheezes  Card:  has regular and normal S1, S2 without thrills, bruits or peripheral edema  Abd:  Soft, non-tender, non-distended, normoactive bowel sounds   Musc:  No cyanosis, clubbing, atrophy or deformities. Skin:  No rashes, bruising or ulcers. Neuro: Awake and alert.  Generally a non focal exam. Follows commands appropriately  Psych:  Has a good insight and is oriented x 3    Current Facility-Administered Medications   Medication Dose Route Frequency    ALPRAZolam (XANAX) tablet 0.5 mg  0.5 mg Oral QHS PRN    herlinda-m.blue-s.phos-phsal-hyo (URELLE) 81-10.8-40.8 mg tablet tab 1 Tablet  1 Tablet Oral QID PRN    fidaxomicin (DIFICID) tablet 200 mg  200 mg Oral BID    pantoprazole (PROTONIX) tablet 40 mg  40 mg Oral DAILY    oxyCODONE IR (ROXICODONE) tablet 5 mg  5 mg Oral Q4H PRN    lactated Ringers infusion  75 mL/hr IntraVENous CONTINUOUS    mesalamine (CANASA) suppository 1,000 mg  1 Suppository Rectal QHS    HYDROmorphone (DILAUDID) injection 1 mg  1 mg IntraVENous Q4H PRN    L.acidophilus-paracasei-S.thermophil-bifidobacter (RISAQUAD) 8 billion cell capsule  1 Capsule Oral DAILY    prochlorperazine (COMPAZINE) injection 5 mg  5 mg IntraVENous Q6H PRN    acetaminophen (TYLENOL) tablet 650 mg  650 mg Oral Q6H PRN    polyethylene glycol (MIRALAX) packet 17 g  17 g Oral DAILY PRN    ondansetron (ZOFRAN) injection 4 mg  4 mg IntraVENous Q6H PRN    enoxaparin (LOVENOX) injection 40 mg  40 mg SubCUTAneous DAILY        Laboratory data and review:    Recent Labs     05/26/22  0105   WBC 10.3   HGB 12.3   HCT 39.3        Recent Labs     05/28/22  0444 05/26/22  0105    143   K 3.7 3.9    110*   CO2 31 26   * 95   BUN 6 12   CREA 0.86 1.08*   CA 8.8 8.7   MG 2.1 1.9   PHOS 3.6 3.6     No components found for: [place]    Diagnostics: Imaging studies have been reviewed    Assessment and Plan:    74 yo hx of ulcerative colitis, presented w/ sepsis, C-diff colitis, L hydronephrosis w/ obstructing stone, Peggy     Clostridium difficile diarrhea (5/22/2022) POA: CT abdomen and pelvis showed colitis of the left transverse colon as well as rectum and lower sigmoid. Initially on Vancomycin. Now on Dificid. Continue to monitor clinical progress. Probiotics. Diet as tolerated. Gi following     L hydronephrosis from an obstructing stone POA: CT abdomen and pelvis had shown a left proximal ureteral 10 mm calculous. Seen by Urology and she is sp cysto and L ureteral stent on 05/24. Will f/u with [facility] for definitive stone treatments      Complicated UTI due to obstructing stone POA: urine Cx neg. HOLD off systemic IV abx due to C-diff     UC: continue Mesalamine. Gi following.      Insomnia: Better.  Continue Xanax QHS prn while here    Total time spent for the patient's care: [facility] discussed with: Patient, Care Manager and Nursing Staff    Discussed:  Care Plan and D/C Planning    Prophylaxis:  Lovenox    Anticipated Disposition:  Home w/Family           ___________________________________________________    Attending Physician:   Jannette Arellano MD

## 2022-05-28 NOTE — PROGRESS NOTES
Marge Land M.D.  [phone]           GI PROGRESS NOTE        NAME: Evelyn Samuel   :  1955   MRN:  [id number]       Subjective:   Continues with diarrhea, same frequency, denies bleeding, reports some RLQ pain, relieved with pain meds. Tolerating diet well. Objective: In NAD      VITALS:   Last 24hrs VS reviewed since prior progress note. Most recent are:  Visit Vitals  /65 (BP 1 Location: Left upper arm, BP Patient Position: At rest)   Pulse 94   Temp 98.6 °F (37 °C)   Resp 18   Ht 5' 1\" (1.549 m)   Wt 60.4 kg (133 lb 2.5 oz)   SpO2 92%   BMI 25.16 kg/m²       Intake/Output Summary (Last 24 hours) at 2022 1244  Last data filed at 2022 1015  Gross per 24 hour   Intake 2182.5 ml   Output    Net 2182.5 ml       PHYSICAL EXAM:  General: Alert, in no acute distress    HEENT: Anicteric sclerae. Lungs:            CTA Bilaterally. Heart:  Regular  rhythm,    Abdomen: Soft, Non distended, Non tender.  (+)Bowel sounds, no HSM  Extremities: No c/c/e  Neurologic:  CN 2-12 gi, Alert and oriented X 3. No acute neurological distress   Psych:   Good insight. Not anxious nor agitated. Lab Data Reviewed:   Recent Labs     22  0105   WBC 10.3   HGB 12.3   HCT 39.3        Recent Labs     22  0444 22  0105    143   K 3.7 3.9    110*   CO2 31 26   BUN 6 12   CREA 0.86 1.08*   * 95   PHOS 3.6 3.6   CA 8.8 8.7     No results for input(s): AP, TBIL, TP, ALB, GLOB, GGT, AML, LPSE in the last 72 hours.     No lab exists for component: SGOT, GPT, AMYP, HLPSE    ________________________________________________________________________  Patient Active Problem List   Diagnosis Code    Nephrolithiasis N20.0    Hydronephrosis N13.30    Flank pain R10.9    Renal insufficiency N28.9    Bandemia D72.825    Hypokalemia E87.6    Dehydration E86.0    Colitis K52.9    Clostridium difficile diarrhea A04.72         Assessment and Plan:  UC proctitis, continue with mesalamine suppositories  C. Diff associated diarrhea, failed oral vancomycin, started on fidaxomicin for a 10 day course. Monitoring at this point. Continue to monitor lytes. Will follow with you.        Signed By: Gustavo Oreilly MD     5/28/2022  12:44 PM

## 2022-05-29 PROCEDURE — 74011250637 HC RX REV CODE- 250/637: Performed by: INTERNAL MEDICINE

## 2022-05-29 PROCEDURE — 74011250636 HC RX REV CODE- 250/636: Performed by: UROLOGY

## 2022-05-29 PROCEDURE — 74011250637 HC RX REV CODE- 250/637: Performed by: UROLOGY

## 2022-05-29 PROCEDURE — 74011250636 HC RX REV CODE- 250/636: Performed by: INTERNAL MEDICINE

## 2022-05-29 PROCEDURE — 74011250637 HC RX REV CODE- 250/637: Performed by: NURSE PRACTITIONER

## 2022-05-29 PROCEDURE — 65270000029 HC RM PRIVATE

## 2022-05-29 RX ADMIN — ALPRAZOLAM 0.25 MG: 0.5 TABLET ORAL at 23:54

## 2022-05-29 RX ADMIN — OXYCODONE 5 MG: 5 TABLET ORAL at 09:36

## 2022-05-29 RX ADMIN — OXYCODONE 5 MG: 5 TABLET ORAL at 14:43

## 2022-05-29 RX ADMIN — FIDAXOMICIN 200 MG: 200 TABLET, FILM COATED ORAL at 10:28

## 2022-05-29 RX ADMIN — HYDROMORPHONE HYDROCHLORIDE 1 MG: 1 INJECTION, SOLUTION INTRAMUSCULAR; INTRAVENOUS; SUBCUTANEOUS at 22:40

## 2022-05-29 RX ADMIN — PANTOPRAZOLE SODIUM 40 MG: 40 TABLET, DELAYED RELEASE ORAL at 09:40

## 2022-05-29 RX ADMIN — SODIUM CHLORIDE, POTASSIUM CHLORIDE, SODIUM LACTATE AND CALCIUM CHLORIDE 75 ML/HR: 600; 310; 30; 20 INJECTION, SOLUTION INTRAVENOUS at 06:59

## 2022-05-29 RX ADMIN — MESALAMINE 1000 MG: 1000 SUPPOSITORY RECTAL at 22:40

## 2022-05-29 RX ADMIN — HYDROMORPHONE HYDROCHLORIDE 1 MG: 1 INJECTION, SOLUTION INTRAMUSCULAR; INTRAVENOUS; SUBCUTANEOUS at 18:21

## 2022-05-29 RX ADMIN — FIDAXOMICIN 200 MG: 200 TABLET, FILM COATED ORAL at 18:22

## 2022-05-29 RX ADMIN — HYDROMORPHONE HYDROCHLORIDE 1 MG: 1 INJECTION, SOLUTION INTRAMUSCULAR; INTRAVENOUS; SUBCUTANEOUS at 07:00

## 2022-05-29 RX ADMIN — ENOXAPARIN SODIUM 40 MG: 100 INJECTION SUBCUTANEOUS at 09:35

## 2022-05-29 RX ADMIN — SODIUM CHLORIDE, POTASSIUM CHLORIDE, SODIUM LACTATE AND CALCIUM CHLORIDE 75 ML/HR: 600; 310; 30; 20 INJECTION, SOLUTION INTRAVENOUS at 18:26

## 2022-05-29 RX ADMIN — OXYCODONE 5 MG: 5 TABLET ORAL at 00:12

## 2022-05-29 RX ADMIN — HYDROMORPHONE HYDROCHLORIDE 1 MG: 1 INJECTION, SOLUTION INTRAMUSCULAR; INTRAVENOUS; SUBCUTANEOUS at 11:44

## 2022-05-29 RX ADMIN — Medication 1 CAPSULE: at 09:36

## 2022-05-29 RX ADMIN — OXYCODONE 5 MG: 5 TABLET ORAL at 20:12

## 2022-05-29 NOTE — PROGRESS NOTES
Bedside and Verbal shift change report given to Delmis Sterling RN (oncoming nurse) by Matheus Trinh RN (offgoing nurse). Report included the following information SBAR, Procedure Summary, Intake/Output, MAR, Recent Results and Med Rec Status.

## 2022-05-29 NOTE — PROGRESS NOTES
Shaan Egan [facility] 79  [address] [facility], [address] [place] [address]  [phone]      Medical Progress Note      NAME:         Tiera Mckeon   :        1955  MRM:        [id number]    Date of service: 2022      Subjective: Patient has been seen and examined as a follow up for multiple medical issues. Chart, labs, diagnostics reviewed. She says her diarrhea has improved. Stools now getting formed. Has chronic abdominal discomfort but no nausea or vomiting. No fever or chills. ROS: She denies any cough. No SOB. No headaches. No dysuria or frequency    Objective:    Vital Signs:    Visit Vitals  /78 (BP 1 Location: Right upper arm, BP Patient Position: Sitting)   Pulse 90   Temp 97.6 °F (36.4 °C)   Resp 17   Ht 5' 1\" (1.549 m)   Wt 60.4 kg (133 lb 2.5 oz)   SpO2 95%   BMI 25.16 kg/m²          Intake/Output Summary (Last 24 hours) at 2022 1132  Last data filed at 2022 1128  Gross per 24 hour   Intake 701 ml   Output    Net 701 ml        Physical Examination:    General:   Weak looking and not in any distress   Eyes:   pink conjunctivae, PERRLA with no discharge. ENT:   no ottorrhea or rhinorrhea with dry mucous membranes  Pulm:  clear breath sounds without crackles or wheezes  Card:  has regular and normal S1, S2 without thrills, bruits or peripheral edema  Abd:  Soft, non-tender, non-distended, normoactive bowel sounds   Musc:  No cyanosis, clubbing, atrophy or deformities. Skin:  No rashes, bruising or ulcers. Neuro: Awake and alert.  Generally a non focal exam.   Psych:  Has a good insight and is oriented x 3    Current Facility-Administered Medications   Medication Dose Route Frequency    ALPRAZolam (XANAX) tablet 0.25 mg  0.25 mg Oral QHS PRN    methen-m.blue-s.phos-phsal-hyo (URELLE) 81-10.8-40.8 mg tablet tab 1 Tablet  1 Tablet Oral QID PRN    fidaxomicin (DIFICID) tablet 200 mg  200 mg Oral BID    pantoprazole (PROTONIX) tablet 40 mg  40 mg Oral DAILY    oxyCODONE IR (ROXICODONE) tablet 5 mg  5 mg Oral Q4H PRN    lactated Ringers infusion  75 mL/hr IntraVENous CONTINUOUS    mesalamine (CANASA) suppository 1,000 mg  1 Suppository Rectal QHS    HYDROmorphone (DILAUDID) injection 1 mg  1 mg IntraVENous Q4H PRN    L.acidophilus-paracasei-S.thermophil-bifidobacter (RISAQUAD) 8 billion cell capsule  1 Capsule Oral DAILY    prochlorperazine (COMPAZINE) injection 5 mg  5 mg IntraVENous Q6H PRN    acetaminophen (TYLENOL) tablet 650 mg  650 mg Oral Q6H PRN    polyethylene glycol (MIRALAX) packet 17 g  17 g Oral DAILY PRN    ondansetron (ZOFRAN) injection 4 mg  4 mg IntraVENous Q6H PRN    enoxaparin (LOVENOX) injection 40 mg  40 mg SubCUTAneous DAILY        Laboratory data and review:    No results for input(s): WBC, HGB, HCT, PLT, HGBEXT, HCTEXT, PLTEXT, HGBEXT, HCTEXT, PLTEXT in the last 72 hours. Recent Labs     05/28/22  0444      K 3.7      CO2 31   *   BUN 6   CREA 0.86   CA 8.8   MG 2.1   PHOS 3.6     No components found for: [place]    Diagnostics: Imaging studies have been reviewed    Assessment and Plan:    76 yo hx of ulcerative colitis, presented w/ sepsis, C-diff colitis, L hydronephrosis w/ obstructing stone, Peggy     Clostridium difficile diarrhea (5/22/2022) POA: getting better now with slightly formed stools. CT abdomen and pelvis showed colitis of the left transverse colon as well as rectum and lower sigmoid. Failed PO Vancomycin. Continue Dificid. Probiotics. Diet as tolerated. Gi following and will guide on discharge regimen    L hydronephrosis from an obstructing stone POA: CT abdomen and pelvis had shown a left proximal ureteral 10 mm calculous. Seen by Urology and she is sp cysto and L ureteral stent on 05/24. Currently stable. Will f/u with [facility] for definitive stone treatments      Ulcerative Colitis POA: continue Mesalamine. Gi following.      Insomnia: Better. Continue Xanax QHS prn while here    Complicated UTI due to obstructing stone POA: initially suspected but given urine Cx neg. Infection RULED out.      Total time spent for the patient's care: [facility] discussed with: Patient, Care Manager and Nursing Staff    Discussed:  Care Plan and D/C Planning    Prophylaxis:  Lovenox    Anticipated Disposition:  Home w/Family           ___________________________________________________    Attending Physician:   Laura Fu MD

## 2022-05-29 NOTE — PROGRESS NOTES
Giovani Keen M.D.  [phone]           GI PROGRESS NOTE        NAME: Boris Leader   :  1955   MRN:  [id number]       Subjective:   Continues with diarrhea, reports every 20 to 30 minutes, however reports starting to see some formed stools. Denies pain      Objective: In NAD      VITALS:   Last 24hrs VS reviewed since prior progress note. Most recent are:  Visit Vitals  /78 (BP 1 Location: Right upper arm, BP Patient Position: Sitting)   Pulse 90   Temp 97.6 °F (36.4 °C)   Resp 17   Ht 5' 1\" (1.549 m)   Wt 60.4 kg (133 lb 2.5 oz)   SpO2 95%   BMI 25.16 kg/m²       Intake/Output Summary (Last 24 hours) at 2022 1137  Last data filed at 2022 1128  Gross per 24 hour   Intake 701 ml   Output    Net 701 ml       PHYSICAL EXAM:  General: Alert, in no acute distress    HEENT: Anicteric sclerae. Lungs:            CTA Bilaterally. Heart:  Regular  rhythm,    Abdomen: Soft, Non distended, Non tender.  (+)Bowel sounds, no HSM  Extremities: No c/c/e  Neurologic:  CN 2-12 gi, Alert and oriented X 3. No acute neurological distress   Psych:   Good insight. Not anxious nor agitated. Lab Data Reviewed:   No results for input(s): WBC, HGB, HCT, PLT, HGBEXT, HCTEXT, PLTEXT, HGBEXT, HCTEXT, PLTEXT in the last 72 hours. Recent Labs     22  0444      K 3.7      CO2 31   BUN 6   CREA 0.86   *   PHOS 3.6   CA 8.8     No results for input(s): AP, TBIL, TP, ALB, GLOB, GGT, AML, LPSE in the last 72 hours.     No lab exists for component: SGOT, GPT, AMYP, HLPSE    ________________________________________________________________________  Patient Active Problem List   Diagnosis Code    Nephrolithiasis N20.0    Hydronephrosis N13.30    Flank pain R10.9    Renal insufficiency N28.9    Bandemia D72.825    Hypokalemia E87.6    Dehydration E86.0    Colitis K52.9    Clostridium difficile diarrhea A04.72         Assessment and Plan:  UC proctitis, continue with mesalamine suppositories  C. Diff associated diarrhea, failed oral vancomycin, started on fidaxomicin for a 10 day course. Continue same course, hopefully starting to see improvement. Following.      Signed By: Janet Manzano MD     5/29/2022  11:39 AM

## 2022-05-30 PROCEDURE — 74011250637 HC RX REV CODE- 250/637: Performed by: UROLOGY

## 2022-05-30 PROCEDURE — 74011250636 HC RX REV CODE- 250/636: Performed by: UROLOGY

## 2022-05-30 PROCEDURE — 74011250637 HC RX REV CODE- 250/637: Performed by: NURSE PRACTITIONER

## 2022-05-30 PROCEDURE — 74011250636 HC RX REV CODE- 250/636: Performed by: INTERNAL MEDICINE

## 2022-05-30 PROCEDURE — 74011250637 HC RX REV CODE- 250/637: Performed by: INTERNAL MEDICINE

## 2022-05-30 PROCEDURE — 65270000029 HC RM PRIVATE

## 2022-05-30 RX ADMIN — ONDANSETRON 4 MG: 2 INJECTION INTRAMUSCULAR; INTRAVENOUS at 07:03

## 2022-05-30 RX ADMIN — ACETAMINOPHEN 650 MG: 325 TABLET ORAL at 07:04

## 2022-05-30 RX ADMIN — ONDANSETRON 4 MG: 2 INJECTION INTRAMUSCULAR; INTRAVENOUS at 17:53

## 2022-05-30 RX ADMIN — HYDROMORPHONE HYDROCHLORIDE 1 MG: 1 INJECTION, SOLUTION INTRAMUSCULAR; INTRAVENOUS; SUBCUTANEOUS at 03:39

## 2022-05-30 RX ADMIN — HYDROMORPHONE HYDROCHLORIDE 1 MG: 1 INJECTION, SOLUTION INTRAMUSCULAR; INTRAVENOUS; SUBCUTANEOUS at 08:10

## 2022-05-30 RX ADMIN — HYOSCYAMINE SULFATE, METHENAMINE, METHYLENE BLUE, PHENYL SALICYLATE, AND SODIUM PHOSPHATE, MONOBASIC, MONOHYDRATE 1 TABLET: .12; 81; 10.8; 32.4; 40.8 TABLET ORAL at 06:56

## 2022-05-30 RX ADMIN — Medication 1 CAPSULE: at 08:10

## 2022-05-30 RX ADMIN — OXYCODONE 5 MG: 5 TABLET ORAL at 00:39

## 2022-05-30 RX ADMIN — HYDROMORPHONE HYDROCHLORIDE 1 MG: 1 INJECTION, SOLUTION INTRAMUSCULAR; INTRAVENOUS; SUBCUTANEOUS at 17:53

## 2022-05-30 RX ADMIN — FIDAXOMICIN 200 MG: 200 TABLET, FILM COATED ORAL at 18:32

## 2022-05-30 RX ADMIN — OXYCODONE 5 MG: 5 TABLET ORAL at 11:58

## 2022-05-30 RX ADMIN — MESALAMINE 1000 MG: 1000 SUPPOSITORY RECTAL at 21:19

## 2022-05-30 RX ADMIN — OXYCODONE 5 MG: 5 TABLET ORAL at 06:56

## 2022-05-30 RX ADMIN — HYDROMORPHONE HYDROCHLORIDE 1 MG: 1 INJECTION, SOLUTION INTRAMUSCULAR; INTRAVENOUS; SUBCUTANEOUS at 13:10

## 2022-05-30 RX ADMIN — HYDROMORPHONE HYDROCHLORIDE 1 MG: 1 INJECTION, SOLUTION INTRAMUSCULAR; INTRAVENOUS; SUBCUTANEOUS at 22:21

## 2022-05-30 RX ADMIN — OXYCODONE 5 MG: 5 TABLET ORAL at 21:18

## 2022-05-30 RX ADMIN — OXYCODONE 5 MG: 5 TABLET ORAL at 16:12

## 2022-05-30 RX ADMIN — FIDAXOMICIN 200 MG: 200 TABLET, FILM COATED ORAL at 08:11

## 2022-05-30 RX ADMIN — ENOXAPARIN SODIUM 40 MG: 100 INJECTION SUBCUTANEOUS at 08:11

## 2022-05-30 RX ADMIN — PANTOPRAZOLE SODIUM 40 MG: 40 TABLET, DELAYED RELEASE ORAL at 08:11

## 2022-05-30 NOTE — PROGRESS NOTES
Shaan Egan [facility] 79  [address] [facility], [place], [address] [place] [address]  [phone]      Medical Progress Note      NAME:         Isabella Stuart   :        1955  MRM:        [id number]    Date of service: 2022      Subjective: Patient has been seen and examined as a follow up for multiple medical issues. Chart, labs, diagnostics reviewed. She says her diarrhea is getting better although still with abdominal discomfort. Stools are pasty. Non bloody. No nausea or vomiting. No fever or chills. ROS: She denies any cough. No SOB. No dysuria or frequency. Some headaches    Objective:    Vital Signs:    Visit Vitals  /67 (BP 1 Location: Right upper arm, BP Patient Position: At rest)   Pulse 87   Temp 97.9 °F (36.6 °C)   Resp 16   Ht 5' 1\" (1.549 m)   Wt 60.4 kg (133 lb 2.5 oz)   SpO2 91%   BMI 25.16 kg/m²          Intake/Output Summary (Last 24 hours) at 2022 0656  Last data filed at 2022 2240  Gross per 24 hour   Intake 3561 ml   Output    Net 3561 ml        Physical Examination:    General:   Weak looking and not in any distress   Eyes:   pink conjunctivae, PERRLA with no discharge. ENT:   no ottorrhea or rhinorrhea with dry mucous membranes  Pulm:  clear breath sounds without crackles or wheezes  Card:  has regular and normal S1, S2 without thrills, bruits or peripheral edema  Abd:  Soft, non-tender, non-distended, normoactive bowel sounds   Musc:  No cyanosis, clubbing, atrophy or deformities. Neuro: Awake and alert.  Generally a non focal exam.   Psych:  Has a good insight and is oriented x 3    Current Facility-Administered Medications   Medication Dose Route Frequency    ALPRAZolam (XANAX) tablet 0.25 mg  0.25 mg Oral QHS PRN    methen-m.blue-s.phos-phsal-hyo (URELLE) 81-10.8-40.8 mg tablet tab 1 Tablet  1 Tablet Oral QID PRN    fidaxomicin (DIFICID) tablet 200 mg  200 mg Oral BID    pantoprazole (PROTONIX) tablet 40 mg  40 mg Oral DAILY    oxyCODONE IR (ROXICODONE) tablet 5 mg  5 mg Oral Q4H PRN    mesalamine (CANASA) suppository 1,000 mg  1 Suppository Rectal QHS    HYDROmorphone (DILAUDID) injection 1 mg  1 mg IntraVENous Q4H PRN    L.acidophilus-paracasei-S.thermophil-bifidobacter (RISAQUAD) 8 billion cell capsule  1 Capsule Oral DAILY    prochlorperazine (COMPAZINE) injection 5 mg  5 mg IntraVENous Q6H PRN    acetaminophen (TYLENOL) tablet 650 mg  650 mg Oral Q6H PRN    polyethylene glycol (MIRALAX) packet 17 g  17 g Oral DAILY PRN    ondansetron (ZOFRAN) injection 4 mg  4 mg IntraVENous Q6H PRN    enoxaparin (LOVENOX) injection 40 mg  40 mg SubCUTAneous DAILY        Laboratory data and review:    No results for input(s): WBC, HGB, HCT, PLT, HGBEXT, HCTEXT, PLTEXT, HGBEXT, HCTEXT, PLTEXT in the last 72 hours. Recent Labs     05/28/22  0444      K 3.7      CO2 31   *   BUN 6   CREA 0.86   CA 8.8   MG 2.1   PHOS 3.6     No components found for: [place]    Diagnostics: Imaging studies have been reviewed    Assessment and Plan:    74 yo hx of ulcerative colitis, presented w/ sepsis, C-diff colitis, L hydronephrosis w/ obstructing stone, Peggy     Clostridium difficile diarrhea (5/22/2022) POA: symptoms have resolved and with no more diarrhea. Has formed stools now. Minimal abdominal symptoms. Afebrile. WBCs normal. CT abdomen and pelvis showed colitis of the left transverse colon as well as rectum and lower sigmoid. Failed PO Vancomycin. Continue Dificid. Probiotics. Diet as tolerated. Gi following and will guide on discharge timing and regimen. Outpatient follow up as needed. L hydronephrosis from an obstructing stone POA: CT abdomen and pelvis had shown a left proximal ureteral 10 mm calculous. Seen by Urology and she is sp cysto and L ureteral stent on 05/24. Currently stable.  She will follow up with [facility] for definitive stone treatments      Ulcerative Colitis POA: currently with no symptoms. Continue Mesalamine. Outpatient follow up with Gi. Insomnia: this is a chronic issue. Continue Xanax QHS prn while here    Complicated UTI due to obstructing stone POA: initially suspected but given urine Cx neg. Infection RULED out.      Total time spent for the patient's care: [facility] discussed with: Patient, Care Manager and Nursing Staff    Discussed:  Care Plan and D/C Planning    Prophylaxis:  Lovenox    Anticipated Disposition:  Home w/Family           ___________________________________________________    Attending Physician:   Marline De Luna MD

## 2022-05-30 NOTE — PROGRESS NOTES
Cassi Arellano M.D.  [phone]           GI PROGRESS NOTE        NAME: Darion Hanna   :  1955   MRN:  [id number]       Subjective:   Reports she continues with frequent bowel movements, although stools are pasty. Reports mild right groin pain. Objective: In NAD      VITALS:   Last 24hrs VS reviewed since prior progress note. Most recent are:  Visit Vitals  BP 94/62 (BP 1 Location: Right upper arm, BP Patient Position: At rest)   Pulse 85   Temp 98.9 °F (37.2 °C)   Resp 18   Ht 5' 1\" (1.549 m)   Wt 60.4 kg (133 lb 2.5 oz)   SpO2 92%   BMI 25.16 kg/m²       Intake/Output Summary (Last 24 hours) at 2022 1246  Last data filed at 2022 2240  Gross per 24 hour   Intake 1376.25 ml   Output    Net 1376.25 ml       PHYSICAL EXAM:  General: Alert, in no acute distress    HEENT: Anicteric sclerae. Lungs:            CTA Bilaterally. Heart:  Regular  rhythm,    Abdomen: Soft, Non distended, minimal tenderness RLQ  (+)Bowel sounds, no HSM  Extremities: No c/c/e  Neurologic:  CN 2-12 gi, Alert and oriented X 3. No acute neurological distress   Psych:   Good insight. Not anxious nor agitated. Lab Data Reviewed:   No results for input(s): WBC, HGB, HCT, PLT, HGBEXT, HCTEXT, PLTEXT in the last 72 hours. Recent Labs     22  0444      K 3.7      CO2 31   BUN 6   CREA 0.86   *   PHOS 3.6   CA 8.8     No results for input(s): AP, TBIL, TP, ALB, GLOB, GGT, AML, LPSE in the last 72 hours.     No lab exists for component: SGOT, GPT, AMYP, HLPSE    ________________________________________________________________________  Patient Active Problem List   Diagnosis Code    Nephrolithiasis N20.0    Hydronephrosis N13.30    Flank pain R10.9    Renal insufficiency N28.9    Bandemia D72.825    Hypokalemia E87.6    Dehydration E86.0    Colitis K52.9    Clostridium difficile diarrhea A04.72         Assessment and Plan:  Persistent diarrhea associated with C. Diff and history of UC proctitis. On fidaxomicin day 4. Continue with current management for now. Dr. Jed Loaiza will see her tomorrow.        Signed By: Yuliya Mcclure MD     5/30/2022  12:46 PM

## 2022-05-30 NOTE — PROGRESS NOTES
Bedside and Verbal shift change report given to Werner Pacheco RN (oncoming nurse) by Shai Lopez RN/Mally, student nurse (offgoing nurse). Report included the following information SBAR, Kardex, Intake/Output, MAR, Accordion, Recent Results and Med Rec Status.

## 2022-05-31 ENCOUNTER — APPOINTMENT (OUTPATIENT)
Dept: CT IMAGING | Age: 67
DRG: 660 | End: 2022-05-31
Attending: NURSE PRACTITIONER
Payer: MEDICARE

## 2022-05-31 LAB
APPEARANCE UR: CLEAR
BACTERIA URNS QL MICRO: NEGATIVE /HPF
BILIRUB UR QL: NEGATIVE
COLOR UR: ABNORMAL
EPITH CASTS URNS QL MICRO: ABNORMAL /LPF
ERYTHROCYTE [DISTWIDTH] IN BLOOD BY AUTOMATED COUNT: 14 % (ref 11.5–14.5)
GLUCOSE UR STRIP.AUTO-MCNC: NEGATIVE MG/DL
HCT VFR BLD AUTO: 37.3 % (ref 35–47)
HGB BLD-MCNC: 11.9 G/DL (ref 11.5–16)
HGB UR QL STRIP: ABNORMAL
HYALINE CASTS URNS QL MICRO: ABNORMAL /LPF (ref 0–2)
KETONES UR QL STRIP.AUTO: NEGATIVE MG/DL
LEUKOCYTE ESTERASE UR QL STRIP.AUTO: ABNORMAL
MCH RBC QN AUTO: 30.4 PG (ref 26–34)
MCHC RBC AUTO-ENTMCNC: 31.9 G/DL (ref 30–36.5)
MCV RBC AUTO: 95.2 FL (ref 80–99)
NITRITE UR QL STRIP.AUTO: NEGATIVE
NRBC # BLD: 0 K/UL (ref 0–0.01)
NRBC BLD-RTO: 0 PER 100 WBC
PH UR STRIP: 8 [PH] (ref 5–8)
PLATELET # BLD AUTO: 359 K/UL (ref 150–400)
PMV BLD AUTO: 10 FL (ref 8.9–12.9)
PROT UR STRIP-MCNC: 30 MG/DL
RBC # BLD AUTO: 3.92 M/UL (ref 3.8–5.2)
RBC #/AREA URNS HPF: >100 /HPF (ref 0–5)
SP GR UR REFRACTOMETRY: 1.01 (ref 1–1.03)
UA: UC IF INDICATED,UAUC: ABNORMAL
UROBILINOGEN UR QL STRIP.AUTO: 0.2 EU/DL (ref 0.2–1)
WBC # BLD AUTO: 8.7 K/UL (ref 3.6–11)
WBC URNS QL MICRO: ABNORMAL /HPF (ref 0–4)

## 2022-05-31 PROCEDURE — 74011250637 HC RX REV CODE- 250/637: Performed by: UROLOGY

## 2022-05-31 PROCEDURE — 74011250636 HC RX REV CODE- 250/636: Performed by: UROLOGY

## 2022-05-31 PROCEDURE — 74011250637 HC RX REV CODE- 250/637: Performed by: NURSE PRACTITIONER

## 2022-05-31 PROCEDURE — 65270000029 HC RM PRIVATE

## 2022-05-31 PROCEDURE — 97116 GAIT TRAINING THERAPY: CPT

## 2022-05-31 PROCEDURE — 36415 COLL VENOUS BLD VENIPUNCTURE: CPT

## 2022-05-31 PROCEDURE — 74176 CT ABD & PELVIS W/O CONTRAST: CPT

## 2022-05-31 PROCEDURE — 97161 PT EVAL LOW COMPLEX 20 MIN: CPT

## 2022-05-31 PROCEDURE — 97164 PT RE-EVAL EST PLAN CARE: CPT

## 2022-05-31 PROCEDURE — 74011250636 HC RX REV CODE- 250/636: Performed by: INTERNAL MEDICINE

## 2022-05-31 PROCEDURE — 85027 COMPLETE CBC AUTOMATED: CPT

## 2022-05-31 PROCEDURE — 81001 URINALYSIS AUTO W/SCOPE: CPT

## 2022-05-31 PROCEDURE — 74011250637 HC RX REV CODE- 250/637: Performed by: INTERNAL MEDICINE

## 2022-05-31 PROCEDURE — 87086 URINE CULTURE/COLONY COUNT: CPT

## 2022-05-31 RX ORDER — CALCIUM POLYCARBOPHIL 625 MG
1 TABLET ORAL DAILY
Status: DISCONTINUED | OUTPATIENT
Start: 2022-05-31 | End: 2022-06-02

## 2022-05-31 RX ORDER — HYDROMORPHONE HYDROCHLORIDE 1 MG/ML
0.5 INJECTION, SOLUTION INTRAMUSCULAR; INTRAVENOUS; SUBCUTANEOUS
Status: DISCONTINUED | OUTPATIENT
Start: 2022-05-31 | End: 2022-06-02

## 2022-05-31 RX ORDER — SODIUM CHLORIDE 9 MG/ML
75 INJECTION, SOLUTION INTRAVENOUS CONTINUOUS
Status: DISCONTINUED | OUTPATIENT
Start: 2022-05-31 | End: 2022-06-03 | Stop reason: HOSPADM

## 2022-05-31 RX ORDER — POLYETHYLENE GLYCOL 3350 17 G/17G
17 POWDER, FOR SOLUTION ORAL DAILY
Status: DISCONTINUED | OUTPATIENT
Start: 2022-05-31 | End: 2022-06-01

## 2022-05-31 RX ADMIN — OXYCODONE 5 MG: 5 TABLET ORAL at 01:34

## 2022-05-31 RX ADMIN — HYDROMORPHONE HYDROCHLORIDE 1 MG: 1 INJECTION, SOLUTION INTRAMUSCULAR; INTRAVENOUS; SUBCUTANEOUS at 11:26

## 2022-05-31 RX ADMIN — HYDROMORPHONE HYDROCHLORIDE 0.5 MG: 1 INJECTION, SOLUTION INTRAMUSCULAR; INTRAVENOUS; SUBCUTANEOUS at 22:47

## 2022-05-31 RX ADMIN — CALCIUM POLYCARBOPHIL 625 MG: 625 TABLET, FILM COATED ORAL at 13:11

## 2022-05-31 RX ADMIN — OXYCODONE 5 MG: 5 TABLET ORAL at 09:29

## 2022-05-31 RX ADMIN — ONDANSETRON 4 MG: 2 INJECTION INTRAMUSCULAR; INTRAVENOUS at 20:16

## 2022-05-31 RX ADMIN — FIDAXOMICIN 200 MG: 200 TABLET, FILM COATED ORAL at 08:20

## 2022-05-31 RX ADMIN — ENOXAPARIN SODIUM 40 MG: 100 INJECTION SUBCUTANEOUS at 08:20

## 2022-05-31 RX ADMIN — OXYCODONE 5 MG: 5 TABLET ORAL at 18:45

## 2022-05-31 RX ADMIN — ACETAMINOPHEN 650 MG: 325 TABLET ORAL at 00:00

## 2022-05-31 RX ADMIN — POLYETHYLENE GLYCOL 3350 17 G: 17 POWDER, FOR SOLUTION ORAL at 13:10

## 2022-05-31 RX ADMIN — MESALAMINE 1000 MG: 1000 SUPPOSITORY RECTAL at 22:47

## 2022-05-31 RX ADMIN — HYDROMORPHONE HYDROCHLORIDE 1 MG: 1 INJECTION, SOLUTION INTRAMUSCULAR; INTRAVENOUS; SUBCUTANEOUS at 02:41

## 2022-05-31 RX ADMIN — Medication 1 CAPSULE: at 08:20

## 2022-05-31 RX ADMIN — HYDROMORPHONE HYDROCHLORIDE 1 MG: 1 INJECTION, SOLUTION INTRAMUSCULAR; INTRAVENOUS; SUBCUTANEOUS at 07:00

## 2022-05-31 RX ADMIN — OXYCODONE 5 MG: 5 TABLET ORAL at 05:32

## 2022-05-31 RX ADMIN — OXYCODONE 5 MG: 5 TABLET ORAL at 13:13

## 2022-05-31 RX ADMIN — FIDAXOMICIN 200 MG: 200 TABLET, FILM COATED ORAL at 18:45

## 2022-05-31 RX ADMIN — HYDROMORPHONE HYDROCHLORIDE 1 MG: 1 INJECTION, SOLUTION INTRAMUSCULAR; INTRAVENOUS; SUBCUTANEOUS at 15:45

## 2022-05-31 RX ADMIN — SODIUM CHLORIDE 75 ML/HR: 9 INJECTION, SOLUTION INTRAVENOUS at 19:49

## 2022-05-31 NOTE — PROGRESS NOTES
Problem: Risk for Spread of Infection  Goal: Prevent transmission of infectious organism to others  Description: Prevent the transmission of infectious organisms to other patients, staff members, and visitors. Outcome: Progressing Towards Goal     Problem: Patient Education:  Go to Education Activity  Goal: Patient/Family Education  Outcome: Progressing Towards Goal     Problem: Falls - Risk of  Goal: *Absence of Falls  Description: Document Justin Felix Fall Risk and appropriate interventions in the flowsheet. Outcome: Progressing Towards Goal  Note: Fall Risk Interventions:            Medication Interventions: Patient to call before getting OOB,Teach patient to arise slowly    Elimination Interventions: Call light in reach              Problem: Patient Education: Go to Patient Education Activity  Goal: Patient/Family Education  Outcome: Progressing Towards Goal     Problem: Pain  Goal: *Control of Pain  Outcome: Progressing Towards Goal  Goal: *PALLIATIVE CARE:  Alleviation of Pain  Outcome: Progressing Towards Goal     Problem: Patient Education: Go to Patient Education Activity  Goal: Patient/Family Education  Outcome: Progressing Towards Goal     Problem:  Activity Intolerance  Goal: *Oxygen saturation during activity within specified parameters  Outcome: Progressing Towards Goal  Goal: *Able to remain out of bed as prescribed  Outcome: Progressing Towards Goal     Problem: Patient Education: Go to Patient Education Activity  Goal: Patient/Family Education  Outcome: Progressing Towards Goal     Problem: Nutrition Deficit  Goal: *Optimize nutritional status  Outcome: Progressing Towards Goal

## 2022-05-31 NOTE — PROGRESS NOTES
Rounded on patient. HR was elevated this am. All other VSS WNL. No needs at this time. Repeat Mews 2.

## 2022-05-31 NOTE — PROGRESS NOTES
Shaan Egan [facility] 79  [address] [facility], [address] [place] [address]  [phone]      Medical Progress Note      NAME:         Gaetano Valencia   :        1955  MRM:        [id number]    Date of service: 2022      Subjective: \"I'm still going to the bathroom a lot\"     Pt seen and examined. Reports still having multiple stools daily with copious gas    Objective:    Vital Signs:    Visit Vitals  BP (!) 88/62 (BP 1 Location: Right upper arm, BP Patient Position: At rest;Semi fowlers)   Pulse 93   Temp 99 °F (37.2 °C)   Resp 18   Ht 5' 1\" (1.549 m)   Wt 60.4 kg (133 lb 2.5 oz)   SpO2 93%   BMI 25.16 kg/m²          Intake/Output Summary (Last 24 hours) at 2022 1705  Last data filed at 2022 1631  Gross per 24 hour   Intake 1040 ml   Output    Net 1040 ml        Physical Examination:    General:   Weak looking and not in any distress   Eyes:   pink conjunctivae, PERRLA with no discharge. ENT:   no ottorrhea or rhinorrhea with dry mucous membranes  Pulm:  clear breath sounds without crackles or wheezes  Card:  has regular and normal S1, S2 without thrills, bruits or peripheral edema  Abd:  Soft, non-tender, non-distended, normoactive bowel sounds   Musc:  No cyanosis, clubbing, atrophy or deformities. Neuro: Awake and alert.  Generally a non focal exam.   Psych:  Has a good insight and is oriented x 3    Current Facility-Administered Medications   Medication Dose Route Frequency    calcium polycarbophiL (FIBERCON) tablet 625 mg  1 Tablet Oral DAILY    polyethylene glycol (MIRALAX) packet 17 g  17 g Oral DAILY    methen-m.blue-s.phos-phsal-hyo (URELLE) 81-10.8-40.8 mg tablet tab 1 Tablet  1 Tablet Oral QID PRN    fidaxomicin (DIFICID) tablet 200 mg  200 mg Oral BID    [Held by provider] pantoprazole (PROTONIX) tablet 40 mg  40 mg Oral DAILY    oxyCODONE IR (ROXICODONE) tablet 5 mg  5 mg Oral Q4H PRN    mesalamine (CANASA) suppository 1,000 mg  1 Suppository Rectal QHS    HYDROmorphone (DILAUDID) injection 1 mg  1 mg IntraVENous Q4H PRN    L.acidophilus-paracasei-S.thermophil-bifidobacter (RISAQUAD) 8 billion cell capsule  1 Capsule Oral DAILY    prochlorperazine (COMPAZINE) injection 5 mg  5 mg IntraVENous Q6H PRN    acetaminophen (TYLENOL) tablet 650 mg  650 mg Oral Q6H PRN    polyethylene glycol (MIRALAX) packet 17 g  17 g Oral DAILY PRN    ondansetron (ZOFRAN) injection 4 mg  4 mg IntraVENous Q6H PRN    enoxaparin (LOVENOX) injection 40 mg  40 mg SubCUTAneous DAILY        Laboratory data and review:    Recent Labs     05/31/22  0313   WBC 8.7   HGB 11.9   HCT 37.3        No results for input(s): NA, K, CL, CO2, GLU, BUN, CREA, CA, MG, PHOS, ALB, TBIL, ALT, INR, INREXT, INREXT in the last 72 hours. No lab exists for component: SGOT  No components found for: [place]    Assessment and Plan:  74 yo hx of ulcerative colitis, presented w/ sepsis, C-diff colitis, L hydronephrosis w/ obstructing stone, Peggy     Clostridium difficile diarrhea (5/22/2022) POA: CT without e/o colitis. At this point suspect pt now has overflow diarrhea. Also uncertain that her account of quantity of stools is accurate therefore will place stool count   -continue Florastor   -continue fidoxamicin  -stool count   -considering CT today; start Miralax     L hydronephrosis from an obstructing stone POA: CT abdomen and pelvis had shown a left proximal ureteral 10 mm calculous.  Seen by Urology and she is sp cysto and L ureteral stent on 05/24  -considering fever; check UA      Ulcerative Colitis POA:   -on mesalamine     Insomnia: this is a chronic issue  -xanax qHS     Complicated UTI due to obstructing stone POA:  -initial urine culture negative   -repeat UA     Fever - Tmax 100.5 overnight  -check procalcitonin, UA   -may need CXR, repeat COVID if persists   -IVF's    Total time spent for the patient's care: 35 minutes Care Plan discussed with: Patient, Care Manager and Nursing Staff    Discussed:  Care Plan and D/C Planning    Prophylaxis:  Lovenox    Anticipated Disposition:  Home w/Family           ___________________________________________________    Attending Physician:   Marychuy Marlow MD

## 2022-05-31 NOTE — PROGRESS NOTES
Pt remains IP, followed by GI for persistent symptoms, + CDiff  Had scheduled OP f/u with [facility] post stent placement today- I have canceled given her IP status. Will monitor peripherally to ensure f/u appt is arranged closer to anticipated discharge date. Please call if needed.   Marva Smith NP

## 2022-05-31 NOTE — PROGRESS NOTES
GI progress note    CT results reviewed. No colitis noted on CT but has moderate amount of stool. Unsure if diarrhea she reports is overflow diarrhea. Consider adding daily Miralax. Will resume Fibercon daily.

## 2022-05-31 NOTE — PROGRESS NOTES
Progress Note  Date:2022       Room:[facility]  Patient Name:Betsy Stone     YOB: 1955     Age:67 y.o. Subjective    Subjective:  Symptoms:  No diarrhea. Diet:  Adequate intake. She reports  nausea. No vomiting. Pain:  She complains of pain that is moderate. Review of Systems   Constitutional: Positive for fatigue and fever. Negative for appetite change. Gastrointestinal: Positive for abdominal pain and nausea. Negative for blood in stool, diarrhea and vomiting. Skin: Negative for pallor. Objective         Vitals Last 24 Hours:  TEMPERATURE:  Temp  Av.6 °F (37.6 °C)  Min: 98.9 °F (37.2 °C)  Max: 100.5 °F (38.1 °C)  RESPIRATIONS RANGE: Resp  Av.2  Min: 18  Max: 20  PULSE OXIMETRY RANGE: SpO2  Av.8 %  Min: 90 %  Max: 94 %  PULSE RANGE: Pulse  Av.2  Min: 85  Max: 110  BLOOD PRESSURE RANGE: Systolic (84RFJ), EP , Min:94 , UPS:600   ; Diastolic (58GPS), HZQ:81, Min:62, Max:81    I/O (24Hr): Intake/Output Summary (Last 24 hours) at 2022 0849  Last data filed at 2022 0519  Gross per 24 hour   Intake 560 ml   Output    Net 560 ml     Objective:  General Appearance:  Comfortable. Vital signs: (most recent): Blood pressure 98/68, pulse (!) 110, temperature 98.9 °F (37.2 °C), resp. rate 18, height 5' 1\" (1.549 m), weight 60.4 kg (133 lb 2.5 oz), SpO2 91 %. Fever. Output: Producing stool. HEENT: Normal HEENT exam.    Lungs:  Normal effort and normal respiratory rate. Breath sounds clear to auscultation. Heart: Normal rate. Regular rhythm. S1 normal and S2 normal.  No murmur. Abdomen: Abdomen is soft and non-distended. Bowel sounds are normal.   There is right lower quadrant tenderness. Extremities: Normal range of motion. There is no dependent edema. Pulses: Distal pulses are intact. Neurological: Patient is alert and oriented to person, place and time.     Pupils:  Pupils are equal, round, and reactive to light. Skin:  Warm and dry. Labs/Imaging/Diagnostics    Labs:  CBC:  Recent Labs     05/31/22  0313   WBC 8.7   RBC 3.92   HGB 11.9   HCT 37.3   MCV 95.2   RDW 14.0        CHEMISTRIES:  No results for input(s): NA, K, CL, CO2, BUN, CA, PHOS, MG in the last 72 hours. No lab exists for component: CREATININE, GLUCOSEPT/INR:No results for input(s): INR, INREXT, INREXT in the last 72 hours. No lab exists for component: PROTIME  APTT:No results for input(s): APTT in the last 72 hours. LIVER PROFILE:  No results for input(s): AST, ALT in the last 72 hours. No lab exists for component: BILIDIR, BILITOT, ALKPHOS  Lab Results   Component Value Date/Time    ALT (SGPT) 10 (L) 05/24/2022 12:23 AM    AST (SGOT) 10 (L) 05/24/2022 12:23 AM    Alk. phosphatase 55 05/24/2022 12:23 AM    Bilirubin, total 0.2 05/24/2022 12:23 AM       Imaging Last 24 Hours:  No results found. Assessment//Plan   Principal Problem:    Clostridium difficile diarrhea (5/22/2022)    Active Problems:    Nephrolithiasis (5/26/2021)      Hydronephrosis (5/26/2021)      Flank pain (5/26/2021)      Renal insufficiency (5/26/2021)      Bandemia (5/21/2022)      Hypokalemia (5/21/2022)      Dehydration (5/21/2022)      Colitis (5/21/2022)      Assessment:  (57-year-old female who in March was diagnosed with ulcerative proctitis and follows with my partner Dr. Gricel Ruano.  She was initially placed on prednisone taper and mesalamine suppositories. More recently she notes worsening GI symptoms which include abdominal pain, nausea, and diarrhea. She tells me she has had 15 bowel movements already today, most of which are watery. Blood noted as well. She is not anemic though inflammatory markers are elevated. Stool positive for C. Difficile, she has been started on oral vancomycin and IV Flagyl.   CT scan shows diffuse thickening up to the rectosigmoid junction as well as some thickening within the transverse colon. Last colonoscopy 3/2022 by Dr. Flo Sterling with ulcerative proctitis. 5/23/2022: Reports ongoing diarrhea, unchanged by vancomycin. No rectal bleeding. Mild lower abdominal pain that is worse in the right lower quadrant on exam.  No nausea or vomiting. Continues to run a fever, T-max 100.4.    5/24/2022: Vancomycin increased to 500 mg yesterday. Diarrhea seems less. Had diarrhea first thing this morning and reports rectal bleeding that she says has been going on for a year although yesterday she said she was not having rectal bleeding. Ulcerative proctitis diagnosed 3/2022. She says the [facility] suppositories have never helped and she stopped taking them at home. Abdominal pain is less but persists to some degree. Some nausea this morning. For cystoscopy with ureteral stent placement today. 5/25/2022: Stool more pasty, less watery. Not as much bleeding. Refused suppository last night because she thought it was the Anusol she has been taking although we discussed started mesalamine suppository yesterday. Less pain but more bloating. No nausea or vomiting. Tolerating diet. 5/26/2022: Was having some improvement with her diarrhea but reports multiple episodes of diarrhea this morning and that she has had a \"bad morning\". Had some mucus with a small amount of blood mixed in with her stool. She is concerned about fluid on her abdomen which she says has been there for months. Review of the CT does not show any fluid in her abdomen so I suspect this is ongoing issues with bloating. She has had some nausea but no vomiting. Has not been able to eat today. Tmax 99.2.     5/27/2022: Ongoing diarrhea, 9 episodes documented. No rectal bleeding. Hungry this morning, breakfast is late. Low grade fever, 99.1. No nausea or vomiting. Abdominal pain somewhat better. 5/31/2022: Just had stool and had saved sample. It appears very loose but not watery. No overt signs of GIB. Tmax 100.5.  Seems more TTP in RLQ than last week. Concerned about going home without clear improvement. On day 5 of Dificid after failed treatment with 4 days of vancomycin. Nausea and vomiting yesterday that she says is because of worsening abdominal pain. ). Plan:   (- Ongoing fever and abdominal pain. Stools are loose but not watery. Will recheck CT. Continue Dificid.    - Continue Canasa suppository at HS ).        Electronically signed by Eloy Chang NP on 5/31/2022 at 3:58 PM  I have interviewed and examined patient with addendum to note above and formulation care plan to reflect my evaluation    Ricky Martinez M.D.

## 2022-05-31 NOTE — PROGRESS NOTES
PHYSICAL THERAPY RE-EVALUATION/DISCHARGE  Patient: Danii Neil (43 y.o. female)  Date: 5/31/2022  Primary Diagnosis: Colitis [K52.9]  Procedure(s) (LRB):  CYSTOSCOPY WITH left ureteral RETROGRADES LEFT STENT INSERTION (Left) 7 Days Post-Op   Precautions:   Contact (CDiff)      ASSESSMENT  Based on the objective data described below, the patient presents with independent flat surface gait, transfers, and bed mobility on day 10 of admission with C diff colitis and complicated UTI with ureteral stone no POD 7 s/p cystoscopy with stent placement. Pt initially evaluated by PT 5/23/22 and discharged at independent functional level. She remains at independent level and reports frequent mobility out of bed. She tolerates activity without complaints and presents at low risk of falls. Functional Outcome Measure: The patient scored 28 on the Tinetti outcome measure which is indicative of low fall risk. Other factors to consider for discharge: none additional     Further skilled acute physical therapy is not indicated at this time. PLAN :  Recommendation for discharge: (in order for the patient to meet his/her long term goals)  No skilled physical therapy/ follow up rehabilitation needs identified at this time. This discharge recommendation:  Has not yet been discussed the attending provider and/or case management    IF patient discharges home will need the following DME: none       SUBJECTIVE:   Patient stated I'm moving around fine.     Pt received supine, agreeable to PT and cleared by RN.     OBJECTIVE DATA SUMMARY:   HISTORY:    Past Medical History:   Diagnosis Date    C. difficile colitis     Hx of cervical cancer     Hx of Lyme disease     Hx of skin cancer, basal cell     Kidney stones      Past Surgical History:   Procedure Laterality Date    HX GI      isaias colectomy    HX GYN      partial hysterectomy    HX GYN      c section    HX ORTHOPAEDIC      RIGHT shoulder surgery    HX ORTHOPAEDIC      RIGHT foot surgery    NEUROLOGICAL PROCEDURE UNLISTED      lumber surgery       Prior level of function: independent at community level  Personal factors and/or comorbidities impacting plan of care: as above    Home Situation  Home Environment: Private residence  # Steps to Enter: 4  Rails to Enter: Yes  One/Two Story Residence: One story  Living Alone: No  Support Systems: Child(randolph),Friend/Neighbor  Patient Expects to be Discharged to[de-identified] Home  Current DME Used/Available at Home: None  Tub or Shower Type: Tub/Shower combination    EXAMINATION/PRESENTATION/DECISION MAKING:   Critical Behavior:  Neurologic State: Alert  Orientation Level: Oriented X4  Cognition: Appropriate decision making  Safety/Judgement: Awareness of environment  Hearing: Auditory  Auditory Impairment: None  Skin:  LE exposed skin intact  Edema: none noted LEs  Range Of Motion:  AROM: Within functional limits                       Strength:    Strength: Within functional limits                    Tone & Sensation:   Tone: Normal              Sensation: Intact               Coordination:  Coordination: Within functional limits       Functional Mobility:  Bed Mobility:     Supine to Sit: Independent  Sit to Supine: Independent  Scooting: Independent  Transfers:  Sit to Stand: Independent  Stand to Sit: Independent                       Balance:   Sitting: Intact  Standing: Intact  Ambulation/Gait Training:  Distance (ft): 70 Feet (ft)     Ambulation - Level of Assistance: Independent                                             Changes direction, negotiates obstacles.       Functional Measure:  Tinetti test:    Sitting Balance: 1  Arises: 2  Attempts to Rise: 2  Immediate Standing Balance: 2  Standing Balance: 2  Nudged: 2  Eyes Closed: 1  Turn 360 Degrees - Continuous/Discontinuous: 1  Turn 360 Degrees - Steady/Unsteady: 1  Sitting Down: 2  Balance Score: 16 Balance total score  Indication of Gait: 1  R Step Length/Height: 1  L Step Length/Height: 1  R Foot Clearance: 1  L Foot Clearance: 1  Step Symmetry: 1  Step Continuity: 1  Path: 2  Trunk: 2  Walking Time: 1  Gait Score: 12 Gait total score  Total Score: 28/28 Overall total score         Tinetti Tool Score Risk of Falls  <19 = High Fall Risk  19-24 = Moderate Fall Risk  25-28 = Low Fall Risk  Manjinderetti ME. Performance-Oriented Assessment of Mobility Problems in Elderly Patients. [facility] 66; [id number]. (Scoring Description: PT Bulletin Feb. 10, 1993)    Older adults: Chencho Calderon et al, 2009; n = 1000 [facility] elderly evaluated with ABC, LEXIE, ADL, and IADL)  · Mean LEXIE score for males aged 69-68 years = 26.21(3.40)  · Mean LEXIE score for females age 69-68 years = 25.16(4.30)  · Mean LEXIE score for males over 80 years = 23.29(6.02)  · Mean LEXIE score for females over 80 years = 17.20(8.32)            Physical Therapy Evaluation Charge Determination   History Examination Presentation Decision-Making   HIGH Complexity :3+ comorbidities / personal factors will impact the outcome/ POC  HIGH Complexity : 4+ Standardized tests and measures addressing body structure, function, activity limitation and / or participation in recreation  LOW Complexity : Stable, uncomplicated  Other outcome measures tinett  LOW       Based on the above components, the patient evaluation is determined to be of the following complexity level: LOW     Pain Rating:  Denies pain    Activity Tolerance:   Good      After treatment patient left in no apparent distress:   Supine in bed and Call bell within reach, side rails x 3      COMMUNICATION/EDUCATION:   The patients plan of care was discussed with: Registered nurse. Fall prevention education was provided and the patient/caregiver indicated understanding., Patient/family have participated as able in goal setting and plan of care. and Patient/family agree to work toward stated goals and plan of care.     Thank you for this referral.  Jose Zaman, PT, DPT   Time Calculation: 18 mins

## 2022-05-31 NOTE — PROGRESS NOTES
5/31/2022   CARE MANAGEMENT NOTE:  CM reviewed EMR and handoff received from previous  Ceci Monahan). Pt was admitted with C-diff, left hydronephrosis, ulcerative colitis. Reportedly, pt resides with a friend Russ Santiago. Son, Kin Ross ([phone]) is the primary family contact. RUR 9%; LOS 9 days    Transition Plan of Care:  1. Urology, GI following for medical management  2. Plan is for pt to return home without any homecare indicated (pt ambulated 25 feet on 5/23 and no further rehab services indicated  3. Outpt f/u  4. Friend or son will transport pt home    CM will continue to follow pt until discharged.   Bernadine

## 2022-05-31 NOTE — PROGRESS NOTES
Bedside and Verbal shift change report given to Jhonny Amin (oncoming nurse) by Tita Vences RN (offgoing nurse). Report included the following information SBAR, Kardex, Intake/Output, MAR, Accordion, Recent Results and Med Rec Status.

## 2022-06-01 LAB
ANION GAP SERPL CALC-SCNC: 5 MMOL/L (ref 5–15)
BASOPHILS # BLD: 0 K/UL (ref 0–0.1)
BASOPHILS NFR BLD: 1 % (ref 0–1)
BUN SERPL-MCNC: 6 MG/DL (ref 6–20)
BUN/CREAT SERPL: 7 (ref 12–20)
CALCIUM SERPL-MCNC: 8.7 MG/DL (ref 8.5–10.1)
CHLORIDE SERPL-SCNC: 106 MMOL/L (ref 97–108)
CO2 SERPL-SCNC: 29 MMOL/L (ref 21–32)
CREAT SERPL-MCNC: 0.91 MG/DL (ref 0.55–1.02)
DIFFERENTIAL METHOD BLD: ABNORMAL
EOSINOPHIL # BLD: 0.1 K/UL (ref 0–0.4)
EOSINOPHIL NFR BLD: 1 % (ref 0–7)
ERYTHROCYTE [DISTWIDTH] IN BLOOD BY AUTOMATED COUNT: 13.8 % (ref 11.5–14.5)
GLUCOSE SERPL-MCNC: 93 MG/DL (ref 65–100)
HCT VFR BLD AUTO: 39.3 % (ref 35–47)
HGB BLD-MCNC: 12.2 G/DL (ref 11.5–16)
IMM GRANULOCYTES # BLD AUTO: 0 K/UL (ref 0–0.04)
IMM GRANULOCYTES NFR BLD AUTO: 1 % (ref 0–0.5)
LYMPHOCYTES # BLD: 1.3 K/UL (ref 0.8–3.5)
LYMPHOCYTES NFR BLD: 15 % (ref 12–49)
MCH RBC QN AUTO: 30.7 PG (ref 26–34)
MCHC RBC AUTO-ENTMCNC: 31 G/DL (ref 30–36.5)
MCV RBC AUTO: 98.7 FL (ref 80–99)
MONOCYTES # BLD: 0.9 K/UL (ref 0–1)
MONOCYTES NFR BLD: 11 % (ref 5–13)
NEUTS SEG # BLD: 5.8 K/UL (ref 1.8–8)
NEUTS SEG NFR BLD: 71 % (ref 32–75)
NRBC # BLD: 0 K/UL (ref 0–0.01)
NRBC BLD-RTO: 0 PER 100 WBC
PLATELET # BLD AUTO: 386 K/UL (ref 150–400)
PMV BLD AUTO: 10.2 FL (ref 8.9–12.9)
POTASSIUM SERPL-SCNC: 5.3 MMOL/L (ref 3.5–5.1)
PROCALCITONIN SERPL-MCNC: 0.05 NG/ML
RBC # BLD AUTO: 3.98 M/UL (ref 3.8–5.2)
SODIUM SERPL-SCNC: 140 MMOL/L (ref 136–145)
WBC # BLD AUTO: 8.1 K/UL (ref 3.6–11)

## 2022-06-01 PROCEDURE — 74011250637 HC RX REV CODE- 250/637: Performed by: INTERNAL MEDICINE

## 2022-06-01 PROCEDURE — 65270000029 HC RM PRIVATE

## 2022-06-01 PROCEDURE — 85025 COMPLETE CBC W/AUTO DIFF WBC: CPT

## 2022-06-01 PROCEDURE — 74011250637 HC RX REV CODE- 250/637: Performed by: UROLOGY

## 2022-06-01 PROCEDURE — 74011250637 HC RX REV CODE- 250/637: Performed by: NURSE PRACTITIONER

## 2022-06-01 PROCEDURE — 74011250636 HC RX REV CODE- 250/636: Performed by: UROLOGY

## 2022-06-01 PROCEDURE — 80048 BASIC METABOLIC PNL TOTAL CA: CPT

## 2022-06-01 PROCEDURE — 74011250636 HC RX REV CODE- 250/636: Performed by: INTERNAL MEDICINE

## 2022-06-01 PROCEDURE — 84145 PROCALCITONIN (PCT): CPT

## 2022-06-01 PROCEDURE — 36415 COLL VENOUS BLD VENIPUNCTURE: CPT

## 2022-06-01 RX ORDER — POLYETHYLENE GLYCOL 3350 17 G/17G
17 POWDER, FOR SOLUTION ORAL 2 TIMES DAILY
Status: DISCONTINUED | OUTPATIENT
Start: 2022-06-01 | End: 2022-06-02

## 2022-06-01 RX ADMIN — OXYCODONE 5 MG: 5 TABLET ORAL at 17:19

## 2022-06-01 RX ADMIN — Medication 1 CAPSULE: at 09:17

## 2022-06-01 RX ADMIN — FIDAXOMICIN 200 MG: 200 TABLET, FILM COATED ORAL at 09:17

## 2022-06-01 RX ADMIN — CALCIUM POLYCARBOPHIL 625 MG: 625 TABLET, FILM COATED ORAL at 09:17

## 2022-06-01 RX ADMIN — ENOXAPARIN SODIUM 40 MG: 100 INJECTION SUBCUTANEOUS at 09:18

## 2022-06-01 RX ADMIN — NALOXEGOL OXALATE 12.5 MG: 12.5 TABLET, FILM COATED ORAL at 23:29

## 2022-06-01 RX ADMIN — POLYETHYLENE GLYCOL 3350 17 G: 17 POWDER, FOR SOLUTION ORAL at 09:18

## 2022-06-01 RX ADMIN — OXYCODONE 5 MG: 5 TABLET ORAL at 00:40

## 2022-06-01 RX ADMIN — PROCHLORPERAZINE EDISYLATE 5 MG: 5 INJECTION INTRAMUSCULAR; INTRAVENOUS at 00:40

## 2022-06-01 RX ADMIN — HYDROMORPHONE HYDROCHLORIDE 0.5 MG: 1 INJECTION, SOLUTION INTRAMUSCULAR; INTRAVENOUS; SUBCUTANEOUS at 22:36

## 2022-06-01 RX ADMIN — SODIUM CHLORIDE 75 ML/HR: 9 INJECTION, SOLUTION INTRAVENOUS at 19:40

## 2022-06-01 RX ADMIN — HYDROMORPHONE HYDROCHLORIDE 0.5 MG: 1 INJECTION, SOLUTION INTRAMUSCULAR; INTRAVENOUS; SUBCUTANEOUS at 11:00

## 2022-06-01 RX ADMIN — MESALAMINE 1000 MG: 1000 SUPPOSITORY RECTAL at 23:29

## 2022-06-01 RX ADMIN — HYDROMORPHONE HYDROCHLORIDE 0.5 MG: 1 INJECTION, SOLUTION INTRAMUSCULAR; INTRAVENOUS; SUBCUTANEOUS at 06:18

## 2022-06-01 RX ADMIN — FIDAXOMICIN 200 MG: 200 TABLET, FILM COATED ORAL at 17:19

## 2022-06-01 NOTE — PROGRESS NOTES
Bedside and Verbal shift change report given to Santosh Fong RN (oncoming nurse) by Ced Hamm RN (offgoing nurse). Report included the following information SBAR, Kardex and ED Summary.

## 2022-06-01 NOTE — PROGRESS NOTES
Shaan Egan [facility] [address]  [address] [facility], [address] [place] [address]  [phone]      Medical Progress Note      NAME:         Senia Weinberg   :        1955  MRM:        [id number]    Date of service: 2022      Subjective: \"I'm okay\"     Pt seen and examined. CT results reviewed and aware of stool volume in colon and overflow diarrhea. Understands need for Miralax    Objective:    Vital Signs:    Visit Vitals  BP 99/66 (BP 1 Location: Right upper arm, BP Patient Position: At rest)   Pulse 97   Temp 99.2 °F (37.3 °C)   Resp 16   Ht 5' 1\" (1.549 m)   Wt 60.4 kg (133 lb 2.5 oz)   SpO2 93%   BMI 25.16 kg/m²          Intake/Output Summary (Last 24 hours) at 2022 1613  Last data filed at 2022 1155  Gross per 24 hour   Intake 1960 ml   Output    Net 1960 ml        Physical Examination:    General:   Weak looking and not in any distress   Eyes:   pink conjunctivae, PERRLA with no discharge. ENT:   no ottorrhea or rhinorrhea with dry mucous membranes  Pulm:  clear breath sounds without crackles or wheezes  Card:  has regular and normal S1, S2 without thrills, bruits or peripheral edema  Abd:  Soft, non-tender, non-distended, normoactive bowel sounds   Musc:  No cyanosis, clubbing, atrophy or deformities. Neuro: Awake and alert.  Generally a non focal exam.   Psych:  Has a good insight and is oriented x 3    Current Facility-Administered Medications   Medication Dose Route Frequency    polyethylene glycol (MIRALAX) packet 17 g  17 g Oral BID    calcium polycarbophiL (FIBERCON) tablet 625 mg  1 Tablet Oral DAILY    0.9% sodium chloride infusion  75 mL/hr IntraVENous CONTINUOUS    HYDROmorphone (DILAUDID) syringe 0.5 mg  0.5 mg IntraVENous Q6H PRN    methen-m.blue-s.phos-phsal-hyo (URELLE) 81-10.8-40.8 mg tablet tab 1 Tablet  1 Tablet Oral QID PRN    fidaxomicin (DIFICID) tablet 200 mg  200 mg Oral BID    [Held by provider] pantoprazole (PROTONIX) tablet 40 mg  40 mg Oral DAILY    oxyCODONE IR (ROXICODONE) tablet 5 mg  5 mg Oral Q4H PRN    mesalamine (CANASA) suppository 1,000 mg  1 Suppository Rectal QHS    L.acidophilus-paracasei-S.thermophil-bifidobacter (RISAQUAD) 8 billion cell capsule  1 Capsule Oral DAILY    prochlorperazine (COMPAZINE) injection 5 mg  5 mg IntraVENous Q6H PRN    acetaminophen (TYLENOL) tablet 650 mg  650 mg Oral Q6H PRN    polyethylene glycol (MIRALAX) packet 17 g  17 g Oral DAILY PRN    ondansetron (ZOFRAN) injection 4 mg  4 mg IntraVENous Q6H PRN    enoxaparin (LOVENOX) injection 40 mg  40 mg SubCUTAneous DAILY        Laboratory data and review:    Recent Labs     06/01/22  0840 05/31/22  0313   WBC 8.1 8.7   HGB 12.2 11.9   HCT 39.3 37.3    359     Recent Labs     06/01/22  0225      K 5.3*      CO2 29   GLU 93   BUN 6   CREA 0.91   CA 8.7     No components found for: GLPOC    Assessment and Plan:  76 yo hx of ulcerative colitis, presented w/ sepsis, C-diff colitis, L hydronephrosis w/ obstructing stone, Peggy     Clostridium difficile diarrhea (5/22/2022) POA: CT without e/o colitis. At this point suspect pt now has overflow diarrhea. Also uncertain that her account of quantity of stools is accurate therefore will place stool count   -continue Florastor   -continue fidoxamicin  -CT showing stool in colon so suspect now actually has overflow diarrhea from constipation likely 2/2 opiates; little improvement with Miralax. Add Movantik as suspect some component of opiate induced constipation considering heavy opiate use while here. L hydronephrosis from an obstructing stone POA: CT abdomen and pelvis had shown a left proximal ureteral 10 mm calculous.  Seen by Urology and she is sp cysto and L ureteral stent on 05/24  -UA with blood and few WBCs; f/u cultures      Ulcerative Colitis POA:   -on mesalamine     Insomnia: this is a chronic issue  -xanax qHS     Complicated UTI due to obstructing stone POA:  -repeat UA pending     Fever   -procalcitonin low   -awaiting urine culture though no bacteria     Total time spent for the patient's care: 35 minutes                   Care Plan discussed with: Patient, Care Manager and Nursing Staff    Discussed:  Care Plan and D/C Planning    Prophylaxis:  Lovenox    Anticipated Disposition:  Home w/Family           ___________________________________________________    Attending Physician:   Felicia Romero MD

## 2022-06-01 NOTE — PROGRESS NOTES
Bedside and Verbal shift change report given to Tracee Muller (oncoming nurse) by Britney Acosta (offgoing nurse). Report included the following information SBAR, Kardex, Intake/Output, MAR and Recent Results.

## 2022-06-01 NOTE — PROGRESS NOTES
Occupational therapy note:  Orders received,  Chart reviewed. Patient seen by this therapist on 5/23/2022 and noted no OT needs. Patient now with recent procedure and new orders. Patient seen by PT yesterday and remains independent. Patient does not require new OT evaluation. Will sign off. Danuta Scott MS OTR/L

## 2022-06-01 NOTE — PROGRESS NOTES
Progress Note  Date:2022       Room:[facility]  Patient Name:Betsy Licea     YOB: 1955     Age:67 y.o. Subjective    Subjective:  Symptoms:  She reports diarrhea. Diet:  Adequate intake. No nausea or vomiting. Pain:  She complains of pain that is mild. Review of Systems   Constitutional: Positive for fatigue and fever. Negative for appetite change. Gastrointestinal: Positive for abdominal pain and diarrhea. Negative for blood in stool, nausea and vomiting. Skin: Negative for pallor. Objective         Vitals Last 24 Hours:  TEMPERATURE:  Temp  Av.8 °F (37.1 °C)  Min: 98.4 °F (36.9 °C)  Max: 99.2 °F (37.3 °C)  RESPIRATIONS RANGE: Resp  Av.6  Min: 18  Max: 20  PULSE OXIMETRY RANGE: SpO2  Av.7 %  Min: 90 %  Max: 96 %  PULSE RANGE: Pulse  Av.3  Min: 92  Max: 101  BLOOD PRESSURE RANGE: Systolic (30SZE), SPZ:877 , Min:88 , WCM:982   ; Diastolic (47TPN), FJB:64, Min:62, Max:80    I/O (24Hr): Intake/Output Summary (Last 24 hours) at 2022 0850  Last data filed at 2022 0000  Gross per 24 hour   Intake 1168.75 ml   Output    Net 1168.75 ml     Objective:  General Appearance:  Comfortable. Vital signs: (most recent): Blood pressure 130/70, pulse 98, temperature 99.2 °F (37.3 °C), resp. rate 20, height 5' 1\" (1.549 m), weight 60.4 kg (133 lb 2.5 oz), SpO2 90 %. Fever. Output: Producing stool. HEENT: Normal HEENT exam.    Lungs:  Normal effort and normal respiratory rate. Breath sounds clear to auscultation. Heart: Normal rate. Regular rhythm. S1 normal and S2 normal.  No murmur. Abdomen: Abdomen is soft and non-distended. Bowel sounds are normal.   There is right lower quadrant tenderness. Extremities: Normal range of motion. There is no dependent edema. Pulses: Distal pulses are intact. Neurological: Patient is alert and oriented to person, place and time.     Pupils:  Pupils are equal, round, and reactive to light. Skin:  Warm and dry. Labs/Imaging/Diagnostics    Labs:  CBC:  Recent Labs     05/31/22  0313   WBC 8.7   RBC 3.92   HGB 11.9   HCT 37.3   MCV 95.2   RDW 14.0        CHEMISTRIES:  Recent Labs     06/01/22  0225      K 5.3*      CO2 29   BUN 6   CA 8.7   PT/INR:No results for input(s): INR, INREXT, INREXT in the last 72 hours. No lab exists for component: PROTIME  APTT:No results for input(s): APTT in the last 72 hours. LIVER PROFILE:  No results for input(s): AST, ALT in the last 72 hours. No lab exists for component: BILIDIR, BILITOT, ALKPHOS  Lab Results   Component Value Date/Time    ALT (SGPT) 10 (L) 05/24/2022 12:23 AM    AST (SGOT) 10 (L) 05/24/2022 12:23 AM    Alk. phosphatase 55 05/24/2022 12:23 AM    Bilirubin, total 0.2 05/24/2022 12:23 AM       Imaging Last 24 Hours:  CT ABD PELV WO CONT    Result Date: 5/31/2022  INDICATION: Clostridium difficile diarrhea, rectal bleeding, abdominal pain. Fever. Exam: Noncontrast CT of the abdomen and pelvis is performed with 5 mm collimation. Sagittal and coronal reformatted images were also performed. CT dose reduction was achieved through the use of a standardized protocol tailored for this examination and automatic exposure control for dose modulation. Direct comparison is made to prior CT dated May 21, 2022. FINDINGS: There is minimal bibasilar scarring. Abdomen: Liver: The liver is normal on noncontrast images. Spleen: The spleen is normal on noncontrast images. Adrenals: The adrenals are normal on noncontrast images. Pancreas: The pancreas is normal on noncontrast images. Gallbladder: The gallbladder is normal on noncontrast images. Kidneys: There is a left nephroureteral stent which extends from the left renal collecting system to the urinary bladder. There is no hydronephrosis or hydroureter. No renal, ureteral bladder calculus is visualized. Bowel: No thickened or dilated loop of large or small bowel seen. Anastomotic chain sutures are noted within the mid descending colon. There is a moderate amount of stool in the colon. Appendix: The appendix is normal. Pelvis: Urinary bladder is partially filled and grossly normal. Miscellaneous: There is no free intraperitoneal gas or fluid. There is no focal fluid collection to suggest abscess. The uterus is absent. 1. No hydronephrosis. 2. No obstruction, ileus or perforation. No thickened colon. Moderate amount of stool throughout the colon. Assessment//Plan   Principal Problem:    Clostridium difficile diarrhea (5/22/2022)    Active Problems:    Nephrolithiasis (5/26/2021)      Hydronephrosis (5/26/2021)      Flank pain (5/26/2021)      Renal insufficiency (5/26/2021)      Bandemia (5/21/2022)      Hypokalemia (5/21/2022)      Dehydration (5/21/2022)      Colitis (5/21/2022)      Assessment:  (50-year-old female who in March was diagnosed with ulcerative proctitis and follows with my partner Dr. Lacey Hoskins.  She was initially placed on prednisone taper and mesalamine suppositories. More recently she notes worsening GI symptoms which include abdominal pain, nausea, and diarrhea. She tells me she has had 15 bowel movements already today, most of which are watery. Blood noted as well. She is not anemic though inflammatory markers are elevated. Stool positive for C. Difficile, she has been started on oral vancomycin and IV Flagyl. CT scan shows diffuse thickening up to the rectosigmoid junction as well as some thickening within the transverse colon. Last colonoscopy 3/2022 by Dr. Fatoumata Lebron with ulcerative proctitis. 5/23/2022: Reports ongoing diarrhea, unchanged by vancomycin. No rectal bleeding. Mild lower abdominal pain that is worse in the right lower quadrant on exam.  No nausea or vomiting. Continues to run a fever, T-max 100.4.    5/24/2022: Vancomycin increased to 500 mg yesterday. Diarrhea seems less.  Had diarrhea first thing this morning and reports rectal bleeding that she says has been going on for a year although yesterday she said she was not having rectal bleeding. Ulcerative proctitis diagnosed 3/2022. She says the [facility] suppositories have never helped and she stopped taking them at home. Abdominal pain is less but persists to some degree. Some nausea this morning. For cystoscopy with ureteral stent placement today. 5/25/2022: Stool more pasty, less watery. Not as much bleeding. Refused suppository last night because she thought it was the Anusol she has been taking although we discussed started mesalamine suppository yesterday. Less pain but more bloating. No nausea or vomiting. Tolerating diet. 5/26/2022: Was having some improvement with her diarrhea but reports multiple episodes of diarrhea this morning and that she has had a \"bad morning\". Had some mucus with a small amount of blood mixed in with her stool. She is concerned about fluid on her abdomen which she says has been there for months. Review of the CT does not show any fluid in her abdomen so I suspect this is ongoing issues with bloating. She has had some nausea but no vomiting. Has not been able to eat today. Tmax 99.2.     5/27/2022: Ongoing diarrhea, 9 episodes documented. No rectal bleeding. Hungry this morning, breakfast is late. Low grade fever, 99.1. No nausea or vomiting. Abdominal pain somewhat better. 5/31/2022: Just had stool and had saved sample. It appears very loose but not watery. No overt signs of GIB. Tmax 100.5. Seems more TTP in RLQ than last week. Concerned about going home without clear improvement. On day 5 of Dificid after failed treatment with 4 days of vancomycin. Nausea and vomiting yesterday that she says is because of worsening abdominal pain. 6/1/2022: No colitis on CT yesterday, showed moderate amount of stool in colon. Fibercon and Miralax started for probable overflow diarrhea.  Describes multiple bowel movements but seem to have some form to them when described by patient. . No overt bleeding. Hungry this morning. Low grade fever. ). Plan:   (- Continue fiber, Miralax, and Dificid. Hopefully home at least by tomorrow. - Continue Canasa suppository at  ).        Electronically signed by Neva Jones NP on 6/1/2022 at 3:58 PM      I have interviewed and examined patient with addendum to note above and formulation care plan to reflect my evaluation    Kathi Finley M.D.

## 2022-06-01 NOTE — PROGRESS NOTES
6/1/2022   CARE MANAGEMENT NOTE:  CM reviewed EMR. Pt was admitted with C-diff, left hydronephrosis, ulcerative colitis. Reportedly, pt resides with a friend Allison Albright. Son, Kent Mohs ([phone]) is the primary family contact.     RUR 9%; LOS 10 days     Transition Plan of Care:  1. Urology, GI following for medical management  2. Plan is for pt to return home without any homecare indicated (pt ambulated 70 feet on 5/31 and no further rehab services indicated  3. Outpt f/u  4. Friend or son will transport pt home     CM will continue to follow pt until discharged.   Bernadine

## 2022-06-02 ENCOUNTER — APPOINTMENT (OUTPATIENT)
Dept: GENERAL RADIOLOGY | Age: 67
DRG: 660 | End: 2022-06-02
Attending: INTERNAL MEDICINE
Payer: MEDICARE

## 2022-06-02 LAB
ANION GAP SERPL CALC-SCNC: 8 MMOL/L (ref 5–15)
BACTERIA SPEC CULT: NORMAL
BASOPHILS # BLD: 0.1 K/UL (ref 0–0.1)
BASOPHILS NFR BLD: 1 % (ref 0–1)
BUN SERPL-MCNC: 6 MG/DL (ref 6–20)
BUN/CREAT SERPL: 7 (ref 12–20)
CALCIUM SERPL-MCNC: 8.7 MG/DL (ref 8.5–10.1)
CC UR VC: NORMAL
CHLORIDE SERPL-SCNC: 108 MMOL/L (ref 97–108)
CO2 SERPL-SCNC: 24 MMOL/L (ref 21–32)
CREAT SERPL-MCNC: 0.85 MG/DL (ref 0.55–1.02)
DIFFERENTIAL METHOD BLD: ABNORMAL
EOSINOPHIL # BLD: 0.1 K/UL (ref 0–0.4)
EOSINOPHIL NFR BLD: 1 % (ref 0–7)
ERYTHROCYTE [DISTWIDTH] IN BLOOD BY AUTOMATED COUNT: 13.9 % (ref 11.5–14.5)
GLUCOSE SERPL-MCNC: 94 MG/DL (ref 65–100)
HCT VFR BLD AUTO: 37.4 % (ref 35–47)
HGB BLD-MCNC: 11.8 G/DL (ref 11.5–16)
IMM GRANULOCYTES # BLD AUTO: 0 K/UL (ref 0–0.04)
IMM GRANULOCYTES NFR BLD AUTO: 0 % (ref 0–0.5)
LYMPHOCYTES # BLD: 1.1 K/UL (ref 0.8–3.5)
LYMPHOCYTES NFR BLD: 15 % (ref 12–49)
MCH RBC QN AUTO: 31.2 PG (ref 26–34)
MCHC RBC AUTO-ENTMCNC: 31.6 G/DL (ref 30–36.5)
MCV RBC AUTO: 98.9 FL (ref 80–99)
MONOCYTES # BLD: 0.8 K/UL (ref 0–1)
MONOCYTES NFR BLD: 11 % (ref 5–13)
NEUTS SEG # BLD: 5.5 K/UL (ref 1.8–8)
NEUTS SEG NFR BLD: 72 % (ref 32–75)
NRBC # BLD: 0 K/UL (ref 0–0.01)
NRBC BLD-RTO: 0 PER 100 WBC
PLATELET # BLD AUTO: 381 K/UL (ref 150–400)
PMV BLD AUTO: 10.4 FL (ref 8.9–12.9)
POTASSIUM SERPL-SCNC: 4.3 MMOL/L (ref 3.5–5.1)
RBC # BLD AUTO: 3.78 M/UL (ref 3.8–5.2)
SERVICE CMNT-IMP: NORMAL
SODIUM SERPL-SCNC: 140 MMOL/L (ref 136–145)
WBC # BLD AUTO: 7.5 K/UL (ref 3.6–11)

## 2022-06-02 PROCEDURE — 74011250637 HC RX REV CODE- 250/637: Performed by: UROLOGY

## 2022-06-02 PROCEDURE — 65270000029 HC RM PRIVATE

## 2022-06-02 PROCEDURE — 74011250637 HC RX REV CODE- 250/637: Performed by: INTERNAL MEDICINE

## 2022-06-02 PROCEDURE — 74011250636 HC RX REV CODE- 250/636: Performed by: UROLOGY

## 2022-06-02 PROCEDURE — 74011250636 HC RX REV CODE- 250/636: Performed by: INTERNAL MEDICINE

## 2022-06-02 PROCEDURE — 80048 BASIC METABOLIC PNL TOTAL CA: CPT

## 2022-06-02 PROCEDURE — 85025 COMPLETE CBC W/AUTO DIFF WBC: CPT

## 2022-06-02 PROCEDURE — 36415 COLL VENOUS BLD VENIPUNCTURE: CPT

## 2022-06-02 PROCEDURE — 74011250637 HC RX REV CODE- 250/637: Performed by: NURSE PRACTITIONER

## 2022-06-02 PROCEDURE — 74018 RADEX ABDOMEN 1 VIEW: CPT

## 2022-06-02 RX ORDER — ACACIA 3 G/4 G
1 PACKET (EA) ORAL 2 TIMES DAILY
Qty: 14 PACKET | Refills: 0 | Status: SHIPPED | OUTPATIENT
Start: 2022-06-02 | End: 2022-06-09

## 2022-06-02 RX ORDER — MESALAMINE 1000 MG/1
1000 SUPPOSITORY RECTAL
Qty: 30 SUPPOSITORY | Refills: 0 | Status: SHIPPED | OUTPATIENT
Start: 2022-06-02 | End: 2022-07-02

## 2022-06-02 RX ORDER — VANCOMYCIN HYDROCHLORIDE 250 MG/5ML
500 POWDER, FOR SOLUTION ORAL EVERY 6 HOURS
Qty: 280 ML | Refills: 0 | Status: SHIPPED | OUTPATIENT
Start: 2022-06-02 | End: 2022-06-09

## 2022-06-02 RX ORDER — OXYCODONE HYDROCHLORIDE 5 MG/1
2.5 TABLET ORAL
Status: DISCONTINUED | OUTPATIENT
Start: 2022-06-02 | End: 2022-06-03 | Stop reason: HOSPADM

## 2022-06-02 RX ORDER — DIPHENOXYLATE HCL/ATROPINE 2.5-.025/5
5 LIQUID (ML) ORAL ONCE
Status: COMPLETED | OUTPATIENT
Start: 2022-06-02 | End: 2022-06-02

## 2022-06-02 RX ORDER — CALCIUM POLYCARBOPHIL 625 MG
1 TABLET ORAL DAILY
Status: DISCONTINUED | OUTPATIENT
Start: 2022-06-02 | End: 2022-06-03 | Stop reason: HOSPADM

## 2022-06-02 RX ADMIN — HYDROMORPHONE HYDROCHLORIDE 0.5 MG: 1 INJECTION, SOLUTION INTRAMUSCULAR; INTRAVENOUS; SUBCUTANEOUS at 07:16

## 2022-06-02 RX ADMIN — SODIUM CHLORIDE 75 ML/HR: 9 INJECTION, SOLUTION INTRAVENOUS at 15:57

## 2022-06-02 RX ADMIN — ONDANSETRON 4 MG: 2 INJECTION INTRAMUSCULAR; INTRAVENOUS at 09:21

## 2022-06-02 RX ADMIN — OXYCODONE 2.5 MG: 5 TABLET ORAL at 22:36

## 2022-06-02 RX ADMIN — CALCIUM POLYCARBOPHIL 625 MG: 625 TABLET, FILM COATED ORAL at 17:00

## 2022-06-02 RX ADMIN — OXYCODONE 2.5 MG: 5 TABLET ORAL at 13:26

## 2022-06-02 RX ADMIN — OXYCODONE 2.5 MG: 5 TABLET ORAL at 18:21

## 2022-06-02 RX ADMIN — FIDAXOMICIN 200 MG: 200 TABLET, FILM COATED ORAL at 18:21

## 2022-06-02 RX ADMIN — DIPHENOXYLATE HYDROCHLORIDE AND ATROPINE SULFATE 5 ML: 2.5; .025 SOLUTION ORAL at 15:00

## 2022-06-02 RX ADMIN — ENOXAPARIN SODIUM 40 MG: 100 INJECTION SUBCUTANEOUS at 09:06

## 2022-06-02 RX ADMIN — OXYCODONE 5 MG: 5 TABLET ORAL at 03:44

## 2022-06-02 RX ADMIN — ACETAMINOPHEN 650 MG: 325 TABLET ORAL at 22:35

## 2022-06-02 RX ADMIN — OXYCODONE 5 MG: 5 TABLET ORAL at 09:21

## 2022-06-02 RX ADMIN — ONDANSETRON 4 MG: 2 INJECTION INTRAMUSCULAR; INTRAVENOUS at 18:46

## 2022-06-02 RX ADMIN — NALOXEGOL OXALATE 12.5 MG: 12.5 TABLET, FILM COATED ORAL at 11:02

## 2022-06-02 RX ADMIN — MESALAMINE 1000 MG: 1000 SUPPOSITORY RECTAL at 23:57

## 2022-06-02 RX ADMIN — Medication 1 CAPSULE: at 09:07

## 2022-06-02 RX ADMIN — FIDAXOMICIN 200 MG: 200 TABLET, FILM COATED ORAL at 11:02

## 2022-06-02 NOTE — PROGRESS NOTES
Progress Note  Date:2022       Room:[facility]  Patient Name:Betsy Almanzar     YOB: 1955     Age:67 y.o. Subjective    Subjective:  Symptoms:  She reports diarrhea. Diet:  Adequate intake. No nausea or vomiting. Pain:  She complains of pain that is mild. Review of Systems   Constitutional: Positive for fatigue and fever. Negative for appetite change. Gastrointestinal: Positive for abdominal pain and diarrhea. Negative for blood in stool, nausea and vomiting. Skin: Negative for pallor. Objective         Vitals Last 24 Hours:  TEMPERATURE:  Temp  Av.9 °F (37.2 °C)  Min: 98.1 °F (36.7 °C)  Max: 99.2 °F (37.3 °C)  RESPIRATIONS RANGE: Resp  Av.7  Min: 17  Max: 18  PULSE OXIMETRY RANGE: SpO2  Av.4 %  Min: 93 %  Max: 94 %  PULSE RANGE: Pulse  Av.2  Min: 86  Max: 105  BLOOD PRESSURE RANGE: Systolic (52GRN), TGI:213 , Min:103 , MTY:190   ; Diastolic (32VFV), TKR:61, Min:58, Max:75    I/O (24Hr): Intake/Output Summary (Last 24 hours) at 2022 1516  Last data filed at 2022 0306  Gross per 24 hour   Intake --   Output 0 ml   Net 0 ml     Objective:  General Appearance:  Comfortable. Vital signs: (most recent): Blood pressure 110/69, pulse 90, temperature 98.7 °F (37.1 °C), resp. rate 18, height 5' 1\" (1.549 m), weight 60.4 kg (133 lb 2.5 oz), SpO2 93 %. Fever. Output: Producing stool. HEENT: Normal HEENT exam.    Lungs:  Normal effort and normal respiratory rate. Breath sounds clear to auscultation. Heart: Normal rate. Regular rhythm. S1 normal and S2 normal.  No murmur. Abdomen: Abdomen is soft and non-distended. Bowel sounds are normal.   There is right lower quadrant tenderness. Extremities: Normal range of motion. There is no dependent edema. Pulses: Distal pulses are intact. Neurological: Patient is alert and oriented to person, place and time.     Pupils:  Pupils are equal, round, and reactive to light. Skin:  Warm and dry. Labs/Imaging/Diagnostics    Labs:  CBC:  Recent Labs     06/02/22  0312 06/01/22  0840 05/31/22  0313   WBC 7.5 8.1 8.7   RBC 3.78* 3.98 3.92   HGB 11.8 12.2 11.9   HCT 37.4 39.3 37.3   MCV 98.9 98.7 95.2   RDW 13.9 13.8 14.0    386 359     CHEMISTRIES:  Recent Labs     06/02/22 0312 06/01/22  0225    140   K 4.3 5.3*    106   CO2 24 29   BUN 6 6   CA 8.7 8.7   PT/INR:No results for input(s): INR, INREXT, INREXT in the last 72 hours. No lab exists for component: PROTIME  APTT:No results for input(s): APTT in the last 72 hours. LIVER PROFILE:  No results for input(s): AST, ALT in the last 72 hours. No lab exists for component: BILIDIR, BILITOT, ALKPHOS  Lab Results   Component Value Date/Time    ALT (SGPT) 10 (L) 05/24/2022 12:23 AM    AST (SGOT) 10 (L) 05/24/2022 12:23 AM    Alk. phosphatase 55 05/24/2022 12:23 AM    Bilirubin, total 0.2 05/24/2022 12:23 AM       Imaging Last 24 Hours:  XR ABD PORT  1 V    Result Date: 6/2/2022  Examination: X-ray abdomen (KUB) INDICATION: Abdominal pain, rectal bleeding Comparison: CT abdomen pelvis 5/31/2022 Technique: Supine view the abdomen (2 images) Findings: Scattered air in small and large bowel without evidence of pathologic distension. No pathologic calcifications. Left nephroureteral stent appears in satisfactory position. No acute or aggressive osseous abnormality. Non-obstructive bowel gas pattern.     Assessment//Plan   Principal Problem:    Clostridium difficile diarrhea (5/22/2022)    Active Problems:    Nephrolithiasis (5/26/2021)      Hydronephrosis (5/26/2021)      Flank pain (5/26/2021)      Renal insufficiency (5/26/2021)      Bandemia (5/21/2022)      Hypokalemia (5/21/2022)      Dehydration (5/21/2022)      Colitis (5/21/2022)      Assessment:  (44-year-old female who in March was diagnosed with ulcerative proctitis and follows with my partner Dr. Belén Whitten.  She was initially placed on prednisone taper and mesalamine suppositories. More recently she notes worsening GI symptoms which include abdominal pain, nausea, and diarrhea. She tells me she has had 15 bowel movements already today, most of which are watery. Blood noted as well. She is not anemic though inflammatory markers are elevated. Stool positive for C. Difficile, she has been started on oral vancomycin and IV Flagyl. CT scan shows diffuse thickening up to the rectosigmoid junction as well as some thickening within the transverse colon. Last colonoscopy 3/2022 by Dr. Eboni Lambert with ulcerative proctitis. 5/23/2022: Reports ongoing diarrhea, unchanged by vancomycin. No rectal bleeding. Mild lower abdominal pain that is worse in the right lower quadrant on exam.  No nausea or vomiting. Continues to run a fever, T-max 100.4.    5/24/2022: Vancomycin increased to 500 mg yesterday. Diarrhea seems less. Had diarrhea first thing this morning and reports rectal bleeding that she says has been going on for a year although yesterday she said she was not having rectal bleeding. Ulcerative proctitis diagnosed 3/2022. She says the [facility]. suppositories have never helped and she stopped taking them at home. Abdominal pain is less but persists to some degree. Some nausea this morning. For cystoscopy with ureteral stent placement today. 5/25/2022: Stool more pasty, less watery. Not as much bleeding. Refused suppository last night because she thought it was the Anusol she has been taking although we discussed started mesalamine suppository yesterday. Less pain but more bloating. No nausea or vomiting. Tolerating diet. 5/26/2022: Was having some improvement with her diarrhea but reports multiple episodes of diarrhea this morning and that she has had a \"bad morning\". Had some mucus with a small amount of blood mixed in with her stool. She is concerned about fluid on her abdomen which she says has been there for months.   Review of the CT does not show any fluid in her abdomen so I suspect this is ongoing issues with bloating. She has had some nausea but no vomiting. Has not been able to eat today. Tmax 99.2.     5/27/2022: Ongoing diarrhea, 9 episodes documented. No rectal bleeding. Hungry this morning, breakfast is late. Low grade fever, 99.1. No nausea or vomiting. Abdominal pain somewhat better. 5/31/2022: Just had stool and had saved sample. It appears very loose but not watery. No overt signs of GIB. Tmax 100.5. Seems more TTP in RLQ than last week. Concerned about going home without clear improvement. On day 5 of Dificid after failed treatment with 4 days of vancomycin. Nausea and vomiting yesterday that she says is because of worsening abdominal pain. 6/1/2022: No colitis on CT yesterday, showed moderate amount of stool in colon. Fibercon and Miralax started for probable overflow diarrhea. Describes multiple bowel movements but seem to have some form to them when described by patient. . No overt bleeding. Hungry this morning. Low grade fever. 6/2/2022: Given laxatives yesterday to try to clear stool in colon seen on CT. Multiple episodes of diarrhea, KUB clear. Still with small amount rectal bleeding that is worse with diarrhea. Concerned about going home and needing 10 more days of abx and also concerned about ongoing pain which she says is due to her kidney stone. Ongoing low grade fever. No colitis on CT recently. ). Plan:   (- Continue fiber, Miralax, and Dificid. Can try antidiarrheals prn if diarrhea persists. Hopefully home tomorrow, would need 3 more days of Dificid to complete 10 day course. - Continue Canasa suppository at HS ).        Electronically signed by Chris Duran NP on 6/2/2022 at 3:58 PM      I have interviewed and examined patient with addendum to note above and formulation care plan to reflect my evaluation    Sherif West M.D.

## 2022-06-02 NOTE — PROGRESS NOTES
Problem: Risk for Spread of Infection  Goal: Prevent transmission of infectious organism to others  Description: Prevent the transmission of infectious organisms to other patients, staff members, and visitors. Outcome: Progressing Towards Goal     Problem: Patient Education:  Go to Education Activity  Goal: Patient/Family Education  Outcome: Progressing Towards Goal     Problem: Falls - Risk of  Goal: *Absence of Falls  Description: Document Regina Horner Fall Risk and appropriate interventions in the flowsheet. Outcome: Progressing Towards Goal  Note: Fall Risk Interventions:  Mobility Interventions: Bed/chair exit alarm,Patient to call before getting OOB         Medication Interventions: Bed/chair exit alarm,Patient to call before getting OOB    Elimination Interventions: Call light in reach,Patient to call for help with toileting needs              Problem: Patient Education: Go to Patient Education Activity  Goal: Patient/Family Education  Outcome: Progressing Towards Goal     Problem: Pain  Goal: *Control of Pain  Outcome: Progressing Towards Goal  Goal: *PALLIATIVE CARE:  Alleviation of Pain  Outcome: Progressing Towards Goal     Problem: Patient Education: Go to Patient Education Activity  Goal: Patient/Family Education  Outcome: Progressing Towards Goal     Problem:  Activity Intolerance  Goal: *Oxygen saturation during activity within specified parameters  Outcome: Progressing Towards Goal  Goal: *Able to remain out of bed as prescribed  Outcome: Progressing Towards Goal     Problem: Patient Education: Go to Patient Education Activity  Goal: Patient/Family Education  Outcome: Progressing Towards Goal     Problem: Patient Education: Go to Patient Education Activity  Goal: Patient/Family Education  Outcome: Progressing Towards Goal     Problem: Nutrition Deficit  Goal: *Optimize nutritional status  Outcome: Progressing Towards Goal

## 2022-06-02 NOTE — PROGRESS NOTES
Bedside, Verbal and Written shift change report given to [address] (oncoming nurse) by Camille Garcia / DORITA MONGE RN (offgoing nurse). Report included the following information SBAR, Kardex, Procedure Summary, Intake/Output, MAR, Recent Results, Med Rec Status and Cardiac Rhythm and pain mgt.

## 2022-06-02 NOTE — PROGRESS NOTES
Shaan [address]  [address] [facility], [place], [id number] [place] [address]  [phone]      Medical Progress Note      NAME:         Shama Kaur   :        1955  MRM:        [id number]    Date of service: 2022      Subjective: \"I'm okay\"     Pt seen and examined. After Miralax and Movantik having copious stools (suspect stool in colon noted on CT now resolved). Was not happy about narcotics being weaned and does not think she should be discharged when \"actively contagious\". Explained C.diff and that with good hand hygiene and regular cleaning of her bathroom should be low risk     Objective:    Vital Signs:    Visit Vitals  /69 (BP 1 Location: Right upper arm, BP Patient Position: At rest)   Pulse 90   Temp 98.7 °F (37.1 °C)   Resp 18   Ht 5' 1\" (1.549 m)   Wt 60.4 kg (133 lb 2.5 oz)   SpO2 93%   BMI 25.16 kg/m²          Intake/Output Summary (Last 24 hours) at 2022 1419  Last data filed at 2022 0306  Gross per 24 hour   Intake --   Output 0 ml   Net 0 ml        Physical Examination:    General:   Weak looking and not in any distress   Eyes:   pink conjunctivae, PERRLA with no discharge. ENT:   no ottorrhea or rhinorrhea with dry mucous membranes  Pulm:  clear breath sounds without crackles or wheezes  Card:  has regular and normal S1, S2 without thrills, bruits or peripheral edema  Abd:  Soft, non-tender, non-distended, normoactive bowel sounds   Musc:  No cyanosis, clubbing, atrophy or deformities. Neuro: Awake and alert.  Generally a non focal exam.   Psych:  Has a good insight and is oriented x 3    Current Facility-Administered Medications   Medication Dose Route Frequency    oxyCODONE IR (ROXICODONE) tablet 2.5 mg  2.5 mg Oral Q4H PRN    calcium polycarbophiL (FIBERCON) tablet 625 mg  1 Tablet Oral DAILY    0.9% sodium chloride infusion  75 mL/hr IntraVENous CONTINUOUS    methebonie-m.blue-s.phos-phsal-hyo (URELLE) 81-10.8-40.8 mg tablet tab 1 Tablet  1 Tablet Oral QID PRN    fidaxomicin (DIFICID) tablet 200 mg  200 mg Oral BID    mesalamine (CANASA) suppository 1,000 mg  1 Suppository Rectal QHS    L.acidophilus-paracasei-S.thermophil-bifidobacter (RISAQUAD) 8 billion cell capsule  1 Capsule Oral DAILY    prochlorperazine (COMPAZINE) injection 5 mg  5 mg IntraVENous Q6H PRN    acetaminophen (TYLENOL) tablet 650 mg  650 mg Oral Q6H PRN    polyethylene glycol (MIRALAX) packet 17 g  17 g Oral DAILY PRN    ondansetron (ZOFRAN) injection 4 mg  4 mg IntraVENous Q6H PRN    enoxaparin (LOVENOX) injection 40 mg  40 mg SubCUTAneous DAILY        Laboratory data and review:    Recent Labs     06/02/22  0312 06/01/22  0840 05/31/22  0313   WBC 7.5 8.1 8.7   HGB 11.8 12.2 11.9   HCT 37.4 39.3 37.3    386 359     Recent Labs     06/02/22  0312 06/01/22  0225    140   K 4.3 5.3*    106   CO2 24 29   GLU 94 93   BUN 6 6   CREA 0.85 0.91   CA 8.7 8.7     No components found for: GLPOC    Assessment and Plan:  74 yo hx of ulcerative colitis, presented w/ sepsis, C-diff colitis, L hydronephrosis w/ obstructing stone, Peggy     Clostridium difficile diarrhea (5/22/2022) POA: CT without e/o colitis. At this point suspect pt now has overflow diarrhea which is now resolved on KUB today   -continue Florastor   -continue fidoxamicin  -CT showing stool in colon so suspect now actually has overflow diarrhea from constipation => now resolved   -resume Banatrol, fiber     L hydronephrosis from an obstructing stone POA: CT abdomen and pelvis had shown a left proximal ureteral 10 mm calculous. Seen by Urology and she is sp cysto and L ureteral stent on 05/24  -repeat urine cultures without e/o infection      Ulcerative Colitis POA:   -on mesalamine     Insomnia: this is a chronic issue  -xanax qHS     Complicated UTI due to obstructing stone POA:  -repeat urine cultures without significant growth    Fever - resolved. Unclear etiology.  ?mild atelectasis   -urine cultures without e/o UTI   -CT showed no active colitis   -rapid COVID negative   -no respiratory symptoms or hypoxia     Total time spent for the patient's care: 35 minutes                   Care Plan discussed with: Patient, Care Manager and Nursing Staff     Discussed:  Care Plan and D/C Planning    Prophylaxis:  Lovenox    Anticipated Disposition:  Home w/Family           ___________________________________________________    Attending Physician:   Rakel Rose MD

## 2022-06-02 NOTE — PROGRESS NOTES
6/2/2022   CARE MANAGEMENT NOTE:  CM reviewed EMR.  Pt was admitted with C-diff, left hydronephrosis, ulcerative colitis. Reportedly, pt resides with a friend Clemencia Duarte. Son, Tamar Hanna ([phone]) is the primary family contact.     RUR 9%; LOS 11 days     Transition Plan of Care:  1.  Urology, GI following for medical management  2. Anderson Rivera is for pt to return home without any homecare indicated (pt ambulated 70 feet on 5/31 and no further rehab services indicated  3.  Outpt f/u  4.  Friend or son will transport pt home     CM will continue to follow pt until discharged.   Bernadine

## 2022-06-02 NOTE — PROGRESS NOTES
Comprehensive Nutrition Assessment    Type and Reason for Visit: Reassess    Nutrition Recommendations/Plan:   1. Continue regular diet order  2. Continue Banatrol TID to aid in BM management      Malnutrition Assessment:  Malnutrition Status:  Mild malnutrition (05/27/22 1020)    Context:  Acute illness     Findings of the 6 clinical characteristics of malnutrition:   Energy Intake:  No significant decrease in energy intake  Weight Loss:  No significant weight loss     Body Fat Loss:  No significant body fat loss,     Muscle Mass Loss:  Mild muscle mass loss, Clavicles (pectoralis & deltoids)  Fluid Accumulation:  No significant fluid accumulation,     Strength:  Not performed     Nutrition Assessment:    6/2: follow up. Discontinued banatrol this Am per discussion with MD during IDR, but Banatrol re-ordered by MD this afternoon. PO intake averaging 75% or more of meals. Patient with no complaints at this time. Loose stools continue. Per IDR discussion, possible discharge in next few days. 5/27: Pt is a 75year old female admitted with Colitis [K52.9]. She  has a past medical history of C. difficile colitis, cervical cancer, Lyme disease, skin cancer, basal cell, and Kidney stones. Patient states -130#; quit smoking in December 2020 and gained weight from 115#. Per documentation, patient has weighed ~130# x 5 years. No N/V. No chewing/swallowing problems. Noted allergy to 'seafood'. States appetite is 'okay', usually eats 5-6 small meals/day so has trouble consuming 3 larger meals. Was started on banatrol yesterday to control diarrhea, patient reports consuming 100% of this last night and 'already seeing a difference'. Has not yet consumed this morning. No need for ONS at this time, but patient with mild clavicle muscle loss. Confirms episode of diarrhea this morning.      Wt Readings from Last 10 Encounters:   05/21/22 60.4 kg (133 lb 2.5 oz)   04/24/22 54.4 kg (120 lb)   05/26/21 58.1 kg (128 lb) 10/18/17 58.1 kg (128 lb 1.4 oz)   10/14/17 59.3 kg (130 lb 11.2 oz)       Nutrition Related Findings:      Wound Type: Surgical incision (perineum)     Last Bowel Movement Date: 06/01/22  Stool Appearance: Loose  Abdominal Assessment: Distended  Appetite: Poor  Bowel Sounds: Active   Edema:No data recorded    Nutr. Labs:      Lab Results   Component Value Date/Time    GFR est AA >60 06/02/2022 03:12 AM    GFR est non-AA >60 06/02/2022 03:12 AM    Creatinine 0.85 06/02/2022 03:12 AM    BUN 6 06/02/2022 03:12 AM    Sodium 140 06/02/2022 03:12 AM    Potassium 4.3 06/02/2022 03:12 AM    Chloride 108 06/02/2022 03:12 AM    CO2 24 06/02/2022 03:12 AM       Lab Results   Component Value Date/Time    Glucose 94 06/02/2022 03:12 AM    Glucose (POC) 93 10/16/2017 09:19 AM       Lab Results   Component Value Date/Time    Hemoglobin A1c 5.5 10/15/2017 02:24 PM       Nutr. Meds:  Lovenox, dificid, risaquad, lactated ringers @ 75, canasa, zofran PRN, protonix, miralax PRN     Current Nutrition Intake & Therapies:  Average Meal Intake: %  Average Supplement Intake: %  ADULT DIET Regular  DIET ONE TIME MESSAGE  ADULT ORAL NUTRITION SUPPLEMENT Breakfast, Lunch, Dinner; Other Supplement; see below    Anthropometric Measures:  Height: 5' 1\" (154.9 cm)  Ideal Body Weight (IBW): 105 lbs (48 kg)  Admission Body Weight: 133 lb 2.5 oz  Current Body Wt:  60.4 kg (133 lb 2.5 oz), 126.8 % IBW. Stated  Current BMI (kg/m2): 25.2        Weight Adjustment: No adjustment                 BMI Category: Overweight (BMI 25.0-29. 9)    Estimated Daily Nutrient Needs:  Energy Requirements Based On: Formula  Weight Used for Energy Requirements: Current  Energy (kcal/day): 1394 (MSJ x 1.3)  Weight Used for Protein Requirements: Current  Protein (g/day): 60 (1.0 g/kg)  Method Used for Fluid Requirements: 1 ml/kcal  Fluid (ml/day): 4444    Nutrition Diagnosis:   · Inadequate oral intake (predicted) related to inadequate protein-energy intake as evidenced by mild muscle loss, reports of low PO intake PTA    Nutrition Interventions:   Food and/or Nutrient Delivery: Continue current diet,Continue oral nutrition supplement  Nutrition Education/Counseling: No recommendations at this time  Coordination of Nutrition Care: Continue to monitor while inpatient,Interdisciplinary rounds       Goals:  Previous Goal Met: Goal(s) achieved  Goals: Meet at least 75% of estimated needs,by next RD assessment       Nutrition Monitoring and Evaluation:   Behavioral-Environmental Outcomes: None identified  Food/Nutrient Intake Outcomes: Food and nutrient intake,Supplement intake  Physical Signs/Symptoms Outcomes: Biochemical data,Skin,Weight,Diarrhea    Discharge Planning:    Continue oral nutrition supplement    Jazmine Espinoza MS, RD  Contact: Ext: [phone], or via GetMyRx

## 2022-06-03 VITALS
SYSTOLIC BLOOD PRESSURE: 110 MMHG | TEMPERATURE: 98.5 F | HEART RATE: 99 BPM | WEIGHT: 133.16 LBS | BODY MASS INDEX: 25.14 KG/M2 | DIASTOLIC BLOOD PRESSURE: 78 MMHG | RESPIRATION RATE: 18 BRPM | HEIGHT: 61 IN | OXYGEN SATURATION: 94 %

## 2022-06-03 PROCEDURE — 74011250637 HC RX REV CODE- 250/637: Performed by: INTERNAL MEDICINE

## 2022-06-03 PROCEDURE — 74011250636 HC RX REV CODE- 250/636: Performed by: UROLOGY

## 2022-06-03 PROCEDURE — 74011250636 HC RX REV CODE- 250/636: Performed by: INTERNAL MEDICINE

## 2022-06-03 PROCEDURE — 74011250637 HC RX REV CODE- 250/637: Performed by: NURSE PRACTITIONER

## 2022-06-03 PROCEDURE — 74011250637 HC RX REV CODE- 250/637: Performed by: UROLOGY

## 2022-06-03 RX ADMIN — Medication 1 CAPSULE: at 08:33

## 2022-06-03 RX ADMIN — OXYCODONE 2.5 MG: 5 TABLET ORAL at 12:35

## 2022-06-03 RX ADMIN — CALCIUM POLYCARBOPHIL 625 MG: 625 TABLET, FILM COATED ORAL at 08:33

## 2022-06-03 RX ADMIN — ENOXAPARIN SODIUM 40 MG: 100 INJECTION SUBCUTANEOUS at 08:33

## 2022-06-03 RX ADMIN — PROCHLORPERAZINE EDISYLATE 5 MG: 5 INJECTION INTRAMUSCULAR; INTRAVENOUS at 02:59

## 2022-06-03 RX ADMIN — OXYCODONE 2.5 MG: 5 TABLET ORAL at 02:57

## 2022-06-03 RX ADMIN — ONDANSETRON 4 MG: 2 INJECTION INTRAMUSCULAR; INTRAVENOUS at 15:05

## 2022-06-03 RX ADMIN — FIDAXOMICIN 200 MG: 200 TABLET, FILM COATED ORAL at 08:33

## 2022-06-03 RX ADMIN — OXYCODONE 2.5 MG: 5 TABLET ORAL at 08:33

## 2022-06-03 RX ADMIN — ONDANSETRON 4 MG: 2 INJECTION INTRAMUSCULAR; INTRAVENOUS at 08:33

## 2022-06-03 RX ADMIN — SODIUM CHLORIDE 75 ML/HR: 9 INJECTION, SOLUTION INTRAVENOUS at 06:18

## 2022-06-03 NOTE — PROGRESS NOTES
Chart reviewed,   Spoke with GI this AM, their note reviewed, OK for pt to DC from their perspective. Given soon anticipated DC , I have rescheduled pts f/u with [facility]. She is scheduled to f/u sp left ureteral stent placement 6/10/22 at 1020AM with Dr. Rashaun Macias at the [facility] office location.

## 2022-06-03 NOTE — PROGRESS NOTES
Problem: Risk for Spread of Infection  Goal: Prevent transmission of infectious organism to others  Description: Prevent the transmission of infectious organisms to other patients, staff members, and visitors. Outcome: Progressing Towards Goal     Problem: Patient Education:  Go to Education Activity  Goal: Patient/Family Education  Outcome: Progressing Towards Goal     Problem: Falls - Risk of  Goal: *Absence of Falls  Description: Document Mona Winkler Fall Risk and appropriate interventions in the flowsheet. Outcome: Progressing Towards Goal  Note: Fall Risk Interventions:  Mobility Interventions: Bed/chair exit alarm,Patient to call before getting OOB         Medication Interventions: Bed/chair exit alarm,Patient to call before getting OOB    Elimination Interventions: Call light in reach,Patient to call for help with toileting needs              Problem: Patient Education: Go to Patient Education Activity  Goal: Patient/Family Education  Outcome: Progressing Towards Goal     Problem: Pain  Goal: *Control of Pain  Outcome: Progressing Towards Goal  Goal: *PALLIATIVE CARE:  Alleviation of Pain  Outcome: Progressing Towards Goal     Problem: Patient Education: Go to Patient Education Activity  Goal: Patient/Family Education  Outcome: Progressing Towards Goal     Problem:  Activity Intolerance  Goal: *Oxygen saturation during activity within specified parameters  Outcome: Progressing Towards Goal  Goal: *Able to remain out of bed as prescribed  Outcome: Progressing Towards Goal     Problem: Patient Education: Go to Patient Education Activity  Goal: Patient/Family Education  Outcome: Progressing Towards Goal     Problem: Patient Education: Go to Patient Education Activity  Goal: Patient/Family Education  Outcome: Progressing Towards Goal     Problem: Nutrition Deficit  Goal: *Optimize nutritional status  Outcome: Progressing Towards Goal

## 2022-06-03 NOTE — PROGRESS NOTES
Bedside, Verbal and Written shift change report given to Kanwal (oncoming nurse) by Valentino Hoff / Tiffanie Duque RN (offgoing nurse). Report included the following information SBAR, Kardex, Procedure Summary, Intake/Output, MAR, Recent Results and Med Rec Status.

## 2022-06-03 NOTE — PROGRESS NOTES
Bedside and Verbal shift change report given to Ginny Barnhart RN (oncoming nurse) by Monica Orozco RN (offgoing nurse). Report included the following information SBAR, Kardex, Procedure Summary, Intake/Output, MAR and Recent Results.

## 2022-06-03 NOTE — PROGRESS NOTES
Progress Note  Date:6/3/2022       Room:[facility]  Patient Name:Betsy Figueroa     YOB: 1955     Age:67 y.o. Subjective    Subjective:  Symptoms:  No diarrhea. Diet:  Adequate intake. No nausea or vomiting. Pain:  She complains of pain that is mild. Review of Systems   Constitutional: Positive for fatigue and fever. Negative for appetite change. Gastrointestinal: Positive for abdominal pain. Negative for blood in stool, diarrhea, nausea and vomiting. Skin: Negative for pallor. Objective         Vitals Last 24 Hours:  TEMPERATURE:  Temp  Av.9 °F (37.2 °C)  Min: 98.2 °F (36.8 °C)  Max: 99.6 °F (37.6 °C)  RESPIRATIONS RANGE: Resp  Av  Min: 18  Max: 18  PULSE OXIMETRY RANGE: SpO2  Av.7 %  Min: 93 %  Max: 98 %  PULSE RANGE: Pulse  Av.3  Min: 90  Max: 100  BLOOD PRESSURE RANGE: Systolic (90OKA), TJ , Min:90 , SIV:542   ; Diastolic (73IFM), ERR:48, Min:57, Max:70    I/O (24Hr): Intake/Output Summary (Last 24 hours) at 6/3/2022 1028  Last data filed at 6/3/2022 0319  Gross per 24 hour   Intake 480 ml   Output 0 ml   Net 480 ml     Objective:  General Appearance:  Comfortable. Vital signs: (most recent): Blood pressure 101/68, pulse 100, temperature 99.4 °F (37.4 °C), resp. rate 18, height 5' 1\" (1.549 m), weight 60.4 kg (133 lb 2.5 oz), SpO2 93 %. Fever. Output: Producing stool. HEENT: Normal HEENT exam.    Lungs:  Normal effort and normal respiratory rate. Breath sounds clear to auscultation. Heart: Normal rate. Regular rhythm. S1 normal and S2 normal.  No murmur. Abdomen: Abdomen is soft and non-distended. Bowel sounds are normal.   There is right lower quadrant tenderness. Extremities: Normal range of motion. There is no dependent edema. Pulses: Distal pulses are intact. Neurological: Patient is alert and oriented to person, place and time.     Pupils:  Pupils are equal, round, and reactive to light.    Skin:  Warm and dry. Labs/Imaging/Diagnostics    Labs:  CBC:  Recent Labs     06/02/22  0312 06/01/22  0840   WBC 7.5 8.1   RBC 3.78* 3.98   HGB 11.8 12.2   HCT 37.4 39.3   MCV 98.9 98.7   RDW 13.9 13.8    386     CHEMISTRIES:  Recent Labs     06/02/22  0312 06/01/22  0225    140   K 4.3 5.3*    106   CO2 24 29   BUN 6 6   CA 8.7 8.7   PT/INR:No results for input(s): INR, INREXT, INREXT in the last 72 hours. No lab exists for component: PROTIME  APTT:No results for input(s): APTT in the last 72 hours. LIVER PROFILE:  No results for input(s): AST, ALT in the last 72 hours. No lab exists for component: BILIDIR, BILITOT, ALKPHOS  Lab Results   Component Value Date/Time    ALT (SGPT) 10 (L) 05/24/2022 12:23 AM    AST (SGOT) 10 (L) 05/24/2022 12:23 AM    Alk. phosphatase 55 05/24/2022 12:23 AM    Bilirubin, total 0.2 05/24/2022 12:23 AM       Imaging Last 24 Hours:  XR ABD PORT  1 V    Result Date: 6/2/2022  Examination: X-ray abdomen (KUB) INDICATION: Abdominal pain, rectal bleeding Comparison: CT abdomen pelvis 5/31/2022 Technique: Supine view the abdomen (2 images) Findings: Scattered air in small and large bowel without evidence of pathologic distension. No pathologic calcifications. Left nephroureteral stent appears in satisfactory position. No acute or aggressive osseous abnormality. Non-obstructive bowel gas pattern. Assessment//Plan   Principal Problem:    Clostridium difficile diarrhea (5/22/2022)    Active Problems:    Nephrolithiasis (5/26/2021)      Hydronephrosis (5/26/2021)      Flank pain (5/26/2021)      Renal insufficiency (5/26/2021)      Bandemia (5/21/2022)      Hypokalemia (5/21/2022)      Dehydration (5/21/2022)      Colitis (5/21/2022)      Assessment:  (68-year-old female who in March was diagnosed with ulcerative proctitis and follows with my partner Dr. Rishabh Hsu.  She was initially placed on prednisone taper and mesalamine suppositories.   More recently she notes worsening GI symptoms which include abdominal pain, nausea, and diarrhea. She tells me she has had 15 bowel movements already today, most of which are watery. Blood noted as well. She is not anemic though inflammatory markers are elevated. Stool positive for C. Difficile, she has been started on oral vancomycin and IV Flagyl. CT scan shows diffuse thickening up to the rectosigmoid junction as well as some thickening within the transverse colon. Last colonoscopy 3/2022 by Dr. Kenneth Evans with ulcerative proctitis. 5/23/2022: Reports ongoing diarrhea, unchanged by vancomycin. No rectal bleeding. Mild lower abdominal pain that is worse in the right lower quadrant on exam.  No nausea or vomiting. Continues to run a fever, T-max 100.4.    5/24/2022: Vancomycin increased to 500 mg yesterday. Diarrhea seems less. Had diarrhea first thing this morning and reports rectal bleeding that she says has been going on for a year although yesterday she said she was not having rectal bleeding. Ulcerative proctitis diagnosed 3/2022. She says the [facility]. suppositories have never helped and she stopped taking them at home. Abdominal pain is less but persists to some degree. Some nausea this morning. For cystoscopy with ureteral stent placement today. 5/25/2022: Stool more pasty, less watery. Not as much bleeding. Refused suppository last night because she thought it was the Anusol she has been taking although we discussed started mesalamine suppository yesterday. Less pain but more bloating. No nausea or vomiting. Tolerating diet. 5/26/2022: Was having some improvement with her diarrhea but reports multiple episodes of diarrhea this morning and that she has had a \"bad morning\". Had some mucus with a small amount of blood mixed in with her stool. She is concerned about fluid on her abdomen which she says has been there for months.   Review of the CT does not show any fluid in her abdomen so I suspect this is ongoing issues with bloating. She has had some nausea but no vomiting. Has not been able to eat today. Tmax 99.2.     5/27/2022: Ongoing diarrhea, 9 episodes documented. No rectal bleeding. Hungry this morning, breakfast is late. Low grade fever, 99.1. No nausea or vomiting. Abdominal pain somewhat better. 5/31/2022: Just had stool and had saved sample. It appears very loose but not watery. No overt signs of GIB. Tmax 100.5. Seems more TTP in RLQ than last week. Concerned about going home without clear improvement. On day 5 of Dificid after failed treatment with 4 days of vancomycin. Nausea and vomiting yesterday that she says is because of worsening abdominal pain. 6/1/2022: No colitis on CT yesterday, showed moderate amount of stool in colon. Fibercon and Miralax started for probable overflow diarrhea. Describes multiple bowel movements but seem to have some form to them when described by patient. . No overt bleeding. Hungry this morning. Low grade fever. 6/2/2022: Given laxatives yesterday to try to clear stool in colon seen on CT. Multiple episodes of diarrhea, KUB clear. Still with small amount rectal bleeding that is worse with diarrhea. Concerned about going home and needing 10 more days of abx and also concerned about ongoing pain which she says is due to her kidney stone. Ongoing low grade fever. No colitis on CT recently. 6/3/2022: Reports for low-volume loose stools since yesterday. No watery diarrhea. Tolerating her diet. She says that she was contacted by her pharmacy yesterday regarding a prescription sent in for 7 days of Dificid. I confirmed with her that she only needs 3 more days, 6 capsules. I will send in the prescription through our EMR to her pharmacy. ). Plan:   (- Okay for discharge from GI perspective. Would recommend she continue to take fiber daily.   I sent in a 3-day course of Dificid for her to complete a 10-day course, she has already taken 7 days while here.  GI office will be contacting her to arrange follow-up. - Continue Canasa suppository at HS     Please call if GI is needed further. ).        Electronically signed by Nathalia Booth NP on 6/3/2022 at 3:58 PM      I have interviewed and examined patient with addendum to note above and formulation care plan to reflect my evaluation    Adam Banda M.D.

## 2022-06-03 NOTE — PROGRESS NOTES
6/3/2022   CARE MANAGEMENT NOTE:  CM reviewed EMR.   Pt was admitted with C-diff, left hydronephrosis, ulcerative colitis. Reportedly, pt resides with a friend Carmina Cam. Son, Jairo Mohan ([phone]) is the primary family contact.     RUR 9%; MDJ 51 JTEU     Transition Plan of Care:  1.  Urology, GI following for medical management  2. Alessandra Adames is for pt to return home without any homecare indicated (pt ambulated 70 feet on 5/31 and no further rehab services indicated  3.  Outpt f/u  4.  Friend or son will transport pt home     CM will continue to follow pt until discharged.   Bernadine

## 2022-06-03 NOTE — PROGRESS NOTES
Discharge instructions reviewed with patient. Verbalized understanding. Signed hard copy of AVS in the chart. Peripheral IV removed, catheter intact. Patient not in distress. Waiting on volunteer services to transport patient.

## 2022-06-03 NOTE — DISCHARGE SUMMARY
Discharge Summary       PATIENT ID: Senia Weinberg  MRN: [id number]   YOB: 1955    DATE OF ADMISSION: 5/21/2022 11:57 AM    DATE OF DISCHARGE: 6/3/2022  PRIMARY CARE PROVIDER: None       DISCHARGING PHYSICIAN: Austyn Damico MD    To contact this individual call [phone] and ask the  to page. If unavailable ask to be transferred the Adult Hospitalist Department. CONSULTATIONS: IP CONSULT TO GASTROENTEROLOGY  IP CONSULT TO UROLOGY    PROCEDURES/SURGERIES: Procedure(s):  CYSTOSCOPY WITH left ureteral RETROGRADES LEFT STENT INSERTION    ADMITTING DIAGNOSES & HOSPITAL COURSE:     Ms. Oniel James is a 75 y.o.  female who presented to the Emergency Department complaining of abd pain. She is a poor historian. Abd pain for a year. She states diarrhea 30 times a day for a year. Colonoscopy by Dr Abe Alarcon about 6 weeks ago. Hx of colitis and failed to improve on outpatient PO ABx. In ER we find bandemia but no other SIRS criteria. She reports continuous GI bleeding for a year, but we find no anemia. Colitis suggested by non contrast CT. Nephrolithiasis/hydronephrosis on CT. We will admit her for management.     DISCHARGE DIAGNOSES / PLAN:      74 yo hx of ulcerative colitis, presented w/ sepsis, C-diff colitis, L hydronephrosis w/ obstructing stone, Peggy     Clostridium difficile diarrhea (5/22/2022) POA: CT without e/o colitis. At this point suspect pt now has overflow diarrhea which is now resolved on KUB today   -continue Florastor   -On fidoxamicin on admission but due to cost this will be switched to oral vancomycin every 6 hrs x 7 days  -CT showing stool in colon so suspect now actually has overflow diarrhea from constipation => now resolved   -resume Banatrol, fiber      L hydronephrosis from an obstructing stone POA: CT abdomen and pelvis had shown a left proximal ureteral 10 mm calculous.  Seen by Urology and she is sp cysto and L ureteral stent on 05/24  -repeat urine cultures without e/o infection   - She is scheduled to f/u with Dr Flora Mejia at [facility] office on 6/10/2022     Ulcerative Colitis POA:   -on mesalamine      Insomnia: this is a chronic issue  -xanax qHS      Complicated UTI due to obstructing stone POA:  -repeat urine cultures without significant growth     Fever - resolved. Unclear etiology. ?mild atelectasis   -urine cultures without e/o UTI   -CT showed no active colitis   -rapid COVID negative   -no respiratory symptoms or hypoxia      Disposition: Hemodynamic stable and has improved. Discharge home              PENDING TEST RESULTS:   At the time of discharge the following test results are still pending:     FOLLOW UP APPOINTMENTS:    Follow-up Information     Follow up With Specialties Details Why [address] [facility] Urology  On 6/10/2022 Dr. Flora Mejia at [facility] [address]    None    None (395) Patient stated that they have no PCP             ADDITIONAL CARE RECOMMENDATIONS:     DIET: Regular Diet    ACTIVITY: Activity as tolerated    WOUND CARE:     EQUIPMENT needed:       DISCHARGE MEDICATIONS:  Current Discharge Medication List      START taking these medications    Details   vancomycin (FIRVANQ) 50 mg/mL oral solution Take 10 mL by mouth every six (6) hours for 7 days. Qty: 280 mL, Refills: 0  Start date: 6/2/2022, End date: 6/9/2022      L.acid,para-B. bifidum-S.therm (RISAQUAD) 8 billion cell cap cap Take 1 Capsule by mouth daily. Qty: 30 Capsule, Refills: 0  Start date: 6/3/2022      mesalamine (CANASA) 1,000 mg suppository Insert 1 Suppository into rectum nightly for 30 days. Qty: 30 Suppository, Refills: 0  Start date: 6/2/2022, End date: 7/2/2022      banana flakes trans-galactooligosaccharide (Banatrol) powder Take 1 Packet by mouth two (2) times a day for 7 days.   Qty: 14 Packet, Refills: 0  Start date: 6/2/2022, End date: 6/9/2022         CONTINUE these medications which have NOT CHANGED Details   cholecalciferol (Vitamin D3) (1000 Units /25 mcg) tablet Take 1,000 Units by mouth daily. aspirin 81 mg chewable tablet Take 1 Tab by mouth daily. Qty: 30 Tab, Refills: 0               NOTIFY YOUR PHYSICIAN FOR ANY OF THE FOLLOWING:   Fever over 101 degrees for 24 hours. Chest pain, shortness of breath, fever, chills, nausea, vomiting, diarrhea, change in mentation, falling, weakness, bleeding. Severe pain or pain not relieved by medications. Or, any other signs or symptoms that you may have questions about. DISPOSITION:  x  Home With:   OT  PT  HH  RN       Long term SNF/Inpatient Rehab    Independent/assisted living    Hospice    Other:       PATIENT CONDITION AT DISCHARGE:     Functional status    Poor     Deconditioned    x Independent      Cognition   x  Lucid     Forgetful     Dementia      Catheters/lines (plus indication)    Ferguson     PICC     PEG    x None      Code status   x  Full code     DNR      PHYSICAL EXAMINATION AT DISCHARGE:   Refer to Progress Note  General:   Weak looking and not in any distress   Eyes:   pink conjunctivae, PERRLA with no discharge. ENT:   no ottorrhea or rhinorrhea with dry mucous membranes  Pulm:  clear breath sounds without crackles or wheezes  Card:  has regular and normal S1, S2 without thrills, bruits or peripheral edema  Abd:  Soft, non-tender, non-distended, normoactive bowel sounds   Musc:  No cyanosis, clubbing, atrophy or deformities. Neuro: Awake and alert.  Generally a non focal exam.   Psych:  Has a good insight and is oriented x 3      CHRONIC MEDICAL DIAGNOSES:  Problem List as of 6/3/2022 Date Reviewed: 5/21/2022          Codes Class Noted - Resolved    * (Principal) Clostridium difficile diarrhea ICD-10-CM: A04.72  ICD-9-CM: 008.45  5/22/2022 - Present        Bandemia ICD-10-CM: C73.858  ICD-9-CM: 288.66  5/21/2022 - Present        Hypokalemia ICD-10-CM: E87.6  ICD-9-CM: 276.8  5/21/2022 - Present        Dehydration ICD-10-CM: E86.0  ICD-9-CM: 276.51  5/21/2022 - Present        Colitis ICD-10-CM: K52.9  ICD-9-CM: 558.9  5/21/2022 - Present        Nephrolithiasis ICD-10-CM: N20.0  ICD-9-CM: 592.0  5/26/2021 - Present        Hydronephrosis ICD-10-CM: N13.30  ICD-9-CM: 991  5/26/2021 - Present        Flank pain ICD-10-CM: R10.9  ICD-9-CM: 789.09  5/26/2021 - Present        Renal insufficiency ICD-10-CM: N28.9  ICD-9-CM: 593.9  5/26/2021 - Present        RESOLVED: Hematuria ICD-10-CM: R31.9  ICD-9-CM: 599.70  5/26/2021 - 5/21/2022        RESOLVED: BRBPR (bright red blood per rectum) ICD-10-CM: K62.5  ICD-9-CM: 569.3  5/26/2021 - 5/21/2022        RESOLVED: TIA (transient ischemic attack) ICD-10-CM: G45.9  ICD-9-CM: 435.9  10/14/2017 - 5/26/2021              Greater than 30 minutes were spent with the patient on counseling and coordination of care    Signed:   Feliciano Alonzo MD  6/3/2022  11:26 AM

## 2022-06-03 NOTE — DISCHARGE INSTRUCTIONS
Discharge Instructions       PATIENT ID: Alex Clemons  MRN: [id number]   YOB: 1955    DATE OF ADMISSION: 5/21/2022 11:57 AM    DATE OF DISCHARGE: 6/3/2022    PRIMARY CARE PROVIDER: None       DISCHARGING PHYSICIAN: Alannah Mai MD    To contact this individual call [phone] and ask the  to page. If unavailable ask to be transferred the Adult Hospitalist Department. DISCHARGE DIAGNOSES C difficile Colitis, Left hydronephrosis 2/2 to kidney stone    CONSULTATIONS: IP CONSULT TO GASTROENTEROLOGY  IP CONSULT TO UROLOGY    PROCEDURES/SURGERIES: Procedure(s):  CYSTOSCOPY WITH left ureteral RETROGRADES LEFT STENT INSERTION    PENDING TEST RESULTS:   At the time of discharge the following test results are still pending:     FOLLOW UP APPOINTMENTS:   Follow-up Information     Follow up With Specialties Details Why [address] [facility] Urology  On 6/10/2022 Dr. Flora Mejia at [facility] [address]    None    None (395) Patient stated that they have no PCP             ADDITIONAL CARE RECOMMENDATIONS:     DIET: Regular Diet    ACTIVITY: Activity as tolerated    WOUND CARE:     EQUIPMENT needed:       DISCHARGE MEDICATIONS:   See Medication Reconciliation Form    · It is important that you take the medication exactly as they are prescribed. · Keep your medication in the bottles provided by the pharmacist and keep a list of the medication names, dosages, and times to be taken in your wallet. · Do not take other medications without consulting your doctor. NOTIFY YOUR PHYSICIAN FOR ANY OF THE FOLLOWING:   Fever over 101 degrees for 24 hours. Chest pain, shortness of breath, fever, chills, nausea, vomiting, diarrhea, change in mentation, falling, weakness, bleeding. Severe pain or pain not relieved by medications. Or, any other signs or symptoms that you may have questions about.       DISPOSITION:  x  Home With:   OT  PT  [facility]  RN SNF/Inpatient Rehab/LTAC    Independent/assisted living    Hospice    Other:     CDMP Checked:   Yes x       Signed:   Sujit Solis MD  6/3/2022  11:34 AM

## 2022-06-17 NOTE — PROGRESS NOTES
Physician Progress Note      PATIENT:               Otf Oneal  CSN #:                  [id number]  :                       1955  ADMIT DATE:       2022 11:57 AM  DISCH DATE:        6/3/2022 6:24 PM  RESPONDING  PROVIDER #:        Bala Jha MD          QUERY TEXT:    Dear Attending,    Patient admitted with Left hydronephrosis from an obstructing stone, noted documentation of UTI infection ruled out on  PN and complicated UTI on DCS. If possible, please document in progress notes and discharge summary if you are evaluating and /or treating any of the following: The medical record reflects the following:  Risk Factors: Left hydronephrosis from obstructing stone  Clinical Indicators: UA on admission with 1+ bacteria and cx with no significant growth  -  PN with documentation of complicated UTI  -  PN- Complicated UTI due to obstructing stone POA: initially suspected but given urine Cx neg. Infection ruled out.  - DCS- Complicated UTI due to obstructing stone POA: -repeat urine cultures without significant growth  Treatment: UA with cx, systemic IV antibiotic noted to be on hold d/t C diff, monitoring      Thank you,    Vandana John, RN  [facility]  [phone]  Options provided:  -- Complicated UTI ruled out  -- Complicated UTI confirmed  -- Other - I will add my own diagnosis  -- Disagree - Not applicable / Not valid  -- Disagree - Clinically unable to determine / Unknown  -- Refer to Clinical Documentation Reviewer    PROVIDER RESPONSE TEXT:    After study, complicated UTI was ruled out.     Query created by: Jeff Gunderson on 2022 2:49 PM      Electronically signed by:  Bala Jha MD 2022 7:38 AM

## 2022-06-20 PROBLEM — E86.0 DEHYDRATION: Status: RESOLVED | Noted: 2022-05-21 | Resolved: 2022-06-20

## 2022-07-13 NOTE — PROGRESS NOTES
Physician Progress Note      PATIENT:               Frank Hayes  CSN #:                  [id number]  :                       1955  ADMIT DATE:       2022 11:57 AM  [address] [place] DATE:        6/3/2022 6:24 PM  RESPONDING  PROVIDER #:        Sarahi Singh MD          QUERY TEXT:    Dear Attending,    Patient admitted with C diff diarrhea. Noted initial documentation of sepsis in H&P with documentation on DCS \"presented with sepsis\" and also documentation on DCS \"in ER we find bandemia but no other SIRS criteria. \"  In order to support the diagnosis of sepsis, please include additional clinical indicators in your documentation, or please document if the diagnosis of sepsis has been ruled out after further study. The medical record reflects the following:  Risk Factors: C diff diarrhea  Clinical Indicators: admitted with C diff diarrhea  - WBC WNL throughout admission  - Temp 100.4 x1 on  and 100.5 x1 on   - C-Reactive protein: 7.67 on admission  - sepsis noted on H&P  - DCS notes pt presented with sepsis and also documentation of pt in ER with bandemia but no other SIRS criteria  Treatment: flagyl 500 mg IV,  fidaxomicin 200 mg 2 times daily, vanc 500 mg every 6 hours, monitoring    Thank you,    Cathy Jasmine RN  CDMP  [phone]  Options provided:  -- Sepsis present as evidenced by, Please document evidence. -- Sepsis was ruled out after study  -- Other - I will add my own diagnosis  -- Disagree - Not applicable / Not valid  -- Disagree - Clinically unable to determine / Unknown  -- Refer to Clinical Documentation Reviewer    PROVIDER RESPONSE TEXT:    Sepsis was ruled out after study.     Query created by: Kamilah Pimentel on 2022 3:31 PM      Electronically signed by:  Sarahi Singh MD 2022 3:37 PM

## (undated) DEVICE — OPEN-END URETERAL CATHETER: Brand: COOK

## (undated) DEVICE — GLOVE ORANGE PI 8   MSG9080

## (undated) DEVICE — INFECTION CONTROL KIT SYS

## (undated) DEVICE — GUIDEWIRE ENDOSCP L150CM DIA0.035IN TIP L15CM BENT PTFE

## (undated) DEVICE — CYSTO/BLADDER IRRIGATION SET, REGULATING CLAMP

## (undated) DEVICE — STRAP,POSITIONING,KNEE/BODY,FOAM,4X60": Brand: MEDLINE

## (undated) DEVICE — SOLUTION IRRIGATION H2O 0797305] ICU MEDICAL INC]

## (undated) DEVICE — PACK,CYSTOSCOPY,PK III,SIRUS: Brand: MEDLINE

## (undated) DEVICE — 4-PORT MANIFOLD: Brand: NEPTUNE 2

## (undated) DEVICE — TUBING, SUCTION, 1/4" X 12', STRAIGHT: Brand: MEDLINE

## (undated) DEVICE — JELLY,LUBE,STERILE,FLIP TOP,TUBE,4-OZ: Brand: MEDLINE

## (undated) DEVICE — COVER LT HNDL PLAS RIG 1 PER PK

## (undated) DEVICE — GOWN,SIRUS,FABRNF,XL,20/CS: Brand: MEDLINE

## (undated) DEVICE — SOLUTION IRRIG 1000ML STRL H2O USP PLAS POUR BTL

## (undated) DEVICE — BAG COLLECTION FLD OR-TBL NS --

## (undated) DEVICE — CYSTO-SFMC: Brand: MEDLINE INDUSTRIES, INC.

## (undated) DEVICE — SYR 10ML LUER LOK 1/5ML GRAD --

## (undated) DEVICE — SOL IRR STRL H2O 1000ML BTL --

## (undated) DEVICE — STERILE POLYISOPRENE POWDER-FREE SURGICAL GLOVES: Brand: PROTEXIS

## (undated) DEVICE — KIT SURG PREP POVIDONE IOD PRESATURATED PAINT WET FOR UNIV

## (undated) DEVICE — SPONGE GZ W4XL4IN COT 12 PLY TYP VII WVN C FLD DSGN

## (undated) DEVICE — MARKER SKIN GENTIAN VLT FN TIP W/ 6IN RUL L RESVR MED GRD

## (undated) DEVICE — TOWEL SURG W17XL27IN STD BLU COT NONFENESTRATED PREWASHED

## (undated) DEVICE — CONTAINER,SPECIMEN,4OZ,OR STRL: Brand: MEDLINE